# Patient Record
Sex: MALE | Race: WHITE | NOT HISPANIC OR LATINO | Employment: OTHER | URBAN - NONMETROPOLITAN AREA
[De-identification: names, ages, dates, MRNs, and addresses within clinical notes are randomized per-mention and may not be internally consistent; named-entity substitution may affect disease eponyms.]

---

## 2017-05-24 ENCOUNTER — HOSPITAL ENCOUNTER (EMERGENCY)
Facility: HOSPITAL | Age: 42
Discharge: HOME | End: 2017-05-24
Attending: EMERGENCY MEDICINE | Admitting: EMERGENCY MEDICINE

## 2017-05-24 ENCOUNTER — APPOINTMENT (OUTPATIENT)
Dept: CT IMAGING | Facility: HOSPITAL | Age: 42
End: 2017-05-24

## 2017-05-24 VITALS
RESPIRATION RATE: 14 BRPM | DIASTOLIC BLOOD PRESSURE: 75 MMHG | OXYGEN SATURATION: 97 % | BODY MASS INDEX: 37.86 KG/M2 | HEART RATE: 90 BPM | WEIGHT: 295 LBS | TEMPERATURE: 98.1 F | SYSTOLIC BLOOD PRESSURE: 136 MMHG | HEIGHT: 74 IN

## 2017-05-24 DIAGNOSIS — R10.9 FLANK PAIN: Primary | ICD-10-CM

## 2017-05-24 LAB
ALBUMIN SERPL-MCNC: 4.1 G/DL (ref 3.5–5)
ALBUMIN/GLOB SERPL: 1.3 G/DL (ref 1.1–2.5)
ALP SERPL-CCNC: 78 U/L (ref 24–120)
ALT SERPL W P-5'-P-CCNC: 37 U/L (ref 0–54)
AMYLASE SERPL-CCNC: 47 U/L (ref 30–110)
ANION GAP SERPL CALCULATED.3IONS-SCNC: 14 MMOL/L (ref 4–13)
APTT PPP: 26.1 SECONDS (ref 24.1–34.8)
AST SERPL-CCNC: 28 U/L (ref 7–45)
BASOPHILS # BLD AUTO: 0.03 10*3/MM3 (ref 0–0.2)
BASOPHILS NFR BLD AUTO: 0.3 % (ref 0–2)
BILIRUB SERPL-MCNC: 1 MG/DL (ref 0.1–1)
BILIRUB UR QL STRIP: NEGATIVE
BUN BLD-MCNC: 13 MG/DL (ref 5–21)
BUN/CREAT SERPL: 12.5 (ref 7–25)
CALCIUM SPEC-SCNC: 9.7 MG/DL (ref 8.4–10.4)
CHLORIDE SERPL-SCNC: 105 MMOL/L (ref 98–110)
CLARITY UR: CLEAR
CO2 SERPL-SCNC: 23 MMOL/L (ref 24–31)
COLOR UR: YELLOW
CREAT BLD-MCNC: 1.04 MG/DL (ref 0.5–1.4)
DEPRECATED RDW RBC AUTO: 41.3 FL (ref 40–54)
EOSINOPHIL # BLD AUTO: 0.05 10*3/MM3 (ref 0–0.7)
EOSINOPHIL NFR BLD AUTO: 0.6 % (ref 0–4)
ERYTHROCYTE [DISTWIDTH] IN BLOOD BY AUTOMATED COUNT: 13 % (ref 12–15)
GFR SERPL CREATININE-BSD FRML MDRD: 78 ML/MIN/1.73
GLOBULIN UR ELPH-MCNC: 3.2 GM/DL
GLUCOSE BLD-MCNC: 105 MG/DL (ref 70–100)
GLUCOSE UR STRIP-MCNC: NEGATIVE MG/DL
HCT VFR BLD AUTO: 44.3 % (ref 40–52)
HGB BLD-MCNC: 14.9 G/DL (ref 14–18)
HGB UR QL STRIP.AUTO: NEGATIVE
IMM GRANULOCYTES # BLD: 0.02 10*3/MM3 (ref 0–0.03)
IMM GRANULOCYTES NFR BLD: 0.2 % (ref 0–5)
INR PPP: 0.93 (ref 0.91–1.09)
KETONES UR QL STRIP: NEGATIVE
LEUKOCYTE ESTERASE UR QL STRIP.AUTO: NEGATIVE
LIPASE SERPL-CCNC: 23 U/L (ref 23–203)
LYMPHOCYTES # BLD AUTO: 1.68 10*3/MM3 (ref 0.72–4.86)
LYMPHOCYTES NFR BLD AUTO: 19.5 % (ref 15–45)
MCH RBC QN AUTO: 29.2 PG (ref 28–32)
MCHC RBC AUTO-ENTMCNC: 33.6 G/DL (ref 33–36)
MCV RBC AUTO: 86.7 FL (ref 82–95)
MONOCYTES # BLD AUTO: 0.51 10*3/MM3 (ref 0.19–1.3)
MONOCYTES NFR BLD AUTO: 5.9 % (ref 4–12)
NEUTROPHILS # BLD AUTO: 6.32 10*3/MM3 (ref 1.87–8.4)
NEUTROPHILS NFR BLD AUTO: 73.5 % (ref 39–78)
NITRITE UR QL STRIP: NEGATIVE
NT-PROBNP SERPL-MCNC: 34.2 PG/ML (ref 0–450)
PH UR STRIP.AUTO: 8.5 [PH] (ref 5–8)
PLATELET # BLD AUTO: 230 10*3/MM3 (ref 130–400)
PMV BLD AUTO: 10 FL (ref 6–12)
POTASSIUM BLD-SCNC: 4 MMOL/L (ref 3.5–5.3)
PROT SERPL-MCNC: 7.3 G/DL (ref 6.3–8.7)
PROT UR QL STRIP: ABNORMAL
PROTHROMBIN TIME: 12.7 SECONDS (ref 11.9–14.6)
RBC # BLD AUTO: 5.11 10*6/MM3 (ref 4.8–5.9)
SODIUM BLD-SCNC: 142 MMOL/L (ref 135–145)
SP GR UR STRIP: 1.02 (ref 1–1.03)
UROBILINOGEN UR QL STRIP: ABNORMAL
WBC NRBC COR # BLD: 8.61 10*3/MM3 (ref 4.8–10.8)

## 2017-05-24 PROCEDURE — 25010000002 HYDROMORPHONE PER 4 MG: Performed by: EMERGENCY MEDICINE

## 2017-05-24 PROCEDURE — 96361 HYDRATE IV INFUSION ADD-ON: CPT

## 2017-05-24 PROCEDURE — 96374 THER/PROPH/DIAG INJ IV PUSH: CPT

## 2017-05-24 PROCEDURE — 81003 URINALYSIS AUTO W/O SCOPE: CPT | Performed by: EMERGENCY MEDICINE

## 2017-05-24 PROCEDURE — 82150 ASSAY OF AMYLASE: CPT | Performed by: EMERGENCY MEDICINE

## 2017-05-24 PROCEDURE — 25010000002 ONDANSETRON PER 1 MG: Performed by: EMERGENCY MEDICINE

## 2017-05-24 PROCEDURE — 96375 TX/PRO/DX INJ NEW DRUG ADDON: CPT

## 2017-05-24 PROCEDURE — 83880 ASSAY OF NATRIURETIC PEPTIDE: CPT | Performed by: EMERGENCY MEDICINE

## 2017-05-24 PROCEDURE — 74176 CT ABD & PELVIS W/O CONTRAST: CPT

## 2017-05-24 PROCEDURE — 85730 THROMBOPLASTIN TIME PARTIAL: CPT | Performed by: EMERGENCY MEDICINE

## 2017-05-24 PROCEDURE — 85025 COMPLETE CBC W/AUTO DIFF WBC: CPT | Performed by: EMERGENCY MEDICINE

## 2017-05-24 PROCEDURE — 80053 COMPREHEN METABOLIC PANEL: CPT | Performed by: EMERGENCY MEDICINE

## 2017-05-24 PROCEDURE — 99284 EMERGENCY DEPT VISIT MOD MDM: CPT

## 2017-05-24 PROCEDURE — 85610 PROTHROMBIN TIME: CPT | Performed by: EMERGENCY MEDICINE

## 2017-05-24 PROCEDURE — 83690 ASSAY OF LIPASE: CPT | Performed by: EMERGENCY MEDICINE

## 2017-05-24 RX ORDER — HYDROCODONE BITARTRATE AND ACETAMINOPHEN 10; 325 MG/1; MG/1
1 TABLET ORAL EVERY 6 HOURS PRN
Qty: 15 TABLET | Refills: 0 | Status: SHIPPED | OUTPATIENT
Start: 2017-05-24 | End: 2017-12-01 | Stop reason: HOSPADM

## 2017-05-24 RX ORDER — DIAZEPAM 5 MG/1
5 TABLET ORAL EVERY 6 HOURS PRN
Qty: 15 TABLET | Refills: 0 | Status: SHIPPED | OUTPATIENT
Start: 2017-05-24 | End: 2017-10-26 | Stop reason: SDUPTHER

## 2017-05-24 RX ORDER — SODIUM CHLORIDE 9 MG/ML
125 INJECTION, SOLUTION INTRAVENOUS CONTINUOUS
Status: DISCONTINUED | OUTPATIENT
Start: 2017-05-24 | End: 2017-05-24 | Stop reason: HOSPADM

## 2017-05-24 RX ORDER — ONDANSETRON 2 MG/ML
4 INJECTION INTRAMUSCULAR; INTRAVENOUS ONCE
Status: COMPLETED | OUTPATIENT
Start: 2017-05-24 | End: 2017-05-24

## 2017-05-24 RX ADMIN — HYDROMORPHONE HYDROCHLORIDE 1 MG: 1 INJECTION, SOLUTION INTRAMUSCULAR; INTRAVENOUS; SUBCUTANEOUS at 11:00

## 2017-05-24 RX ADMIN — ONDANSETRON 4 MG: 2 INJECTION INTRAMUSCULAR; INTRAVENOUS at 11:01

## 2017-05-24 RX ADMIN — SODIUM CHLORIDE 125 ML/HR: 9 INJECTION, SOLUTION INTRAVENOUS at 11:10

## 2017-05-30 ENCOUNTER — TRANSCRIBE ORDERS (OUTPATIENT)
Dept: ADMINISTRATIVE | Facility: HOSPITAL | Age: 42
End: 2017-05-30

## 2017-05-30 DIAGNOSIS — K77 LIVER DISORDERS IN DISEASES CLASSIFIED ELSEWHERE: Primary | ICD-10-CM

## 2017-06-02 ENCOUNTER — HOSPITAL ENCOUNTER (OUTPATIENT)
Dept: ULTRASOUND IMAGING | Facility: HOSPITAL | Age: 42
Discharge: HOME | End: 2017-06-02
Admitting: NURSE PRACTITIONER

## 2017-06-02 DIAGNOSIS — K77 LIVER DISORDERS IN DISEASES CLASSIFIED ELSEWHERE: ICD-10-CM

## 2017-06-02 PROCEDURE — 76705 ECHO EXAM OF ABDOMEN: CPT

## 2017-07-18 ENCOUNTER — OFFICE VISIT (OUTPATIENT)
Dept: NEUROSURGERY | Facility: CLINIC | Age: 42
End: 2017-07-18

## 2017-07-18 VITALS
DIASTOLIC BLOOD PRESSURE: 90 MMHG | SYSTOLIC BLOOD PRESSURE: 120 MMHG | BODY MASS INDEX: 39.78 KG/M2 | HEIGHT: 74 IN | WEIGHT: 310 LBS

## 2017-07-18 DIAGNOSIS — Z78.9 NON-SMOKER: ICD-10-CM

## 2017-07-18 DIAGNOSIS — M51.26 DISPLACEMENT OF LUMBAR INTERVERTEBRAL DISC WITHOUT MYELOPATHY: Primary | ICD-10-CM

## 2017-07-18 PROCEDURE — 99204 OFFICE O/P NEW MOD 45 MIN: CPT | Performed by: NURSE PRACTITIONER

## 2017-07-18 RX ORDER — TIZANIDINE 4 MG/1
4 TABLET ORAL EVERY 8 HOURS PRN
Qty: 90 TABLET | Refills: 2 | Status: SHIPPED | OUTPATIENT
Start: 2017-07-18 | End: 2017-10-26 | Stop reason: SDUPTHER

## 2017-07-18 RX ORDER — DICLOFENAC SODIUM 75 MG/1
75 TABLET, DELAYED RELEASE ORAL 2 TIMES DAILY
Qty: 60 TABLET | Refills: 2 | Status: SHIPPED | OUTPATIENT
Start: 2017-07-18 | End: 2017-10-05 | Stop reason: HOSPADM

## 2017-07-18 RX ORDER — METHYLPREDNISOLONE 4 MG/1
TABLET ORAL
Qty: 21 EACH | Refills: 0 | Status: SHIPPED | OUTPATIENT
Start: 2017-07-18 | End: 2017-10-26

## 2017-07-18 NOTE — PROGRESS NOTES
Chief complaint:   Chief Complaint   Patient presents with   • Back Pain     Patient is referred here today for his back pain, also has pain that goes down into his left leg. He states he has not had any physical therapy.        Subjective     HPI: This is a 42-year-old male gentleman who is referred to us for back and left lower extremity pain.  He is here to be evaluated today.  He states that the pain began about 6 months ago but it really became severe about 2 months ago.  He says that the pain in his back is constant.  He says its better when is lying down.  Its worse when he is bending he says that he noticed the pain whenever he was wheeling his mother up a ramp.  Pain in his back is about 80% of his problem in his lower extremity about 20%.  He says that he is having pain going down his leg in a posterior radicular fashion all the way to his foot.  This pain is intermittent.  He says its better when is lying down.  Its worse when he sitting.  Is not complaining of any bowel or bladder issues.  He is not any recent physical therapy, chiropractic care, or pain management injections.  He rates the pain on a scale 0-10 at a 9.  He is right-hand dominant.  He works as a paramedic and .    Review of Systems   Constitutional: Positive for unexpected weight change.   HENT: Positive for hearing loss.    Eyes:        Blurred/double vision   Gastrointestinal:        Change in bowel movements   Endocrine: Positive for cold intolerance and heat intolerance.        Glandular / hormone problem   Musculoskeletal: Positive for back pain and joint swelling.   All other systems reviewed and are negative.       Past Medical History:   Diagnosis Date   • Arthritis    • Depression    • Disease of thyroid gland    • Hypertension    • Injury of back      Past Surgical History:   Procedure Laterality Date   • TONSILLECTOMY     • VASECTOMY       Family History   Problem Relation Age of Onset   • Arthritis Mother    •  "Hypertension Mother    • Aneurysm Father    • No Known Problems Sister    • No Known Problems Brother      Social History   Substance Use Topics   • Smoking status: Never Smoker   • Smokeless tobacco: Current User     Types: Snuff   • Alcohol use Yes      Comment: occassional       (Not in a hospital admission)  Allergies:  Eggs or egg-derived products    Objective      Vital Signs  /90 (BP Location: Right arm, Patient Position: Sitting)  Ht 74\" (188 cm)  Wt (!) 310 lb (141 kg)  BMI 39.8 kg/m2    Physical Exam   Constitutional: He is oriented to person, place, and time. He appears well-developed and well-nourished.   HENT:   Head: Normocephalic.   Eyes: Conjunctivae, EOM and lids are normal. Pupils are equal, round, and reactive to light.   Neck: Normal range of motion.   Cardiovascular: Normal rate, regular rhythm and normal heart sounds.    Pulmonary/Chest: Effort normal and breath sounds normal.   Abdominal: Normal appearance.   Musculoskeletal: Normal range of motion.        Lumbar back: He exhibits pain.   Neurological: He is alert and oriented to person, place, and time. He has normal strength and normal reflexes. He displays normal reflexes. No cranial nerve deficit or sensory deficit. GCS eye subscore is 4. GCS verbal subscore is 5. GCS motor subscore is 6.   Skin: Skin is warm.   Psychiatric: He has a normal mood and affect. His speech is normal and behavior is normal. Thought content normal. Cognition and memory are normal.       Results Review: The patient had a CT scan of his abdomen and pelvis which did reveal that he did have a disc herniation at L4-5.  It does appear to be off to the left.  No malalignment was visualized.  No fracture visualized.          Assessment/Plan: At this point we are in set the patient into a dedicated course of physical therapy consisting of 2-3 times a week for 4-6 weeks.  I will follow-up again with the patient in 6 weeks.  I am is start him on anti-inflammatory " medication, Medrol Dosepak, and Zanaflex.  Should the patient not have any improvement from the therapy we will order an MRI of his lumbar spine.  BMI shows that is very overweight.  BMI chart was given the patient.  He is a nonsmoker.      Alin was seen today for back pain.    Diagnoses and all orders for this visit:    Displacement of lumbar intervertebral disc without myelopathy    BMI 39.0-39.9,adult    Non-smoker        I discussed the patients findings and my recommendations with patient    Huber Hyde, WIL  07/18/17  3:28 PM

## 2017-07-19 ENCOUNTER — TRANSCRIBE ORDERS (OUTPATIENT)
Dept: PHYSICAL THERAPY | Facility: HOSPITAL | Age: 42
End: 2017-07-19

## 2017-07-19 DIAGNOSIS — M51.26 DISPLACEMENT OF LUMBAR INTERVERTEBRAL DISC WITHOUT MYELOPATHY: Primary | ICD-10-CM

## 2017-07-25 ENCOUNTER — HOSPITAL ENCOUNTER (OUTPATIENT)
Dept: PHYSICAL THERAPY | Facility: HOSPITAL | Age: 42
Setting detail: THERAPIES SERIES
Discharge: HOME | End: 2017-07-25

## 2017-07-25 DIAGNOSIS — M51.26 DISPLACEMENT OF LUMBAR INTERVERTEBRAL DISC WITHOUT MYELOPATHY: Primary | ICD-10-CM

## 2017-07-25 PROCEDURE — 97161 PT EVAL LOW COMPLEX 20 MIN: CPT | Performed by: PHYSICAL THERAPIST

## 2017-07-25 PROCEDURE — 97110 THERAPEUTIC EXERCISES: CPT | Performed by: PHYSICAL THERAPIST

## 2017-07-25 PROCEDURE — 97012 MECHANICAL TRACTION THERAPY: CPT | Performed by: PHYSICAL THERAPIST

## 2017-07-25 NOTE — PROGRESS NOTES
Outpatient Physical Therapy Ortho Initial Evaluation  [facility]     Patient Name: Alin Richard  : 1975  MRN: [id number]  Today's Date: 2017    Attendance  available. MD in 2 weeks.    Visit Date: 2017    Patient Active Problem List   Diagnosis   • Displacement of lumbar intervertebral disc without myelopathy   • BMI 39.0-39.9,adult   • Non-smoker        Past Medical History:   Diagnosis Date   • Arthritis    • Depression    • Disease of thyroid gland    • Hypertension    • Injury of back         Past Surgical History:   Procedure Laterality Date   • TONSILLECTOMY     • VASECTOMY       Outpatient Medications           diclofenac (VOLTAREN) 75 MG EC tablet      Escitalopram Oxalate (LEXAPRO PO)      Hydrocodone-Acetaminophen (NORCO PO)      HYDROcodone-acetaminophen (NORCO)  MG per tablet      Levothyroxine Sodium (SYNTHROID PO)      LISINOPRIL PO            TESTOSTERONE CYPIONATE IM      tiZANidine (ZANAFLEX) 4 MG tablet      Topiramate (TOPAMAX PO)      Allergies: eggs/ egg products    Visit Dx:     ICD-10-CM ICD-9-CM   1. Displacement of lumbar intervertebral disc without myelopathy M51.26 722.10             Patient History       17 1500          History    Brief Description of Current Complaint Norco long term, Pain comes and goes in intensity. [facility]. ED because of back spasms he thought was a kidney stone.  -DD      What clinical tests have you had for this problem? CT scan;X-ray  -DD      Results of Clinical Tests disc bulge  -DD      Pain     Pain Comments travels down left hip wraps around to the front at the knee to the top and lateral foot  -DD        User Key  (r) = Recorded By, (t) = Taken By, (c) = Cosigned By    Initials Name Provider Type    DD Thea Olivo, PT Physical Therapist                PT Ortho       17 1500    Subjective Comments    Subjective Comments Chronic issue at least a year.  -DD    Subjective Pain     "Pre-Treatment Pain Level 3  -DD    Post-Treatment Pain Level 3  -DD    Posture/Observations    Posture/Observations Comments slightly rounded shoulders, Obese, poor abdominal tone  -DD    Special Tests/Palpation    Special Tests/Palpation --   Neg SLR, Neg Fabers  -DD    ROM (Range of Motion)    General ROM Detail Flexion 2\" from floor, ext 20 deg, Lateral flexion WFL with pain, Hamstrings 55 deg.  -DD    MMT (Manual Muscle Testing)    General MMT Assessment Detail 5/5  -DD      User Key  (r) = Recorded By, (t) = Taken By, (c) = Cosigned By    Initials Name Provider Type    JOSE Olivo, PT Physical Therapist                            Therapy Education       07/25/17 1603          Therapy Education    Given HEP  -DD      Program New  -DD      How Provided Verbal;Demonstration;Written  -DD      Provided to Patient  -DD      Level of Understanding Teach back education performed;Verbalized;Demonstrated  -DD        User Key  (r) = Recorded By, (t) = Taken By, (c) = Cosigned By    Initials Name Provider Type    JOSE Olivo, ABIDA Physical Therapist                PT OP Goals       07/25/17 1500       PT Short Term Goals    STG Date to Achieve 08/15/17  -DD     STG 1 Patient will be independent in home exercise program  -DD     STG 2 Patient will have modified Oswestry score of 30% or less  -DD     STG 3 Lumbar flexion range of motion to the floor with no discomfort  -DD     STG 4 Lumbar extension range of motion 25 deg without pain  -DD     STG 5 Patient will report pain as 2/10  -DD     Long Term Goals    LTG Date to Achieve 08/29/17  -DD     LTG 1 Patient will have modified Oswestry score of 26% or better  -DD     LTG 2 Patient to report decreased radicular symptoms  -DD     LTG 3 Subjective improvement 50% or more.  -DD     LTG 4 Patient to nakul 45 min ther ex without increase symptoms  -DD     LTG 5 Hamstring flexibiity 70 at 90/90 position.  -DD     Time Calculation    PT Goal Re-Cert Due Date " 08/15/17  -DD       User Key  (r) = Recorded By, (t) = Taken By, (c) = Cosigned By    Initials Name Provider Type    JOSE Olivo, PT Physical Therapist                PT Assessment/Plan       07/25/17 1629       PT Assessment    Functional Limitations Impaired gait;Performance in leisure activities;Performance in self-care ADL;Performance in work activities;Limitation in home management;Limitations in community activities  -DD     Impairments Gait;Endurance;Pain;Poor body mechanics;Range of motion;Impaired flexibility  -DD     Assessment Comments Patient has lumbar mechanical and degenerative changes impairing flexibility and referred left leg pain. Would benefit from core stabiliztion and flexibiity exercises, Manual as needed. Traction, Ice. Electrical stimulation modalities as needed.  -DD     Please refer to paper survey for additional self-reported information Yes  -DD     Rehab Potential Good  -DD     Patient/caregiver participated in establishment of treatment plan and goals Yes  -DD     PT Plan    PT Frequency 2x/week  -DD     Predicted Duration of Therapy Intervention (days/wks) 4 weeks  -DD     Planned CPT's? PT EVAL LOW COMPLEXITY: 22761;PT RE-EVAL: 58793;PT GAIT TRAINING EA 15 MIN: 13624;PT MANUAL THERAPY EA 15 MIN: 34620;PT NEUROMUSC RE-EDUCATION EA 15 MIN: 25701;PT THER PROC EA 15 MIN: 34554;PT ELECTRICAL STIM UNATTEND: ;PT HOT/COLD PACK WC NONMCARE: 71040  -DD     Physical Therapy Interventions (Optional Details) dry needling;gait training;home exercise program;joint mobilization;postural re-education;stretching;strengthening;ROM (Range of Motion);stair training;manual therapy techniques;modalities  -DD     PT Plan Comments Lumbar traction, core stabilization and flexibility. Manual therapy as needed may include dry needling. Ice and e-stim PRN.  -DD       User Key  (r) = Recorded By, (t) = Taken By, (c) = Cosigned By    Initials Name Provider Type    JOSE Olivo, PT  Physical Therapist                Modalities       07/25/17 1500          Traction 08687    Traction Type Lumbar  -DD      Rx Minutes 15  -DD      Duration Intermittent  -DD      Position Hook-lying  -DD      Weight 90  -DD      Hold 60  -DD      Relax 10  -DD      Progression 1  -DD      Regression 1  -DD        User Key  (r) = Recorded By, (t) = Taken By, (c) = Cosigned By    Initials Name Provider Type    DD Thea Olivo, ABIDA Physical Therapist              Exercises       07/25/17 1500          Subjective Comments    Subjective Comments Chronic issue at least a year.  -DD      Subjective Pain    Pre-Treatment Pain Level 3  -DD      Post-Treatment Pain Level 3  -DD      Exercise 1    Exercise Name 1 WESTON  -DD      Reps 1 10  -DD      Time (Seconds) 1 10  -DD      Exercise 2    Exercise Name 2 Prone TKEs  -DD      Reps 2 10  -DD      Time (Seconds) 2 5  -DD      Exercise 3    Exercise Name 3 Hamstring stretch  -DD      Reps 3 3  -DD      Time (Seconds) 3 30  -DD      Exercise 4    Exercise Name 4 Pirfiromis stretch  -DD      Reps 4 3  -DD      Time (Seconds) 4 30  -DD      Exercise 5    Exercise Name 5 TA with add  -DD      Reps 5 10  -DD      Exercise 6    Exercise Name 6 Seated neural tensi glides.  -DD      Reps 6 2  -DD      Time (Seconds) 6 30  -DD        User Key  (r) = Recorded By, (t) = Taken By, (c) = Cosigned By    Initials Name Provider Type    JOSE Olivo, ABIDA Physical Therapist                              Outcome Measures       07/25/17 1600          Modified Oswestry    Modified Oswestry Score/Comments 36%  -DD      Functional Assessment    Outcome Measure Options Modifed Owestry  -DD        User Key  (r) = Recorded By, (t) = Taken By, (c) = Cosigned By    Initials Name Provider Type    JOSE Olivo PT Physical Therapist            Time Calculation:   Start Time: 1528  Stop Time: 1640  Time Calculation (min): 72 min  Total Timed Code Minutes- PT: 25 minute(s)      Therapy Charges for Today     Code Description Service Date Service Provider Modifiers Qty    86389848951 HC PT-TRACTION MECHANICAL 7/25/2017 Thea Olivo, PT  1    26677410166 HC PT EVAL LOW COMPLEXITY 2 7/25/2017 Thea Olivo, PT GP 1    35221759645 HC PT THER PROC EA 15 MIN 7/25/2017 Thea Olivo, PT GP 2          PT G-Codes  Outcome Measure Options: Modifed Owestry 36%        Thea Olivo, PT, ATC, DPT  7/25/2017

## 2017-08-03 ENCOUNTER — HOSPITAL ENCOUNTER (OUTPATIENT)
Dept: PHYSICAL THERAPY | Facility: HOSPITAL | Age: 42
Setting detail: THERAPIES SERIES
Discharge: HOME | End: 2017-08-03

## 2017-08-03 DIAGNOSIS — M51.26 DISPLACEMENT OF LUMBAR INTERVERTEBRAL DISC WITHOUT MYELOPATHY: Primary | ICD-10-CM

## 2017-08-03 PROCEDURE — 97110 THERAPEUTIC EXERCISES: CPT

## 2017-08-03 PROCEDURE — 97140 MANUAL THERAPY 1/> REGIONS: CPT

## 2017-08-03 NOTE — THERAPY TREATMENT NOTE
"    Outpatient Physical Therapy Ortho Treatment Note  [facility]  Kathy Tian PTA  17  4:54 PM       Patient Name: Alin Richard  : 1975  MRN: [id number]  Today's Date: 8/3/2017      Visit Date: 2017  Subjective Improvement: 0%      Attendance:    Approved:18 visits           MD follow up: 17            date: 8/15/17          Visit Dx:    ICD-10-CM ICD-9-CM   1. Displacement of lumbar intervertebral disc without myelopathy M51.26 722.10       Patient Active Problem List   Diagnosis   • Displacement of lumbar intervertebral disc without myelopathy   • BMI 39.0-39.9,adult   • Non-smoker        Past Medical History:   Diagnosis Date   • Arthritis    • Depression    • Disease of thyroid gland    • Hypertension    • Injury of back         Past Surgical History:   Procedure Laterality Date   • TONSILLECTOMY     • VASECTOMY               PT Ortho       17 1500    Subjective Pain    Post-Treatment Pain Level 2  -      User Key  (r) = Recorded By, (t) = Taken By, (c) = Cosigned By    Initials Name Provider Type     Kathy Tian PTA Physical Therapy Assistant                            PT Assessment/Plan       17 1500       PT Assessment    Functional Limitations Impaired gait;Performance in leisure activities;Performance in self-care ADL;Performance in work activities;Limitation in home management;Limitations in community activities  -     Impairments Gait;Endurance;Pain;Poor body mechanics;Range of motion;Impaired flexibility  -     Assessment Comments pt did very well with all therex given. pt did report \"tightness\" with WESTON. Decerease pain post Lumbar mechanical traction  -     Rehab Potential Good  -     Patient/caregiver participated in establishment of treatment plan and goals Yes  -     Patient would benefit from skilled therapy intervention Yes  -BRYON     PT Plan    PT Frequency 2x/week  -     Predicted Duration of Therapy " "Intervention (days/wks) 4 weeks  -BRYON     PT Plan Comments Bridges  -BRYON       User Key  (r) = Recorded By, (t) = Taken By, (c) = Cosigned By    Initials Name Provider Type    BRYON Tian PTA Physical Therapy Assistant                Modalities       08/03/17 1500          Traction 25228    Traction Type Lumbar  -BRYON      Rx Minutes 15  -BRYON      Duration Intermittent  -BRYON      Position Hook-lying  -BRYON      Weight 100  -BRYON      Hold 60  -BRYON      Relax 10  -BRYON        User Key  (r) = Recorded By, (t) = Taken By, (c) = Cosigned By    Initials Name Provider Type    BRYON Tian PTA Physical Therapy Assistant                Exercises       08/03/17 1500          Subjective Comments    Subjective Comments pt states that his pain in his back has lessened since last treatment but his HS feel extremely tight- \"tight to where it is affecting my gait\"  -BRYON      Subjective Pain    Able to rate subjective pain? yes  -BRYON      Pre-Treatment Pain Level 3  -BRYON      Post-Treatment Pain Level 2  -BRYON      Aquatics    Aquatics performed? No  -BRYON      Exercise 1    Exercise Name 1 PRO II for ROM   L3.0  -BRYON      Time (Minutes) 1 8 min  -BRYON      Exercise 2    Exercise Name 2 HS stretch  -BRYON      Reps 2 3  -BRYON      Time (Seconds) 2 30 sec hold  -BRYON      Exercise 3    Exercise Name 3 Supine piriformis stretch  -BRYON      Reps 3 3  -BRYON      Time (Seconds) 3 30 sec hold  -BRYON      Exercise 4    Exercise Name 4 ISO TA w/ add  -BRYON      Reps 4 10  -BRYON      Time (Seconds) 4 5 sec hold  -BRYON      Exercise 5    Exercise Name 5 Prone TKE  -BRYON      Sets 5 2  -BRYON      Reps 5 10  -BRYON      Time (Seconds) 5 5 sec hold  -BRYON      Exercise 6    Exercise Name 6 WESTON   -BRYON      Reps 6 10  -BRYON      Time (Seconds) 6 10 sec hold  -BRYON      Exercise 7    Exercise Name 7 Seated neural tension glides  -BRYON      Reps 7 10  -BRYON      Additional Comments 10 ankle pumps  -BRYON        User Key  (r) = Recorded By, (t) = Taken By, (c) = Cosigned By    Initials Name " Provider Type    BRYON Tian PTA Physical Therapy Assistant                        Manual Rx (last 36 hours)      Manual Treatments       08/03/17 1500          Manual Rx 1    Manual Rx 1 Location R and L leg  -      Manual Rx 1 Type Manual HS stretch  -      Manual Rx 1 Duration 3 min  -        User Key  (r) = Recorded By, (t) = Taken By, (c) = Cosigned By    Initials Name Provider Type    BRYON Tian PTA Physical Therapy Assistant                PT OP Goals       08/03/17 1500       PT Short Term Goals    STG Date to Achieve 08/15/17  -     STG 1 Patient will be independent in home exercise program  -     STG 1 Progress Ongoing  -     STG 2 Patient will have modified Oswestry score of 30% or less  -     STG 2 Progress Ongoing  -     STG 3 Lumbar flexion range of motion to the floor with no discomfort  -     STG 3 Progress Ongoing  -     STG 4 Lumbar extension range of motion 25 deg without pain  -     STG 4 Progress Ongoing  -BRYON     STG 5 Patient will report pain as 2/10  -     STG 5 Progress Ongoing  -     Long Term Goals    LTG Date to Achieve 08/29/17  -     LTG 1 Patient will have modified Oswestry score of 26% or better  -     LTG 1 Progress Ongoing  -     LTG 2 Patient to report decreased radicular symptoms  -     LTG 2 Progress Ongoing  -     LTG 3 Subjective improvement 50% or more.  -     LTG 3 Progress Ongoing  -     LTG 4 Patient to nakul 45 min ther ex without increase symptoms  -     LTG 4 Progress Ongoing  -     LTG 5 Hamstring flexibiity 70 at 90/90 position.  -     LTG 5 Progress Ongoing  -     Time Calculation    PT Goal Re-Cert Due Date 08/15/17   Visits: 2/2 MD: 9/12/17 Improvement: 0%  -       User Key  (r) = Recorded By, (t) = Taken By, (c) = Cosigned By    Initials Name Provider Type    BRYON Tian PTA Physical Therapy Assistant                Therapy Education       08/03/17 1500          Therapy Education     Given HEP;Symptoms/condition management;Pain management  -BRYON      Program New  -BRYON      How Provided Verbal;Demonstration;Written  -BRYON      Provided to Patient  -BRYON      Level of Understanding Teach back education performed;Verbalized;Demonstrated  -BRYON        User Key  (r) = Recorded By, (t) = Taken By, (c) = Cosigned By    Initials Name Provider Type    BRYON Tian PTA Physical Therapy Assistant                Time Calculation:   Start Time: 1515  Stop Time: 1620  Time Calculation (min): 65 min  Total Timed Code Minutes- PT: 50 minute(s)    Therapy Charges for Today     Code Description Service Date Service Provider Modifiers Qty    22066723151 HC PT THER PROC EA 15 MIN 8/3/2017 Kathy Tian PTA GP 3    57146862629 HC PT-TRACTION MANUAL-PER 15 MIN 8/3/2017 Kathy Tian PTA  1                    Kathy Tian PTA  8/3/2017

## 2017-08-08 ENCOUNTER — HOSPITAL ENCOUNTER (OUTPATIENT)
Dept: PHYSICAL THERAPY | Facility: HOSPITAL | Age: 42
Setting detail: THERAPIES SERIES
Discharge: HOME | End: 2017-08-08

## 2017-08-08 DIAGNOSIS — M51.26 DISPLACEMENT OF LUMBAR INTERVERTEBRAL DISC WITHOUT MYELOPATHY: Primary | ICD-10-CM

## 2017-08-08 PROCEDURE — 97012 MECHANICAL TRACTION THERAPY: CPT

## 2017-08-08 PROCEDURE — 97110 THERAPEUTIC EXERCISES: CPT

## 2017-08-08 NOTE — THERAPY TREATMENT NOTE
Outpatient Physical Therapy Ortho Treatment Note  [facility]  More Fink PTA  17  5:45 PM       Patient Name: Alin Richard  : 1975  MRN: [id number]  Today's Date: 2017      Visit Date: 2017  Subjective Improvement:       Attendance: 3/3  Approved:        18 visits   MD follow up:         17   date:     8/15/17    Visit Dx:    ICD-10-CM ICD-9-CM   1. Displacement of lumbar intervertebral disc without myelopathy M51.26 722.10       Patient Active Problem List   Diagnosis   • Displacement of lumbar intervertebral disc without myelopathy   • BMI 39.0-39.9,adult   • Non-smoker        Past Medical History:   Diagnosis Date   • Arthritis    • Depression    • Disease of thyroid gland    • Hypertension    • Injury of back         Past Surgical History:   Procedure Laterality Date   • TONSILLECTOMY     • VASECTOMY               PT Ortho       17 1500    Subjective Comments    Subjective Comments Pt states his back/leg better but not where he needs it to be; notes less symptoms in L LE but still left low back.  -PM    Subjective Pain    Able to rate subjective pain? yes  -PM    Pre-Treatment Pain Level 4  -PM    Post-Treatment Pain Level 3  -PM      User Key  (r) = Recorded By, (t) = Taken By, (c) = Cosigned By    Initials Name Provider Type    PM More Fink PTA Physical Therapy Assistant                            PT Assessment/Plan       17 1500       PT Assessment    Functional Limitations Impaired gait;Performance in leisure activities;Performance in self-care ADL;Performance in work activities;Limitation in home management;Limitations in community activities  -PM     Impairments Gait;Endurance;Pain;Poor body mechanics;Range of motion;Impaired flexibility  -PM     Assessment Comments Good effort and participation; some cues for iso TA, improved after review; does very well with lumbar traction, minimal pull/co's  -PM     Rehab Potential Good   -PM     Patient/caregiver participated in establishment of treatment plan and goals Yes  -PM     Patient would benefit from skilled therapy intervention Yes  -PM     PT Plan    PT Frequency 2x/week  -PM     Predicted Duration of Therapy Intervention (days/wks) 4  -PM     PT Plan Comments prone hip ext; seated PN TA with LAQ; reeval next wk  -PM       User Key  (r) = Recorded By, (t) = Taken By, (c) = Cosigned By    Initials Name Provider Type    PM More Fink PTA Physical Therapy Assistant                Modalities       08/08/17 1500          Ice    Patient denies application of Ice Yes  -PM      Traction 35709    Traction Type Lumbar  -PM      Rx Minutes 15  -PM      Duration Intermittent  -PM      Position Hook-lying  -PM      Weight 115  -PM      Hold 60  -PM      Relax 10  -PM        User Key  (r) = Recorded By, (t) = Taken By, (c) = Cosigned By    Initials Name Provider Type    PM More Fink PTA Physical Therapy Assistant                Exercises       08/08/17 1500          Subjective Comments    Subjective Comments Pt states his back/leg better but not where he needs it to be; notes less symptoms in L LE but still left low back.  -PM      Subjective Pain    Able to rate subjective pain? yes  -PM      Pre-Treatment Pain Level 4  -PM      Post-Treatment Pain Level 3  -PM      Aquatics    Aquatics performed? No  -PM      Exercise 1    Exercise Name 1 PRO II for ROM   L3.0  -PM      Time (Minutes) 1 8 min  -PM      Exercise 2    Exercise Name 2 HS stretch  -PM      Reps 2 3  -PM      Time (Seconds) 2 30 sec hold  -PM      Exercise 3    Exercise Name 3 Supine piriformis stretch  -PM      Reps 3 3  -PM      Time (Seconds) 3 30 sec hold  -PM      Exercise 4    Exercise Name 4 ISO TA w/ add/bridge  -PM      Sets 4 2  -PM      Reps 4 10  -PM      Time (Seconds) 4 5 sec hold  -PM      Exercise 5    Exercise Name 5 Prone TKE  -PM      Sets 5 2  -PM      Reps 5 10  -PM      Time (Seconds) 5 5 sec hold   -PM      Exercise 6    Exercise Name 6 WESTON   -PM      Reps 6 10  -PM      Time (Seconds) 6 10 sec hold  -PM      Exercise 7    Exercise Name 7 Seated neural tension glides  -PM      Reps 7 10  -PM      Exercise 8    Exercise Name 8 seated PN w/TA march  -PM      Cueing 8 Verbal  -PM      Sets 8 2  -PM      Reps 8 10  -PM        User Key  (r) = Recorded By, (t) = Taken By, (c) = Cosigned By    Initials Name Provider Type    PM More Fink PTA Physical Therapy Assistant                               PT OP Goals       08/08/17 1500       PT Short Term Goals    STG Date to Achieve 08/15/17  -PM     STG 1 Patient will be independent in home exercise program  -PM     STG 1 Progress Progressing  -PM     STG 2 Patient will have modified Oswestry score of 30% or less  -PM     STG 2 Progress Ongoing  -PM     STG 3 Lumbar flexion range of motion to the floor with no discomfort  -PM     STG 3 Progress Ongoing  -PM     STG 4 Lumbar extension range of motion 25 deg without pain  -PM     STG 4 Progress Ongoing  -PM     STG 5 Patient will report pain as 2/10  -PM     STG 5 Progress Progressing  -PM     Long Term Goals    LTG Date to Achieve 08/29/17  -PM     LTG 1 Patient will have modified Oswestry score of 26% or better  -PM     LTG 1 Progress Ongoing  -PM     LTG 2 Patient to report decreased radicular symptoms  -PM     LTG 2 Progress Progressing  -PM     LTG 3 Subjective improvement 50% or more.  -PM     LTG 3 Progress Progressing  -PM     LTG 4 Patient to nakul 45 min ther ex without increase symptoms  -PM     LTG 4 Progress Ongoing  -PM     LTG 5 Hamstring flexibiity 70 at 90/90 position.  -PM     LTG 5 Progress Ongoing  -PM     Time Calculation    PT Goal Re-Cert Due Date 08/15/17   3/3; MD 9/12; appr 40% impr  -PM       User Key  (r) = Recorded By, (t) = Taken By, (c) = Cosigned By    Initials Name Provider Type    PM More Fink PTA Physical Therapy Assistant                Therapy Education       08/08/17  1500          Therapy Education    Given HEP;Symptoms/condition management;Pain management  -PM      Program Progressed   bridge with iso TA  -PM      How Provided Verbal;Demonstration;Written  -PM      Provided to Patient  -PM      Level of Understanding Teach back education performed;Verbalized;Demonstrated  -PM        User Key  (r) = Recorded By, (t) = Taken By, (c) = Cosigned By    Initials Name Provider Type    PM More Fink PTA Physical Therapy Assistant                Time Calculation:   Start Time: 1520  Stop Time: 1620  Time Calculation (min): 60 min  Total Timed Code Minutes- PT: 45 minute(s)    Therapy Charges for Today     Code Description Service Date Service Provider Modifiers Qty    07835870937 HC PT THER PROC EA 15 MIN 8/8/2017 More Fink PTA GP 3    53984351721 HC PT TRACTION LUMBAR 8/8/2017 More Fink PTA GP 1                    More Fink PTA  8/8/2017

## 2017-08-10 ENCOUNTER — HOSPITAL ENCOUNTER (OUTPATIENT)
Dept: PHYSICAL THERAPY | Facility: HOSPITAL | Age: 42
Setting detail: THERAPIES SERIES
Discharge: HOME | End: 2017-08-10

## 2017-08-10 DIAGNOSIS — M51.26 DISPLACEMENT OF LUMBAR INTERVERTEBRAL DISC WITHOUT MYELOPATHY: Primary | ICD-10-CM

## 2017-08-10 PROCEDURE — 97012 MECHANICAL TRACTION THERAPY: CPT

## 2017-08-10 PROCEDURE — 97110 THERAPEUTIC EXERCISES: CPT

## 2017-08-10 NOTE — THERAPY TREATMENT NOTE
Outpatient Physical Therapy Ortho Treatment Note  [facility]  More Fink PTA  08/10/17  6:17 PM       Patient Name: Alin Richard  : 1975  MRN: [id number]  Today's Date: 8/10/2017      Visit Date: 08/10/2017  Subjective Improvement:  40%     Attendance:   Approved:        18 visits   MD follow up:         17   date:     8/15/17    Visit Dx:    ICD-10-CM ICD-9-CM   1. Displacement of lumbar intervertebral disc without myelopathy M51.26 722.10       Patient Active Problem List   Diagnosis   • Displacement of lumbar intervertebral disc without myelopathy   • BMI 39.0-39.9,adult   • Non-smoker        Past Medical History:   Diagnosis Date   • Arthritis    • Depression    • Disease of thyroid gland    • Hypertension    • Injury of back         Past Surgical History:   Procedure Laterality Date   • TONSILLECTOMY     • VASECTOMY               PT Ortho       08/10/17 1500    Subjective Pain    Post-Treatment Pain Level 4   only in back not in leg  -PM      17 1500    Subjective Comments    Subjective Comments Pt states his back/leg better but not where he needs it to be; notes less symptoms in L LE but still left low back.  -PM    Subjective Pain    Able to rate subjective pain? yes  -PM    Pre-Treatment Pain Level 4  -PM    Post-Treatment Pain Level 3  -PM      User Key  (r) = Recorded By, (t) = Taken By, (c) = Cosigned By    Initials Name Provider Type    PM More Fink PTA Physical Therapy Assistant                            PT Assessment/Plan       08/10/17 1500       PT Assessment    Functional Limitations Impaired gait;Performance in leisure activities;Performance in self-care ADL;Performance in work activities;Limitation in home management;Limitations in community activities  -PM     Impairments Gait;Endurance;Pain;Poor body mechanics;Range of motion;Impaired flexibility  -PM     Assessment Comments Asymmetry today; tension at L piriformis; good response  to MET. Took traction back down d/t incr pain/radicular?  -PM     Rehab Potential Good  -PM     Patient/caregiver participated in establishment of treatment plan and goals Yes  -PM     Patient would benefit from skilled therapy intervention Yes  -PM     PT Plan    PT Frequency 2x/week  -PM     Predicted Duration of Therapy Intervention (days/wks) 4  -PM     PT Plan Comments reeval; monitor post Rx c/o's; standing hip ext with TA/posture  -PM       User Key  (r) = Recorded By, (t) = Taken By, (c) = Cosigned By    Initials Name Provider Type    PM More Fink PTA Physical Therapy Assistant                Modalities       08/10/17 1700 08/10/17 1500       Ice    Patient denies application of Ice Yes  -PM Yes  -PM     Traction 18819    Traction Type  Lumbar  -PM     Rx Minutes  15  -PM     Position  Hook-lying  -PM     Weight  100  -PM     Hold  60  -PM     Relax  10  -PM       User Key  (r) = Recorded By, (t) = Taken By, (c) = Cosigned By    Initials Name Provider Type    PM More Fink PTA Physical Therapy Assistant                Exercises       08/10/17 1500          Subjective Comments    Subjective Comments Pt states his back was hurting some more Tues and then yesterday started going into leg some more, not as bad as it had been initially but more.  -PM      Subjective Pain    Able to rate subjective pain? yes  -PM      Pre-Treatment Pain Level 7  -PM      Post-Treatment Pain Level 4   only in back not in leg  -PM      Aquatics    Aquatics performed? No  -PM      Exercise 1    Exercise Name 1 WESTON  -PM      Cueing 1 Verbal  -PM      Reps 1 10  -PM      Time (Seconds) 1 10  -PM      Exercise 2    Exercise Name 2 Prone GS/TKE  -PM      Cueing 2 Verbal  -PM      Reps 2 20  -PM      Time (Seconds) 2 5  -PM      Exercise 3    Exercise Name 3 supine piriformis S  -PM      Cueing 3 Verbal  -PM      Reps 3 3  -PM      Time (Seconds) 3 30  -PM      Exercise 4    Exercise Name 4 Bridge w/TA-ADD  -PM       Sets 4 2  -PM      Reps 4 10  -PM      Time (Seconds) 4 3  -PM      Exercise 5    Exercise Name 5 LTR  -PM      Reps 5 10  -PM      Time (Seconds) 5 10  -PM      Exercise 6    Exercise Name 6 seated PN w/TA LAQ  -PM      Cueing 6 Verbal  -PM      Sets 6 2  -PM      Reps 6 10  -PM      Exercise 7    Exercise Name 7 seated PN w/TA shoulder hor abd  -PM      Cueing 7 Verbal  -PM      Sets 7 2  -PM      Reps 7 10  -PM      Additional Comments green  -PM      Exercise 8    Exercise Name 8 seated N glides  -PM      Cueing 8 Verbal  -PM      Reps 8 10  -PM      Time (Seconds) 8 10  -PM      Exercise 9    Exercise Name 9 standing hip abd SLR B with TA  -PM      Cueing 9 Verbal  -PM      Sets 9 2  -PM      Reps 9 10  -PM      Exercise 10    Exercise Name 10 see manual  -PM      Exercise 11    Exercise Name 11 prone IR  -PM      Cueing 11 Verbal  -PM      Sets 11 2  -PM      Reps 11 10  -PM      Additional Comments red  -PM        User Key  (r) = Recorded By, (t) = Taken By, (c) = Cosigned By    Initials Name Provider Type    PM More Fink PTA Physical Therapy Assistant                        Manual Rx (last 36 hours)      Manual Treatments       08/10/17 1500          Manual Rx 1    Manual Rx 1 Location IS/SI/piriformis  -PM      Manual Rx 1 Type MET; sacral mobility; MFR L piriformis   L ASIS up and LL short, = after MET; L sacral base more prom  -PM      Manual Rx 1 Duration 5  -PM        User Key  (r) = Recorded By, (t) = Taken By, (c) = Cosigned By    Initials Name Provider Type    PM More Fink PTA Physical Therapy Assistant                PT OP Goals       08/10/17 1500       Time Calculation    PT Goal Re-Cert Due Date 08/15/17   4/4; MD 9/12; 40% impr  -PM       User Key  (r) = Recorded By, (t) = Taken By, (c) = Cosigned By    Initials Name Provider Type    DANIELLE Fink PTA Physical Therapy Assistant                Therapy Education       08/10/17 1500          Therapy Education    Given  HEP;Symptoms/condition management;Pain management  -PM      Program Reinforced   bridge with iso TA  -PM      How Provided Verbal;Demonstration;Written  -PM      Provided to Patient  -PM      Level of Understanding Teach back education performed;Verbalized;Demonstrated  -PM        User Key  (r) = Recorded By, (t) = Taken By, (c) = Cosigned By    Initials Name Provider Type    PM More Fink PTA Physical Therapy Assistant                Time Calculation:   Start Time: 1515  Stop Time: 1625  Time Calculation (min): 70 min  Total Timed Code Minutes- PT: 55 minute(s)    Therapy Charges for Today     Code Description Service Date Service Provider Modifiers Qty    98754907029 HC PT THER PROC EA 15 MIN 8/10/2017 More Fink PTA GP 3    19894563825 HC PT TRACTION LUMBAR 8/10/2017 More Fink PTA GP 1                    More Fink PTA  8/10/2017

## 2017-08-15 ENCOUNTER — HOSPITAL ENCOUNTER (OUTPATIENT)
Dept: PHYSICAL THERAPY | Facility: HOSPITAL | Age: 42
Setting detail: THERAPIES SERIES
Discharge: HOME | End: 2017-08-15

## 2017-08-15 DIAGNOSIS — M51.26 DISPLACEMENT OF LUMBAR INTERVERTEBRAL DISC WITHOUT MYELOPATHY: Primary | ICD-10-CM

## 2017-08-15 PROCEDURE — 97110 THERAPEUTIC EXERCISES: CPT

## 2017-08-15 PROCEDURE — G0283 ELEC STIM OTHER THAN WOUND: HCPCS

## 2017-08-15 PROCEDURE — 97012 MECHANICAL TRACTION THERAPY: CPT

## 2017-08-15 NOTE — THERAPY TREATMENT NOTE
"    Outpatient Physical Therapy Ortho Treatment Note  [facility]  More Fink PTA  08/15/17  6:15 PM       Patient Name: Alin Richard  : 1975  MRN: [id number]  Today's Date: 8/15/2017      Visit Date: 08/15/2017  Subjective Improvement:  \"not now\"     Attendance:   Approved:        18 visits   MD follow up:         2017  RC date:     5/15/2017    Visit Dx:    ICD-10-CM ICD-9-CM   1. Displacement of lumbar intervertebral disc without myelopathy M51.26 722.10       Patient Active Problem List   Diagnosis   • Displacement of lumbar intervertebral disc without myelopathy   • BMI 39.0-39.9,adult   • Non-smoker        Past Medical History:   Diagnosis Date   • Arthritis    • Depression    • Disease of thyroid gland    • Hypertension    • Injury of back         Past Surgical History:   Procedure Laterality Date   • TONSILLECTOMY     • VASECTOMY               PT Ortho       08/15/17 1500    Subjective Comments    Subjective Comments \"Did ok after last session; had worse radicular pain this weekend; only thing I did was mow the lawn, riding mower. Not as good as I was for awhile. A lot of pain last night and getting some to other side as well.\"  -PM    Subjective Pain    Able to rate subjective pain? yes  -PM    Pre-Treatment Pain Level 6   rept AFTER Tx incr radicular this time during traction   -PM    Post-Treatment Pain Level 4  -PM    Posture/Observations    Posture/Observations Comments L ASIS up and leg short supine; improves with MET  -PM      User Key  (r) = Recorded By, (t) = Taken By, (c) = Cosigned By    Initials Name Provider Type    PM More Fink PTA Physical Therapy Assistant                            PT Assessment/Plan       08/15/17 1500       PT Assessment    Functional Limitations Impaired gait;Performance in leisure activities;Performance in self-care ADL;Performance in work activities;Limitation in home management;Limitations in community activities  -PM " "    Impairments Gait;Endurance;Pain;Poor body mechanics;Range of motion;Impaired flexibility  -PM     Assessment Comments Cont to have asymmetry IS/SI with LLD, improves with MET. ? all of HEP completion. Unsure etiology of worsening c/o's although he had mowed his yard which could have contributed. Pt didn't report incr c/o's during traction until after. Needs his re-eval and made need to notify MD if he cont to worsen.  -PM     Rehab Potential Good  -PM     Patient/caregiver participated in establishment of treatment plan and goals Yes  -PM     Patient would benefit from skilled therapy intervention Yes  -PM     PT Plan    PT Frequency 2x/week  -PM     Predicted Duration of Therapy Intervention (days/wks) 4 weeks  -PM     PT Plan Comments ReEval/as advised  -PM       User Key  (r) = Recorded By, (t) = Taken By, (c) = Cosigned By    Initials Name Provider Type    PM More Fink PTA Physical Therapy Assistant                Modalities       08/15/17 1500          Ice    Ice Applied Yes  -PM      Location LS  -PM      Rx Minutes 15 mins  -PM      Ice S/P Rx Yes  -PM      Patient denies application of Ice --  -PM      ELECTRICAL STIMULATION    Attended/Unattended Unattended  -PM      Stimulation Type IFC  -PM      Location/Electrode Placement/Other LS  -PM      Rx Minutes 15 mins  -PM      Traction 82908    Traction Type Lumbar  -PM      Rx Minutes 15  -PM      Position Supine  -PM      Weight 100  -PM      Hold 60  -PM      Relax 10  -PM        User Key  (r) = Recorded By, (t) = Taken By, (c) = Cosigned By    Initials Name Provider Type    PM More Fink PTA Physical Therapy Assistant                Exercises       08/15/17 1500          Subjective Comments    Subjective Comments \"Did ok after last session; had worse radicular pain this weekend; only thing I did was mow the lawn, riding mower. Not as good as I was for awhile. A lot of pain last night and getting some to other side as well.\"  -PM      " Subjective Pain    Able to rate subjective pain? yes  -PM      Pre-Treatment Pain Level 6   rept AFTER Tx incr radicular this time during traction   -PM      Post-Treatment Pain Level 4  -PM      Aquatics    Aquatics performed? No  -PM      Exercise 1    Exercise Name 1 WESTON  -PM      Cueing 1 Verbal  -PM      Reps 1 3  -PM      Time (Seconds) 1 20  -PM      Exercise 2    Exercise Name 2 std ham stretch  -PM      Cueing 2 Verbal  -PM      Reps 2 20  -PM      Time (Seconds) 2 2  -PM      Exercise 3    Exercise Name 3 supine piriformis S  -PM      Cueing 3 Verbal  -PM      Reps 3 2  -PM      Time (Seconds) 3 30  -PM      Exercise 4    Exercise Name 4 Bridge w/TA-ADD  -PM      Sets 4 2  -PM      Reps 4 10  -PM      Time (Seconds) 4 3  -PM      Exercise 5    Exercise Name 5 LTR  -PM      Reps 5 --   verbal review  -PM      Time (Seconds) 5 10  -PM      Exercise 6    Exercise Name 6 seated PN w/TA LAQ  -PM      Cueing 6 Verbal  -PM      Sets 6 2  -PM      Reps 6 10  -PM      Exercise 7    Exercise Name 7 std B hip ext w/TA  -PM      Cueing 7 Verbal  -PM      Sets 7 2  -PM      Reps 7 10  -PM      Exercise 8    Exercise Name 8 seated N glides  -PM      Cueing 8 Verbal  -PM      Reps 8 --   verbal  -PM      Time (Seconds) 8 10  -PM      Exercise 9    Exercise Name 9 standing hip abd SLR B with TA  -PM      Cueing 9 Verbal  -PM      Sets 9 2  -PM      Reps 9 10  -PM      Exercise 10    Exercise Name 10 --  -PM      Exercise 11    Exercise Name 11 prone IR  -PM      Cueing 11 Verbal  -PM      Sets 11 2  -PM      Reps 11 10  -PM      Additional Comments green  -PM        User Key  (r) = Recorded By, (t) = Taken By, (c) = Cosigned By    Initials Name Provider Type    PM More Fink PTA Physical Therapy Assistant                        Manual Rx (last 36 hours)      Manual Treatments       08/15/17 1500          Manual Rx 1    Manual Rx 1 Location IS/SI/Piriformis/Sacrum  -PM      Manual Rx 1 Type MET; sacral mobs; MFR  piriformis  -PM      Manual Rx 1 Duration 5  -PM        User Key  (r) = Recorded By, (t) = Taken By, (c) = Cosigned By    Initials Name Provider Type    PM More Fink PTA Physical Therapy Assistant                PT OP Goals       08/15/17 1500       PT Short Term Goals    STG Date to Achieve 08/15/17  -PM     STG 1 Patient will be independent in home exercise program  -PM     STG 1 Progress Progressing  -PM     STG 2 Patient will have modified Oswestry score of 30% or less  -PM     STG 2 Progress Ongoing  -PM     STG 3 Lumbar flexion range of motion to the floor with no discomfort  -PM     STG 3 Progress Ongoing  -PM     STG 4 Lumbar extension range of motion 25 deg without pain  -PM     STG 4 Progress Ongoing  -PM     STG 5 Patient will report pain as 2/10  -PM     STG 5 Progress Progressing  -PM     Long Term Goals    LTG Date to Achieve 08/29/17  -PM     LTG 1 Patient will have modified Oswestry score of 26% or better  -PM     LTG 1 Progress Ongoing  -PM     LTG 2 Patient to report decreased radicular symptoms  -PM     LTG 2 Progress Progressing  -PM     LTG 3 Subjective improvement 50% or more.  -PM     LTG 3 Progress Progressing  -PM     LTG 4 Patient to nakul 45 min ther ex without increase symptoms  -PM     LTG 4 Progress Ongoing  -PM     LTG 5 Hamstring flexibiity 70 at 90/90 position.  -PM     LTG 5 Progress Ongoing  -PM     Time Calculation    PT Goal Re-Cert Due Date 08/15/17   5/5; MD 9/12  -PM       User Key  (r) = Recorded By, (t) = Taken By, (c) = Cosigned By    Initials Name Provider Type    PM More Fink PTA Physical Therapy Assistant                Therapy Education       08/15/17 1500          Therapy Education    Given HEP;Symptoms/condition management;Pain management  -PM      Program Progressed   prone IR with TB to assist with IS/SI  -PM      How Provided Verbal;Demonstration;Written  -PM      Provided to Patient  -PM      Level of Understanding Teach back education  performed;Verbalized;Demonstrated  -PM        User Key  (r) = Recorded By, (t) = Taken By, (c) = Cosigned By    Initials Name Provider Type    PM More Fink PTA Physical Therapy Assistant                Time Calculation:   Start Time: 1515  Stop Time: 1630  Time Calculation (min): 75 min  Total Timed Code Minutes- PT: 45 minute(s)    Therapy Charges for Today     Code Description Service Date Service Provider Modifiers Qty    20273855578 HC PT THER PROC EA 15 MIN 8/15/2017 More Fink PTA GP 3    15436852553 HC PT TRACTION LUMBAR 8/15/2017 More Fink PTA GP 1    59048661886 HC PT ELECTRICAL STIM UNATTENDED 8/15/2017 More Fink PTA  1    34494003449 HC PT THER SUPP EA 15 MIN 8/15/2017 More Fink PTA GP 1                    More Fink PTA  8/15/2017

## 2017-08-17 ENCOUNTER — HOSPITAL ENCOUNTER (OUTPATIENT)
Dept: PHYSICAL THERAPY | Facility: HOSPITAL | Age: 42
Setting detail: THERAPIES SERIES
Discharge: HOME | End: 2017-08-17

## 2017-08-17 DIAGNOSIS — M51.26 DISPLACEMENT OF LUMBAR INTERVERTEBRAL DISC WITHOUT MYELOPATHY: Primary | ICD-10-CM

## 2017-08-17 PROCEDURE — 97110 THERAPEUTIC EXERCISES: CPT

## 2017-08-17 PROCEDURE — G0283 ELEC STIM OTHER THAN WOUND: HCPCS

## 2017-08-17 PROCEDURE — 97140 MANUAL THERAPY 1/> REGIONS: CPT

## 2017-08-17 NOTE — THERAPY RE-EVALUATION
Outpatient Physical Therapy Ortho Progress Note  [facility]     Patient Name: Alin Richard  : 1975  MRN: [id number]  Today's Date: 2017      Subjective Improvement:  0%    Attendance:   Approved:        18 visits   MD follow up:         2017   date:     2017    Visit Date: 2017    Patient Active Problem List   Diagnosis   • Displacement of lumbar intervertebral disc without myelopathy   • BMI 39.0-39.9,adult   • Non-smoker        Past Medical History:   Diagnosis Date   • Arthritis    • Depression    • Disease of thyroid gland    • Hypertension    • Injury of back         Past Surgical History:   Procedure Laterality Date   • TONSILLECTOMY     • VASECTOMY         Visit Dx:     ICD-10-CM ICD-9-CM   1. Displacement of lumbar intervertebral disc without myelopathy M51.26 722.10                 PT Ortho       17 1500    Subjective Comments    Subjective Comments Patient states the pain is constant and getting worse on the L side. It used to be down the back of the buttocks but not wraps around hip and into the front of the L thigh  -NH    Subjective Pain    Able to rate subjective pain? yes  -NH    Post-Treatment Pain Level 3  -NH    Posture/Observations    Posture/Observations Comments L leg short. L ASIS superior  -NH    Special Tests/Palpation    Special Tests/Palpation --   Positive SLR on L  -NH    ROM (Range of Motion)    General ROM Detail Pain at end range flexion. SB and Ext WFL.  -NH    MMT (Manual Muscle Testing)    General MMT Assessment lower extremity strength deficits identified  -NH    Left Hip    Hip Flexion Gross Movement (3+/5) fair plus   Pain active SLR  -NH    Hip ABduction Gross Movement (4/5) good  -NH    Hip ADduction Gross Movement (4/5) good  -NH    Right Hip    Hip Flexion Gross Movement (4/5) good  -NH    Hip ABduction Gross Movement (4/5) good  -NH    Hip ADduction Gross Movement (4/5) good  -NH    Lower Extremity    Lower Ext  "Manual Muscle Testing left hip strength deficit;right hip strength deficit;left knee strength deficit;right knee strength deficit  -NH    Left Knee    Knee Extension Gross Movement (4/5) good  -NH    Knee Flexion Gross Movement (5/5) normal  -NH    Right Knee    Knee Extension Gross Movement (5/5) normal  -NH    Knee Flexion Gross Movement (5/5) normal  -NH      08/15/17 1500    Subjective Comments    Subjective Comments \"Did ok after last session; had worse radicular pain this weekend; only thing I did was mow the lawn, riding mower. Not as good as I was for awhile. A lot of pain last night and getting some to other side as well.\"  -PM    Subjective Pain    Able to rate subjective pain? yes  -PM    Pre-Treatment Pain Level 6   rept AFTER Tx incr radicular this time during traction   -PM    Post-Treatment Pain Level 4  -PM    Posture/Observations    Posture/Observations Comments L ASIS up and leg short supine; improves with MET  -PM      User Key  (r) = Recorded By, (t) = Taken By, (c) = Cosigned By    Initials Name Provider Type    PM More Fink, PTA Physical Therapy Assistant    NH Dion Tucker, PT Physical Therapist                            Therapy Education       08/17/17 1800          Therapy Education    Education Details Body mechanics, anatomy related to condition and treatment  -NH      Given HEP;Symptoms/condition management;Pain management  -NH      Program Progressed  -NH      How Provided Verbal;Demonstration;Written  -NH      Provided to Patient  -NH      Level of Understanding Teach back education performed;Verbalized;Demonstrated  -NH        User Key  (r) = Recorded By, (t) = Taken By, (c) = Cosigned By    Initials Name Provider Type    NH Dion Tucker, PT Physical Therapist                PT OP Goals       08/17/17 1500       PT Short Term Goals    STG Date to Achieve 08/15/17  -NH     STG 1 Patient will be independent in home exercise program  -NH     STG 1 Progress Progressing  -NH     " STG 2 Patient will have modified Oswestry score of 30% or less  -NH     STG 2 Progress Ongoing  -NH     STG 3 Lumbar flexion range of motion to the floor with no discomfort  -NH     STG 3 Progress Ongoing  -NH     STG 4 Lumbar extension range of motion 25 deg without pain  -NH     STG 4 Progress Ongoing  -NH     STG 5 Patient will report pain as 2/10  -NH     STG 5 Progress Progressing  -NH     Long Term Goals    LTG Date to Achieve 08/29/17  -NH     LTG 1 Patient will have modified Oswestry score of 26% or better  -NH     LTG 1 Progress Ongoing  -NH     LTG 2 Patient to report decreased radicular symptoms  -NH     LTG 2 Progress Progressing  -NH     LTG 3 Subjective improvement 50% or more.  -NH     LTG 3 Progress Progressing  -NH     LTG 4 Patient to nakul 45 min ther ex without increase symptoms  -NH     LTG 4 Progress Ongoing  -NH     LTG 5 Hamstring flexibiity 70 at 90/90 position.  -NH     LTG 5 Progress Ongoing  -NH     Time Calculation    PT Goal Re-Cert Due Date 09/07/17   MD 8/24/17 visit 6/6  -NH       User Key  (r) = Recorded By, (t) = Taken By, (c) = Cosigned By    Initials Name Provider Type    NH Dion Tucker, PT Physical Therapist                PT Assessment/Plan       08/17/17 1800       PT Assessment    Functional Limitations Impaired gait;Performance in leisure activities;Performance in self-care ADL;Performance in work activities;Limitation in home management;Limitations in community activities  -NH     Impairments Gait;Endurance;Pain;Poor body mechanics;Range of motion;Impaired flexibility  -NH     Assessment Comments Patient had been progressing steadily in PT; however, has had a set back within the last week with noted increase in radicular pain. Patient is able to centralize pain with extension exercises and would benefit from continued skilled therapy services to advance postural stabilization and education. Patient requires consistent education to ensure compliance to activity modifications  such as not sitting on wallet, propper lifting techniques and limiting bend/twisting.  -NH     Rehab Potential Good  -NH     Patient/caregiver participated in establishment of treatment plan and goals Yes  -NH     Patient would benefit from skilled therapy intervention Yes  -NH     PT Plan    PT Frequency 2x/week  -NH     Predicted Duration of Therapy Intervention (days/wks) 3-4 weeks  -NH     PT Plan Comments Ary extension exercises. Hold traction until symptoms lessen. Focus on centralization of pain and reducing inflammation. Modify to seated HS stretch only. Neutral spine and body mechanics education.  -NH       User Key  (r) = Recorded By, (t) = Taken By, (c) = Cosigned By    Initials Name Provider Type    NH Dion Tucker, PT Physical Therapist                Modalities       08/17/17 1500          Ice    Location LS  -NH      Rx Minutes 15 mins  -NH      Ice S/P Rx Yes  -NH      ELECTRICAL STIMULATION    Attended/Unattended Unattended  -NH      Stimulation Type IFC  -NH      Location/Electrode Placement/Other LS  -NH      Rx Minutes 15 mins  -NH        User Key  (r) = Recorded By, (t) = Taken By, (c) = Cosigned By    Initials Name Provider Type    NH Dino Tucker, PT Physical Therapist              Exercises       08/17/17 1500          Subjective Comments    Subjective Comments Patient states the pain is constant and getting worse on the L side. It used to be down the back of the buttocks but not wraps around hip and into the front of the L thigh  -NH      Subjective Pain    Able to rate subjective pain? yes  -NH      Pre-Treatment Pain Level 6  -NH      Post-Treatment Pain Level 3  -NH      Aquatics    Aquatics performed? No  -NH      Exercise 1    Exercise Name 1 WESTON  -NH      Time (Minutes) 1 1 minute  -NH      Exercise 2    Exercise Name 2 Prone push ups  -NH      Reps 2 10  -NH      Exercise 3    Exercise Name 3 bridges with TA and hip add  -NH      Cueing 3 Verbal  -NH      Reps 3 10  -NH       Exercise 4    Exercise Name 4 seated PN w/ TA and LAQ  -NH      Reps 4 10  -NH      Exercise 5    Exercise Name 5 seated PN w/ TA and marches  -NH      Reps 5 10  -NH      Exercise 6    Exercise Name 6 seated HS stretch  -NH      Additional Comments DEMO  -NH        User Key  (r) = Recorded By, (t) = Taken By, (c) = Cosigned By    Initials Name Provider Type    NH Dion Tucker, PT Physical Therapist           Manual Rx (last 36 hours)      Manual Treatments       08/17/17 1700          Manual Rx 1    Manual Rx 1 Location Lumbar  -NH      Manual Rx 1 Type Paraspinals STM/Lumbar mobs  -NH      Manual Rx 1 Duration 15  -NH        User Key  (r) = Recorded By, (t) = Taken By, (c) = Cosigned By    Initials Name Provider Type    NH Dion Tucker, PT Physical Therapist                            Outcome Measures       08/17/17 1800          Modified Oswestry    Modified Oswestry Score/Comments 64%  -NH      Functional Assessment    Outcome Measure Options Modifed Owestry  -NH        User Key  (r) = Recorded By, (t) = Taken By, (c) = Cosigned By    Initials Name Provider Type    NH Dion Tucker, PT Physical Therapist            Time Calculation:   Start Time: 1505  Stop Time: 1610  Time Calculation (min): 65 min     Therapy Charges for Today     Code Description Service Date Service Provider Modifiers Qty    78680739191 HC PT THER SUPP EA 15 MIN 8/17/2017 Dion Tucker, PT GP 1    72345501362 HC PT THER PROC EA 15 MIN 8/17/2017 Dion Tucker, PT GP 2    57479301701 HC PT MANUAL THERAPY EA 15 MIN 8/17/2017 Dion Tucker, PT GP 1    74299958253 HC PT ELECTRICAL STIM UNATTENDED 8/17/2017 Dion Tucker, PT  1          PT G-Codes  Outcome Measure Options: Modifed Owestry         Yaz Tucker, PT  8/17/2017

## 2017-08-24 ENCOUNTER — APPOINTMENT (OUTPATIENT)
Dept: PHYSICAL THERAPY | Facility: HOSPITAL | Age: 42
End: 2017-08-24

## 2017-08-24 ENCOUNTER — OFFICE VISIT (OUTPATIENT)
Dept: NEUROSURGERY | Facility: CLINIC | Age: 42
End: 2017-08-24

## 2017-08-24 VITALS
DIASTOLIC BLOOD PRESSURE: 88 MMHG | WEIGHT: 299 LBS | HEIGHT: 74 IN | SYSTOLIC BLOOD PRESSURE: 130 MMHG | BODY MASS INDEX: 38.37 KG/M2

## 2017-08-24 DIAGNOSIS — Z78.9 NON-SMOKER: ICD-10-CM

## 2017-08-24 DIAGNOSIS — M51.26 DISPLACEMENT OF LUMBAR INTERVERTEBRAL DISC WITHOUT MYELOPATHY: Primary | ICD-10-CM

## 2017-08-24 PROCEDURE — 99213 OFFICE O/P EST LOW 20 MIN: CPT | Performed by: NURSE PRACTITIONER

## 2017-08-24 NOTE — PROGRESS NOTES
"    Chief complaint:   Chief Complaint   Patient presents with   • Back Pain     Alin returns for follow up for his back pain, he tried the physical therapy and thought it was helping, but then it just got worse , feels like it now radiates to both the right and left side.         Subjective     HPI: This is a 42-year-old male gentleman who been following up for back and leg pain.  He is here in follow-up today from physical therapy.  The patient states that he is gone through dedicated course of therapy he says the initial week of therapy did notice an improvement in his pain however over the last few weeks she says that he is noticed that his pain in his back has come back and is having pain not only in his left leg but does not to be having pain in his right leg as well scarred into his buttocks.  He says the pain in his legs does feel like a stabbing pain.  The pain is back is a dull aching pain.  He has tried steroids along with anti-inflammatories and muscle relaxers without significant relief.  Rates the pain a scale 0-10 at an 8.    Review of Systems   Musculoskeletal: Positive for back pain.   Neurological: Positive for numbness.         Objective      Vital Signs  /88 (BP Location: Right arm, Patient Position: Sitting)  Ht 74\" (188 cm)  Wt 299 lb (136 kg)  BMI 38.39 kg/m2    Physical Exam   Constitutional: He is oriented to person, place, and time. He appears well-developed and well-nourished.   HENT:   Head: Normocephalic.   Eyes: EOM are normal. Pupils are equal, round, and reactive to light.   Neck: Normal range of motion.   Pulmonary/Chest: Effort normal.   Musculoskeletal: Normal range of motion.        Lumbar back: He exhibits pain.   Neurological: He is alert and oriented to person, place, and time. He has normal strength and normal reflexes. No cranial nerve deficit or sensory deficit. Gait normal. GCS eye subscore is 4. GCS verbal subscore is 5. GCS motor subscore is 6.   Skin: Skin is " warm.   Psychiatric: He has a normal mood and affect. His speech is normal and behavior is normal. Thought content normal.       Results Review: No new imaging          Assessment/Plan: At this point I am going to get an MRI of the patient's lumbar spine without contrast.  Once we have this we can better evaluate if there is a surgical intervention that would be necessary for the patient.  BMI shows that he is very overweight.  BMI chart was given the patient.  He is a nonsmoker.         Alin was seen today for back pain.    Diagnoses and all orders for this visit:    Displacement of lumbar intervertebral disc without myelopathy  -     MRI Lumbar Spine Without Contrast; Future    BMI 38.0-38.9,adult    Non-smoker      I discussed the patients findings and my recommendations with patient  Huber Hyde, WIL  08/24/17  2:25 PM

## 2017-10-03 ENCOUNTER — HOSPITAL ENCOUNTER (INPATIENT)
Facility: HOSPITAL | Age: 42
LOS: 2 days | Discharge: HOME | End: 2017-10-05
Attending: FAMILY MEDICINE | Admitting: FAMILY MEDICINE

## 2017-10-03 ENCOUNTER — APPOINTMENT (OUTPATIENT)
Dept: CT IMAGING | Facility: HOSPITAL | Age: 42
End: 2017-10-03

## 2017-10-03 DIAGNOSIS — N28.0 RENAL INFARCTION (HCC): Primary | ICD-10-CM

## 2017-10-03 PROBLEM — M51.26 HERNIATED INTERVERTEBRAL DISC OF LUMBAR SPINE: Status: ACTIVE | Noted: 2017-10-03

## 2017-10-03 PROBLEM — E07.9 DISEASE OF THYROID GLAND: Status: ACTIVE | Noted: 2017-10-03

## 2017-10-03 PROBLEM — I10 HYPERTENSION: Status: ACTIVE | Noted: 2017-10-03

## 2017-10-03 LAB
ALBUMIN SERPL-MCNC: 4.5 G/DL (ref 3.5–5)
ALBUMIN/GLOB SERPL: 1.5 G/DL (ref 1.1–2.5)
ALP SERPL-CCNC: 70 U/L (ref 24–120)
ALT SERPL W P-5'-P-CCNC: 51 U/L (ref 0–54)
AMYLASE SERPL-CCNC: 35 U/L (ref 30–110)
ANION GAP SERPL CALCULATED.3IONS-SCNC: 14 MMOL/L (ref 4–13)
ANION GAP SERPL CALCULATED.3IONS-SCNC: 14 MMOL/L (ref 4–13)
APTT PPP: 24.1 SECONDS (ref 24.1–34.8)
APTT PPP: 24.9 SECONDS (ref 24.1–34.8)
AST SERPL-CCNC: 31 U/L (ref 7–45)
AT III PPP CHRO-ACNC: 107 % (ref 84–123)
BASOPHILS # BLD AUTO: 0.03 10*3/MM3 (ref 0–0.2)
BASOPHILS # BLD AUTO: 0.04 10*3/MM3 (ref 0–0.2)
BASOPHILS NFR BLD AUTO: 0.3 % (ref 0–2)
BASOPHILS NFR BLD AUTO: 0.4 % (ref 0–2)
BILIRUB SERPL-MCNC: 0.7 MG/DL (ref 0.1–1)
BILIRUB UR QL STRIP: NEGATIVE
BUN BLD-MCNC: 13 MG/DL (ref 5–21)
BUN BLD-MCNC: 16 MG/DL (ref 5–21)
BUN/CREAT SERPL: 14.8 (ref 7–25)
BUN/CREAT SERPL: 17.8 (ref 7–25)
CALCIUM SPEC-SCNC: 9.3 MG/DL (ref 8.4–10.4)
CALCIUM SPEC-SCNC: 9.3 MG/DL (ref 8.4–10.4)
CHLORIDE SERPL-SCNC: 105 MMOL/L (ref 98–110)
CHLORIDE SERPL-SCNC: 107 MMOL/L (ref 98–110)
CLARITY UR: CLEAR
CO2 SERPL-SCNC: 21 MMOL/L (ref 24–31)
CO2 SERPL-SCNC: 24 MMOL/L (ref 24–31)
COLOR UR: YELLOW
CREAT BLD-MCNC: 0.88 MG/DL (ref 0.5–1.4)
CREAT BLD-MCNC: 0.9 MG/DL (ref 0.5–1.4)
CRP SERPL-MCNC: 0.74 MG/DL (ref 0–0.99)
D-LACTATE SERPL-SCNC: 1.1 MMOL/L (ref 0.5–2)
DEPRECATED RDW RBC AUTO: 45.1 FL (ref 40–54)
DEPRECATED RDW RBC AUTO: 45.4 FL (ref 40–54)
EOSINOPHIL # BLD AUTO: 0.05 10*3/MM3 (ref 0–0.7)
EOSINOPHIL # BLD AUTO: 0.07 10*3/MM3 (ref 0–0.7)
EOSINOPHIL NFR BLD AUTO: 0.5 % (ref 0–4)
EOSINOPHIL NFR BLD AUTO: 0.7 % (ref 0–4)
ERYTHROCYTE [DISTWIDTH] IN BLOOD BY AUTOMATED COUNT: 14.1 % (ref 12–15)
ERYTHROCYTE [DISTWIDTH] IN BLOOD BY AUTOMATED COUNT: 14.1 % (ref 12–15)
GFR SERPL CREATININE-BSD FRML MDRD: 92 ML/MIN/1.73
GFR SERPL CREATININE-BSD FRML MDRD: 94 ML/MIN/1.73
GLOBULIN UR ELPH-MCNC: 3.1 GM/DL
GLUCOSE BLD-MCNC: 89 MG/DL (ref 70–100)
GLUCOSE BLD-MCNC: 99 MG/DL (ref 70–100)
GLUCOSE UR STRIP-MCNC: NEGATIVE MG/DL
HCT VFR BLD AUTO: 41.6 % (ref 40–52)
HCT VFR BLD AUTO: 43.4 % (ref 40–52)
HGB BLD-MCNC: 13.9 G/DL (ref 14–18)
HGB BLD-MCNC: 14.4 G/DL (ref 14–18)
HGB UR QL STRIP.AUTO: NEGATIVE
INR PPP: 0.94 (ref 0.91–1.09)
INR PPP: 1.03 (ref 0.91–1.09)
KETONES UR QL STRIP: ABNORMAL
LEUKOCYTE ESTERASE UR QL STRIP.AUTO: NEGATIVE
LIPASE SERPL-CCNC: 18 U/L (ref 23–203)
LYMPHOCYTES # BLD AUTO: 1.69 10*3/MM3 (ref 0.72–4.86)
LYMPHOCYTES # BLD AUTO: 2.33 10*3/MM3 (ref 0.72–4.86)
LYMPHOCYTES NFR BLD AUTO: 17.4 % (ref 15–45)
LYMPHOCYTES NFR BLD AUTO: 21.8 % (ref 15–45)
MCH RBC QN AUTO: 29.5 PG (ref 28–32)
MCH RBC QN AUTO: 29.6 PG (ref 28–32)
MCHC RBC AUTO-ENTMCNC: 33.2 G/DL (ref 33–36)
MCHC RBC AUTO-ENTMCNC: 33.4 G/DL (ref 33–36)
MCV RBC AUTO: 88.3 FL (ref 82–95)
MCV RBC AUTO: 89.1 FL (ref 82–95)
MONOCYTES # BLD AUTO: 0.66 10*3/MM3 (ref 0.19–1.3)
MONOCYTES # BLD AUTO: 0.68 10*3/MM3 (ref 0.19–1.3)
MONOCYTES NFR BLD AUTO: 6.4 % (ref 4–12)
MONOCYTES NFR BLD AUTO: 6.8 % (ref 4–12)
NEUTROPHILS # BLD AUTO: 7.29 10*3/MM3 (ref 1.87–8.4)
NEUTROPHILS # BLD AUTO: 7.58 10*3/MM3 (ref 1.87–8.4)
NEUTROPHILS NFR BLD AUTO: 70.7 % (ref 39–78)
NEUTROPHILS NFR BLD AUTO: 75 % (ref 39–78)
NITRITE UR QL STRIP: NEGATIVE
PH UR STRIP.AUTO: <=5 [PH] (ref 5–8)
PLATELET # BLD AUTO: 253 10*3/MM3 (ref 130–400)
PLATELET # BLD AUTO: 275 10*3/MM3 (ref 130–400)
PMV BLD AUTO: 10.1 FL (ref 6–12)
PMV BLD AUTO: 9.8 FL (ref 6–12)
POTASSIUM BLD-SCNC: 3.8 MMOL/L (ref 3.5–5.3)
POTASSIUM BLD-SCNC: 3.9 MMOL/L (ref 3.5–5.3)
PROT SERPL-MCNC: 7.6 G/DL (ref 6.3–8.7)
PROT UR QL STRIP: NEGATIVE
PROTHROMBIN TIME: 12.9 SECONDS (ref 11.9–14.6)
PROTHROMBIN TIME: 13.8 SECONDS (ref 11.9–14.6)
RBC # BLD AUTO: 4.71 10*6/MM3 (ref 4.8–5.9)
RBC # BLD AUTO: 4.87 10*6/MM3 (ref 4.8–5.9)
SODIUM BLD-SCNC: 142 MMOL/L (ref 135–145)
SODIUM BLD-SCNC: 143 MMOL/L (ref 135–145)
SP GR UR STRIP: 1.01 (ref 1–1.03)
UROBILINOGEN UR QL STRIP: ABNORMAL
WBC NRBC COR # BLD: 10.7 10*3/MM3 (ref 4.8–10.8)
WBC NRBC COR # BLD: 9.72 10*3/MM3 (ref 4.8–10.8)

## 2017-10-03 PROCEDURE — 80053 COMPREHEN METABOLIC PANEL: CPT | Performed by: NURSE PRACTITIONER

## 2017-10-03 PROCEDURE — 93010 ELECTROCARDIOGRAM REPORT: CPT | Performed by: INTERNAL MEDICINE

## 2017-10-03 PROCEDURE — 85220 BLOOC CLOT FACTOR V TEST: CPT | Performed by: FAMILY MEDICINE

## 2017-10-03 PROCEDURE — 25010000002 MORPHINE SULFATE (PF) 2 MG/ML SOLUTION: Performed by: FAMILY MEDICINE

## 2017-10-03 PROCEDURE — 74177 CT ABD & PELVIS W/CONTRAST: CPT

## 2017-10-03 PROCEDURE — 85610 PROTHROMBIN TIME: CPT | Performed by: NURSE PRACTITIONER

## 2017-10-03 PROCEDURE — 85303 CLOT INHIBIT PROT C ACTIVITY: CPT | Performed by: FAMILY MEDICINE

## 2017-10-03 PROCEDURE — 85730 THROMBOPLASTIN TIME PARTIAL: CPT | Performed by: FAMILY MEDICINE

## 2017-10-03 PROCEDURE — 25010000002 MORPHINE PER 10 MG: Performed by: FAMILY MEDICINE

## 2017-10-03 PROCEDURE — 80048 BASIC METABOLIC PNL TOTAL CA: CPT | Performed by: FAMILY MEDICINE

## 2017-10-03 PROCEDURE — 85306 CLOT INHIBIT PROT S FREE: CPT | Performed by: FAMILY MEDICINE

## 2017-10-03 PROCEDURE — 81003 URINALYSIS AUTO W/O SCOPE: CPT | Performed by: NURSE PRACTITIONER

## 2017-10-03 PROCEDURE — 0 IOPAMIDOL 61 % SOLUTION: Performed by: NURSE PRACTITIONER

## 2017-10-03 PROCEDURE — 81241 F5 GENE: CPT | Performed by: FAMILY MEDICINE

## 2017-10-03 PROCEDURE — 85302 CLOT INHIBIT PROT C ANTIGEN: CPT | Performed by: FAMILY MEDICINE

## 2017-10-03 PROCEDURE — 85305 CLOT INHIBIT PROT S TOTAL: CPT | Performed by: FAMILY MEDICINE

## 2017-10-03 PROCEDURE — 99284 EMERGENCY DEPT VISIT MOD MDM: CPT

## 2017-10-03 PROCEDURE — 85610 PROTHROMBIN TIME: CPT | Performed by: FAMILY MEDICINE

## 2017-10-03 PROCEDURE — 93005 ELECTROCARDIOGRAM TRACING: CPT | Performed by: NURSE PRACTITIONER

## 2017-10-03 PROCEDURE — 86140 C-REACTIVE PROTEIN: CPT | Performed by: NURSE PRACTITIONER

## 2017-10-03 PROCEDURE — 85732 THROMBOPLASTIN TIME PARTIAL: CPT | Performed by: FAMILY MEDICINE

## 2017-10-03 PROCEDURE — 25010000002 ONDANSETRON PER 1 MG: Performed by: NURSE PRACTITIONER

## 2017-10-03 PROCEDURE — 82150 ASSAY OF AMYLASE: CPT | Performed by: NURSE PRACTITIONER

## 2017-10-03 PROCEDURE — 85300 ANTITHROMBIN III ACTIVITY: CPT | Performed by: FAMILY MEDICINE

## 2017-10-03 PROCEDURE — 85705 THROMBOPLASTIN INHIBITION: CPT | Performed by: FAMILY MEDICINE

## 2017-10-03 PROCEDURE — 81240 F2 GENE: CPT | Performed by: FAMILY MEDICINE

## 2017-10-03 PROCEDURE — 85613 RUSSELL VIPER VENOM DILUTED: CPT | Performed by: FAMILY MEDICINE

## 2017-10-03 PROCEDURE — 83690 ASSAY OF LIPASE: CPT | Performed by: NURSE PRACTITIONER

## 2017-10-03 PROCEDURE — 83090 ASSAY OF HOMOCYSTEINE: CPT | Performed by: FAMILY MEDICINE

## 2017-10-03 PROCEDURE — 25010000002 HYDROMORPHONE PER 4 MG: Performed by: NURSE PRACTITIONER

## 2017-10-03 PROCEDURE — 85025 COMPLETE CBC W/AUTO DIFF WBC: CPT | Performed by: FAMILY MEDICINE

## 2017-10-03 PROCEDURE — 85670 THROMBIN TIME PLASMA: CPT | Performed by: FAMILY MEDICINE

## 2017-10-03 PROCEDURE — 25010000002 MORPHINE PER 10 MG: Performed by: INTERNAL MEDICINE

## 2017-10-03 PROCEDURE — 25010000002 ENOXAPARIN PER 10 MG: Performed by: FAMILY MEDICINE

## 2017-10-03 PROCEDURE — 85730 THROMBOPLASTIN TIME PARTIAL: CPT | Performed by: NURSE PRACTITIONER

## 2017-10-03 PROCEDURE — 85025 COMPLETE CBC W/AUTO DIFF WBC: CPT | Performed by: NURSE PRACTITIONER

## 2017-10-03 PROCEDURE — 83605 ASSAY OF LACTIC ACID: CPT | Performed by: NURSE PRACTITIONER

## 2017-10-03 RX ORDER — SODIUM CHLORIDE 0.9 % (FLUSH) 0.9 %
10 SYRINGE (ML) INJECTION AS NEEDED
Status: DISCONTINUED | OUTPATIENT
Start: 2017-10-03 | End: 2017-10-05 | Stop reason: HOSPADM

## 2017-10-03 RX ORDER — MORPHINE SULFATE IN 0.9 % NACL 50 MG/50ML
PATIENT CONTROLLED ANALGESIA SYRINGE INTRAVENOUS CONTINUOUS
Status: DISCONTINUED | OUTPATIENT
Start: 2017-10-03 | End: 2017-10-03

## 2017-10-03 RX ORDER — DIAZEPAM 5 MG/1
5 TABLET ORAL EVERY 6 HOURS PRN
Status: DISCONTINUED | OUTPATIENT
Start: 2017-10-03 | End: 2017-10-05 | Stop reason: HOSPADM

## 2017-10-03 RX ORDER — MORPHINE SULFATE IN 0.9 % NACL 50 MG/50ML
PATIENT CONTROLLED ANALGESIA SYRINGE INTRAVENOUS CONTINUOUS
Status: DISCONTINUED | OUTPATIENT
Start: 2017-10-03 | End: 2017-10-05 | Stop reason: HOSPADM

## 2017-10-03 RX ORDER — NALOXONE HCL 0.4 MG/ML
0.1 VIAL (ML) INJECTION
Status: DISCONTINUED | OUTPATIENT
Start: 2017-10-03 | End: 2017-10-05 | Stop reason: HOSPADM

## 2017-10-03 RX ORDER — LISINOPRIL 10 MG/1
10 TABLET ORAL DAILY
Status: DISCONTINUED | OUTPATIENT
Start: 2017-10-03 | End: 2017-10-05 | Stop reason: HOSPADM

## 2017-10-03 RX ORDER — ESCITALOPRAM OXALATE 10 MG/1
20 TABLET ORAL DAILY
Status: DISCONTINUED | OUTPATIENT
Start: 2017-10-03 | End: 2017-10-05 | Stop reason: HOSPADM

## 2017-10-03 RX ORDER — ALUMINA, MAGNESIA, AND SIMETHICONE 2400; 2400; 240 MG/30ML; MG/30ML; MG/30ML
15 SUSPENSION ORAL EVERY 6 HOURS PRN
Status: DISCONTINUED | OUTPATIENT
Start: 2017-10-03 | End: 2017-10-05 | Stop reason: HOSPADM

## 2017-10-03 RX ORDER — MORPHINE SULFATE 2 MG/ML
2 INJECTION, SOLUTION INTRAMUSCULAR; INTRAVENOUS
Status: DISCONTINUED | OUTPATIENT
Start: 2017-10-03 | End: 2017-10-03

## 2017-10-03 RX ORDER — TOPIRAMATE 25 MG/1
50 TABLET ORAL DAILY
Status: DISCONTINUED | OUTPATIENT
Start: 2017-10-03 | End: 2017-10-05 | Stop reason: HOSPADM

## 2017-10-03 RX ORDER — TIZANIDINE 4 MG/1
4 TABLET ORAL EVERY 8 HOURS PRN
Status: DISCONTINUED | OUTPATIENT
Start: 2017-10-03 | End: 2017-10-05 | Stop reason: HOSPADM

## 2017-10-03 RX ORDER — MORPHINE SULFATE 4 MG/ML
4 INJECTION, SOLUTION INTRAMUSCULAR; INTRAVENOUS
Status: DISCONTINUED | OUTPATIENT
Start: 2017-10-03 | End: 2017-10-03

## 2017-10-03 RX ORDER — BISACODYL 5 MG/1
5 TABLET, DELAYED RELEASE ORAL DAILY PRN
Status: DISCONTINUED | OUTPATIENT
Start: 2017-10-03 | End: 2017-10-05 | Stop reason: HOSPADM

## 2017-10-03 RX ORDER — LEVOTHYROXINE SODIUM 0.05 MG/1
50 TABLET ORAL DAILY
Status: DISCONTINUED | OUTPATIENT
Start: 2017-10-03 | End: 2017-10-05 | Stop reason: HOSPADM

## 2017-10-03 RX ORDER — ONDANSETRON 2 MG/ML
4 INJECTION INTRAMUSCULAR; INTRAVENOUS ONCE
Status: COMPLETED | OUTPATIENT
Start: 2017-10-03 | End: 2017-10-03

## 2017-10-03 RX ORDER — LORAZEPAM 1 MG/1
1 TABLET ORAL EVERY 6 HOURS PRN
Status: DISCONTINUED | OUTPATIENT
Start: 2017-10-03 | End: 2017-10-05 | Stop reason: HOSPADM

## 2017-10-03 RX ORDER — SODIUM CHLORIDE 0.9 % (FLUSH) 0.9 %
1-10 SYRINGE (ML) INJECTION AS NEEDED
Status: DISCONTINUED | OUTPATIENT
Start: 2017-10-03 | End: 2017-10-05 | Stop reason: HOSPADM

## 2017-10-03 RX ORDER — HYDROCODONE BITARTRATE AND ACETAMINOPHEN 7.5; 325 MG/1; MG/1
1 TABLET ORAL EVERY 4 HOURS PRN
Status: DISCONTINUED | OUTPATIENT
Start: 2017-10-03 | End: 2017-10-05 | Stop reason: HOSPADM

## 2017-10-03 RX ORDER — OXYCODONE AND ACETAMINOPHEN 7.5; 325 MG/1; MG/1
2 TABLET ORAL EVERY 4 HOURS PRN
Status: DISCONTINUED | OUTPATIENT
Start: 2017-10-03 | End: 2017-10-05 | Stop reason: HOSPADM

## 2017-10-03 RX ORDER — ONDANSETRON 2 MG/ML
4 INJECTION INTRAMUSCULAR; INTRAVENOUS EVERY 6 HOURS PRN
Status: DISCONTINUED | OUTPATIENT
Start: 2017-10-03 | End: 2017-10-05 | Stop reason: HOSPADM

## 2017-10-03 RX ADMIN — MORPHINE SULFATE 2 MG: 2 INJECTION, SOLUTION INTRAMUSCULAR; INTRAVENOUS at 17:30

## 2017-10-03 RX ADMIN — OXYCODONE HYDROCHLORIDE AND ACETAMINOPHEN 2 TABLET: 7.5; 325 TABLET ORAL at 20:52

## 2017-10-03 RX ADMIN — SODIUM CHLORIDE 1000 ML: 9 INJECTION, SOLUTION INTRAVENOUS at 14:25

## 2017-10-03 RX ADMIN — ENOXAPARIN SODIUM 130 MG: 150 INJECTION SUBCUTANEOUS at 17:30

## 2017-10-03 RX ADMIN — IOPAMIDOL 150 ML: 612 INJECTION, SOLUTION INTRAVENOUS at 15:32

## 2017-10-03 RX ADMIN — Medication: at 19:16

## 2017-10-03 RX ADMIN — Medication: at 22:44

## 2017-10-03 RX ADMIN — TIZANIDINE HYDROCHLORIDE 4 MG: 4 TABLET ORAL at 21:43

## 2017-10-03 RX ADMIN — HYDROMORPHONE HYDROCHLORIDE 1 MG: 1 INJECTION, SOLUTION INTRAMUSCULAR; INTRAVENOUS; SUBCUTANEOUS at 14:18

## 2017-10-03 RX ADMIN — HYDROMORPHONE HYDROCHLORIDE 0.5 MG: 1 INJECTION, SOLUTION INTRAMUSCULAR; INTRAVENOUS; SUBCUTANEOUS at 16:17

## 2017-10-03 RX ADMIN — ONDANSETRON 4 MG: 2 INJECTION INTRAMUSCULAR; INTRAVENOUS at 14:18

## 2017-10-03 RX ADMIN — TOPIRAMATE 50 MG: 25 TABLET, FILM COATED ORAL at 20:54

## 2017-10-03 NOTE — H&P
[facility] Medicine Services  HISTORY AND PHYSICAL    Date of Admission: 10/3/2017  Primary Care Physician: Lev Stallworth MD    Subjective     Chief Complaint:   Left flank discomfort    History of Present Illness  This 42-year-old white male has a 20 hour history of escalating right flank pain.  Patient states the pain began last night at roughly 10 PM and has gradually worsened since that time.  The patient is accompanied by occasional nausea at its peak.  the patient felt that he had an exacerbation of a herniated disc at L4-L5 so he took muscle relaxants and a Norco without relief.  The pain gradually worsened and he presented to the emergency department for further evaluation.    Review of Systems   Constitutional: Negative.  Negative for activity change and chills.   HENT: Negative.    Eyes: Negative.    Respiratory: Negative.    Cardiovascular: Negative.  Negative for chest pain.   Gastrointestinal: Positive for abdominal pain. Negative for abdominal distention and blood in stool.   Endocrine: Negative.    Genitourinary: Positive for flank pain. Negative for difficulty urinating and dysuria.   Musculoskeletal: Positive for back pain. Negative for gait problem.   Skin: Negative.    Allergic/Immunologic: Negative.    Neurological: Negative.  Negative for dizziness and headaches.   Hematological: Negative.    Psychiatric/Behavioral: Negative.      Otherwise complete ROS reviewed and negative except as mentioned in the HPI.    Past Medical History:     Past Medical History:   Diagnosis Date   • Arthritis    • Depression    • Disease of thyroid gland    • Hypertension    • Injury of back        Past Surgical History:  Past Surgical History:   Procedure Laterality Date   • TONSILLECTOMY     • VASECTOMY         Family History:   family history includes Aneurysm in his father; Arthritis in his mother; Clotting disorder in his mother; Hypertension in his mother; No Known  "Problems in his brother and sister.    Social History:    reports that he has never smoked. His smokeless tobacco use includes Snuff. He reports that he drinks alcohol. He reports that he does not use illicit drugs.    Medications:  Prior to Admission medications    Medication Sig Start Date End Date Taking? Authorizing Provider   diclofenac (VOLTAREN) 75 MG EC tablet Take 1 tablet by mouth 2 (Two) Times a Day. 7/18/17  Yes WIL Ross   Escitalopram Oxalate (LEXAPRO PO) Take  by mouth.   Yes Historical Provider, MD   HYDROcodone-acetaminophen (NORCO)  MG per tablet Take 1 tablet by mouth Every 6 (Six) Hours As Needed for Moderate Pain (4-6). 5/24/17  Yes Shira Crawford MD   Levothyroxine Sodium (SYNTHROID PO) Take  by mouth.   Yes Historical Provider, MD   LISINOPRIL PO Take  by mouth.   Yes Historical Provider, MD   TESTOSTERONE CYPIONATE IM Inject  into the shoulder, thigh, or buttocks.   Yes Historical Provider, MD   tiZANidine (ZANAFLEX) 4 MG tablet Take 1 tablet by mouth Every 8 (Eight) Hours As Needed for Muscle Spasms. 7/18/17  Yes WIL Ross   Topiramate (TOPAMAX PO) Take  by mouth.   Yes Historical Provider, MD   diazePAM (VALIUM) 5 MG tablet Take 1 tablet by mouth Every 6 (Six) Hours As Needed for Anxiety. 5/24/17   Shira Crawford MD   Hydrocodone-Acetaminophen (NORCO PO) Take  by mouth.    Historical Provider, MD   MethylPREDNISolone (MEDROL, ERASTO,) 4 MG tablet Take as directed on package instructions. 7/18/17   WIL Ross       Allergies:  Allergies   Allergen Reactions   • Eggs Or Egg-Derived Products        Objective     Vital Signs:   /76  Pulse 75  Temp 97.8 °F (36.6 °C)  Resp 18  Ht 74\" (188 cm)  Wt 292 lb (132 kg)  SpO2 98%  BMI 37.49 kg/m2    Physical Exam   Constitutional: He is oriented to person, place, and time. He appears well-developed and well-nourished. He is cooperative.   HENT:   Head: Normocephalic and atraumatic. "   Right Ear: External ear normal.   Left Ear: External ear normal.   Nose: Nose normal.   Mouth/Throat: Oropharynx is clear and moist.   Eyes: Conjunctivae and EOM are normal. Pupils are equal, round, and reactive to light. No scleral icterus.   Neck: No JVD present. No tracheal deviation present. No thyromegaly present.   Cardiovascular: Normal rate, regular rhythm, normal heart sounds and intact distal pulses.    No murmur heard.  Pulmonary/Chest: Effort normal and breath sounds normal. No respiratory distress. He has no wheezes. He has no rales.   Abdominal: Bowel sounds are normal. He exhibits no distension and no mass. There is no tenderness.   Musculoskeletal: Normal range of motion. He exhibits no edema or tenderness.   Neurological: He is alert and oriented to person, place, and time. He has normal reflexes. No cranial nerve deficit. Coordination normal.   Skin: Skin is warm and dry. No rash noted.   Psychiatric: He has a normal mood and affect. His behavior is normal. Judgment and thought content normal.     Results Reviewed:   Specimen:  Blood Updated:  10/03/17 1447     WBC 9.72 10*3/mm3      RBC 4.71 (L) 10*6/mm3      Hemoglobin 13.9 (L) g/dL      Hematocrit 41.6 %      MCV 88.3 fL      MCH 29.5 pg      MCHC 33.4 g/dL      RDW 14.1 %      RDW-SD 45.1 fl      MPV 9.8 fL      Platelets 253 10*3/mm3      Neutrophil % 75.0 %      Lymphocyte % 17.4 %      Monocyte % 6.8 %      Eosinophil % 0.5 %      Basophil % 0.3 %      Neutrophils, Absolute 7.29 10*3/mm3      Lymphocytes, Absolute 1.69 10*3/mm3      Monocytes, Absolute 0.66 10*3/mm3      Eosinophils, Absolute 0.05 10*3/mm3      Basophils, Absolute 0.03 10*3/mm3     Lactic Acid, Plasma [[id number]]  (Normal) Collected:  10/03/17 1418    Specimen:  Blood Updated:  10/03/17 1451     Lactate 1.1 mmol/L     C-reactive Protein [[id number]]  (Normal) Collected:  10/03/17 1418    Specimen:  Blood Updated:  10/03/17 1454     C-Reactive Protein 0.74 mg/dL      Comprehensive Metabolic Panel [[id number]]  (Abnormal) Collected:  10/03/17 1418    Specimen:  Blood Updated:  10/03/17 1454     Glucose 99 mg/dL      BUN 16 mg/dL      Creatinine 0.90 mg/dL      Sodium 142 mmol/L      Potassium 3.9 mmol/L      Chloride 107 mmol/L      CO2 21.0 (L) mmol/L      Calcium 9.3 mg/dL      Total Protein 7.6 g/dL      Albumin 4.50 g/dL      ALT (SGPT) 51 U/L      AST (SGOT) 31 U/L      Alkaline Phosphatase 70 U/L      Total Bilirubin 0.7 mg/dL      eGFR Non African Amer 92 mL/min/1.73      Globulin 3.1 gm/dL      A/G Ratio 1.5 g/dL      BUN/Creatinine Ratio 17.8     Anion Gap 14.0 (H) mmol/L     Lipase [[id number]]  (Abnormal) Collected:  10/03/17 1418    Specimen:  Blood Updated:  10/03/17 1454     Lipase 18 (L) U/L     Amylase [[id number]]  (Normal) Collected:  10/03/17 1418    Specimen:  Blood Updated:  10/03/17 1454     Amylase 35 U/L     Urinalysis With / Culture If Indicated - Urine, Clean Catch [[id number]]  (Abnormal) Collected:  10/03/17 1516    Specimen:  Urine from Urine, Clean Catch Updated:  10/03/17 1527     Color, UA Yellow     Appearance, UA Clear     pH, UA <=5.0     Specific Gravity, UA 1.009     Glucose, UA Negative     Ketones, UA Trace (A)     Bilirubin, UA Negative     Blood, UA Negative     Protein, UA Negative     Leuk Esterase, UA Negative     Nitrite, UA Negative     Urobilinogen, UA 0.2 E.U./dL    Narrative:       Urine microscopic not indicated.    Protime-INR [[id number]]  (Normal) Collected:  10/03/17 1617    Specimen:  Blood Updated:  10/03/17 1641     Protime 13.8 Seconds      INR 1.03    aPTT [[id number]]  (Normal) Collected:  10/03/17 1617    Specimen:  Blood Updated:  10/03/17 1641     PTT 24.1 seconds         Ct Abdomen Pelvis With Contrast    Result Date: 10/3/2017  Narrative: EXAMINATION: CT ABDOMEN PELVIS W CONTRAST-   10/3/2017 1:59 PM CST  HISTORY: Left lower quadrant abdominal pain and left flank pain. Pain radiates from back to front.  In order to have a  CT radiation dose as low as reasonably achievable Automated Exposure Control was utilized for adjustment of the mA and/or KV according to patient size.  DLP in mGycm= 1150.  Discrete 4 x 2 cm nonenhancing parenchymal focus at the upper pole of the left kidney compatible with focal renal infarction. There is no atherosclerotic disease seen involving the upper aorta or renal arteries and evaluation for embolic disease is recommended.  The only comparison study is a noncontrast CT from 05/24/17. An infarct likely would not be seen on a noncontrast study. No renal abnormality could be seen at that time.  Given the patient's left-sided symptoms this finding must be considered to be acute.  Normal heart size. Clear lung bases. A few small hepatic cysts are again seen. Otherwise normal liver. Normal gallbladder, pancreas, spleen, adrenal glands, and right.  No bowel dilation. No free fluid. No appendicitis or diverticulitis.  No pelvic mass or fluid.  Summary: 1. Focal infarction of the upper pole of the left kidney. There is no evidence of atherosclerotic vascular disease and evaluation for embolic disease is recommended.            This report was finalized on 10/03/2017 15:51 by Dr. Edgar Gómez MD.     I have personally reviewed and interpreted the radiology studies and ECG obtained at time of admission.     Assessment / Plan      Assessment & Plan  Hospital Problem List     * (Principal)Renal infarction    Hypertension    Herniated intervertebral disc of lumbar spine    Disease of thyroid gland        PLAN:   Admit to the medical surgical floor with continuous cardiac monitoring  Echocardiogram with bubble study  Laboratory analysis for genetic coagulopathies  Consult vascular surgery  Therapeutic Lovenox    Code Status:   Full code  Surrogate decision-maker is the patient's wife    I discussed the patients findings and my recommendations with: The patient and his wife    Estimated length of stay: 2-3 days    Kunal  ALONSO Cross DO   10/03/17   5:27 PM

## 2017-10-03 NOTE — ED NOTES
Patient has pain in left flank area that radiates to front of abdomen; Doctor Anna has the patient an MRI scheduled for 10/28/17 to further evaluation of lower back issues.     Lesly Gonzalez RN  10/03/17 2886

## 2017-10-03 NOTE — ED PROVIDER NOTES
Subjective   HPI Comments: Patient is a 42-year-old  male presents ER today with complaint of flank pain and abdominal pain.  Patient reports slightly that he had some left-sided low back pain that radiated around to his left lower quadrant.  Patient states the pain is continued until today.  He reports it is felt nauseated denies any vomiting, constipation, diarrhea.  Denies fever or hematuria.  Patient states that he had a similar episode of this in July and they determined this that he had some bulging disks.  Following up with neurosurgery for this and is scheduled for an MRI later this month.  Patient states this pain is different however.  He states that he did take his Norco and a muscle earlier today however did not help. The pt denies any saddle anesthesias; he denies any loss of bowel or bladder control. He reports pain down posterior legs but states that this has been occurring for some time. Patient presents ER today for further evaluation.    Patient is a 42 y.o. male presenting with abdominal pain.   History provided by:  Patient   used: No    Abdominal Pain   Pain location:  L flank and LLQ  Pain quality: sharp and stabbing    Pain radiates to:  LLQ  Pain severity:  Mild  Onset quality:  Sudden  Duration:  1 day  Timing:  Constant  Progression:  Unchanged  Chronicity:  New  Context: not alcohol use, not awakening from sleep, not diet changes, not eating, not laxative use, not medication withdrawal, not previous surgeries, not recent illness, not recent sexual activity, not recent travel, not retching, not sick contacts, not suspicious food intake and not trauma    Relieved by:  Nothing  Worsened by:  Nothing  Ineffective treatments:  None tried  Associated symptoms: nausea    Associated symptoms: no anorexia, no belching, no chest pain, no chills, no constipation, no cough, no diarrhea, no dysuria, no fatigue, no fever, no flatus, no hematemesis, no hematochezia, no  hematuria, no melena, no shortness of breath, no sore throat, no vaginal bleeding, no vaginal discharge and no vomiting    Risk factors: no alcohol abuse, no aspirin use, has not had multiple surgeries, no NSAID use, not obese, not pregnant and no recent hospitalization        Review of Systems   Constitutional: Negative for chills, fatigue and fever.   HENT: Negative for sore throat.    Respiratory: Negative for cough and shortness of breath.    Cardiovascular: Negative for chest pain.   Gastrointestinal: Positive for abdominal pain and nausea. Negative for anorexia, constipation, diarrhea, flatus, hematemesis, hematochezia, melena and vomiting.   Genitourinary: Negative for dysuria, hematuria, vaginal bleeding and vaginal discharge.   All other systems reviewed and are negative.      Past Medical History:   Diagnosis Date   • Arthritis    • Depression    • Disease of thyroid gland    • Hypertension    • Injury of back        Allergies   Allergen Reactions   • Eggs Or Egg-Derived Products        Past Surgical History:   Procedure Laterality Date   • TONSILLECTOMY     • VASECTOMY         Family History   Problem Relation Age of Onset   • Arthritis Mother    • Hypertension Mother    • Aneurysm Father    • No Known Problems Sister    • No Known Problems Brother        Social History     Social History   • Marital status:      Spouse name: N/A   • Number of children: N/A   • Years of education: N/A     Social History Main Topics   • Smoking status: Never Smoker   • Smokeless tobacco: Current User     Types: Snuff   • Alcohol use Yes      Comment: occassional   • Drug use: No   • Sexual activity: Defer     Other Topics Concern   • None     Social History Narrative           Objective   Physical Exam   Constitutional: He is oriented to person, place, and time. He appears well-developed and well-nourished.   HENT:   Head: Normocephalic and atraumatic.   Eyes: Conjunctivae are normal. Pupils are equal, round, and  reactive to light.   Neck: Normal range of motion.   Cardiovascular: Normal rate, regular rhythm and normal heart sounds.    Pulmonary/Chest: Effort normal and breath sounds normal.   Abdominal: Soft. Bowel sounds are normal. There is tenderness in the left lower quadrant. There is CVA tenderness.   Musculoskeletal: Normal range of motion.   Neurological: He is alert and oriented to person, place, and time.   Skin: Skin is warm and dry.   Psychiatric: He has a normal mood and affect.   Nursing note and vitals reviewed.      Procedures         ED Course  ED Course   Comment By Time   Pt CT scan reviewed with Dr. Zhong at this time. Call placed to urology to discuss findings. Rosanne Gasca, APRN 10/03 1605   Spoke with Dr. Rinaldi; advised of the renal infarction; he has no further recommendations for urology standpoint and has advised that we call vascular to discuss this. Rosanne Gasca, APRN 10/03 1631   Call and spoke to ELAINA Pearson with Dr. Ames; Dr. Ames reviewed the pt CT scan; recommends intial coagulation with Lovenox 1mg/kg SQ and admission to hospitalist to determine why the pt has the renal infarction. Rosanne Gasca, APRN 10/03 1631   I discussed the findings with the patient and his wife.Patient denies any black tarry stools.  Denies any bloody stools or vomitting of blood. He denies any recent head injuries. He denies any previous history of Coagulation disorders.  I will go ahead and give the Lovenox.  I have placed a call to the hospitalist to discuss admission. Rosanne Gasca, APRN 10/03 1640   Spoke with Dr. Cross; the pt will be admitted in stable cond. Rosanne Gasca, APRN 10/03 1643   The pt denies any LE pain, chest pain or shortness of breath. Rosanne Gasca, APRN 10/03 1647      CT Abdomen Pelvis With Contrast   Final Result          Lab Results (last 24 hours)     Procedure Component Value Units Date/Time    CBC & Differential [[id number]] Collected:  10/03/17 3706     Specimen:  Blood Updated:  10/03/17 1447    Narrative:       The following orders were created for panel order CBC & Differential.  Procedure                               Abnormality         Status                     ---------                               -----------         ------                     CBC Auto Differential[[id number]]        Abnormal            Final result                 Please view results for these tests on the individual orders.    Comprehensive Metabolic Panel [[id number]]  (Abnormal) Collected:  10/03/17 1418    Specimen:  Blood Updated:  10/03/17 1454     Glucose 99 mg/dL      BUN 16 mg/dL      Creatinine 0.90 mg/dL      Sodium 142 mmol/L      Potassium 3.9 mmol/L      Chloride 107 mmol/L      CO2 21.0 (L) mmol/L      Calcium 9.3 mg/dL      Total Protein 7.6 g/dL      Albumin 4.50 g/dL      ALT (SGPT) 51 U/L      AST (SGOT) 31 U/L      Alkaline Phosphatase 70 U/L      Total Bilirubin 0.7 mg/dL      eGFR Non African Amer 92 mL/min/1.73      Globulin 3.1 gm/dL      A/G Ratio 1.5 g/dL      BUN/Creatinine Ratio 17.8     Anion Gap 14.0 (H) mmol/L     Lipase [[id number]]  (Abnormal) Collected:  10/03/17 1418    Specimen:  Blood Updated:  10/03/17 1454     Lipase 18 (L) U/L     Amylase [[id number]]  (Normal) Collected:  10/03/17 1418    Specimen:  Blood Updated:  10/03/17 1454     Amylase 35 U/L     Lactic Acid, Plasma [[id number]]  (Normal) Collected:  10/03/17 1418    Specimen:  Blood Updated:  10/03/17 1451     Lactate 1.1 mmol/L     C-reactive Protein [[id number]]  (Normal) Collected:  10/03/17 1418    Specimen:  Blood Updated:  10/03/17 1454     C-Reactive Protein 0.74 mg/dL     CBC Auto Differential [[id number]]  (Abnormal) Collected:  10/03/17 1418    Specimen:  Blood Updated:  10/03/17 1447     WBC 9.72 10*3/mm3      RBC 4.71 (L) 10*6/mm3      Hemoglobin 13.9 (L) g/dL      Hematocrit 41.6 %      MCV 88.3 fL      MCH 29.5 pg      MCHC 33.4 g/dL      RDW 14.1 %      RDW-SD 45.1 fl      MPV 9.8  fL      Platelets 253 10*3/mm3      Neutrophil % 75.0 %      Lymphocyte % 17.4 %      Monocyte % 6.8 %      Eosinophil % 0.5 %      Basophil % 0.3 %      Neutrophils, Absolute 7.29 10*3/mm3      Lymphocytes, Absolute 1.69 10*3/mm3      Monocytes, Absolute 0.66 10*3/mm3      Eosinophils, Absolute 0.05 10*3/mm3      Basophils, Absolute 0.03 10*3/mm3     Urinalysis With / Culture If Indicated - Urine, Clean Catch [[id number]]  (Abnormal) Collected:  10/03/17 1516    Specimen:  Urine from Urine, Clean Catch Updated:  10/03/17 1527     Color, UA Yellow     Appearance, UA Clear     pH, UA <=5.0     Specific Gravity, UA 1.009     Glucose, UA Negative     Ketones, UA Trace (A)     Bilirubin, UA Negative     Blood, UA Negative     Protein, UA Negative     Leuk Esterase, UA Negative     Nitrite, UA Negative     Urobilinogen, UA 0.2 E.U./dL    Narrative:       Urine microscopic not indicated.    Protime-INR [[id number]]  (Normal) Collected:  10/03/17 1617    Specimen:  Blood Updated:  10/03/17 1641     Protime 13.8 Seconds      INR 1.03    aPTT [[id number]]  (Normal) Collected:  10/03/17 1617    Specimen:  Blood Updated:  10/03/17 1641     PTT 24.1 seconds                 MDM  Number of Diagnoses or Management Options  Renal infarction: new and requires workup     Amount and/or Complexity of Data Reviewed  Clinical lab tests: ordered and reviewed  Tests in the radiology section of CPT®: ordered and reviewed  Tests in the medicine section of CPT®: ordered and reviewed  Decide to obtain previous medical records or to obtain history from someone other than the patient: yes  Discuss the patient with other providers: yes    Patient Progress  Patient progress: stable      Final diagnoses:   Renal infarction            Rosanne Gasca, APRN  10/03/17 7979

## 2017-10-04 ENCOUNTER — APPOINTMENT (OUTPATIENT)
Dept: ULTRASOUND IMAGING | Facility: HOSPITAL | Age: 42
End: 2017-10-04

## 2017-10-04 ENCOUNTER — APPOINTMENT (OUTPATIENT)
Dept: CARDIOLOGY | Facility: HOSPITAL | Age: 42
End: 2017-10-04
Attending: FAMILY MEDICINE

## 2017-10-04 PROBLEM — Q21.12 PFO (PATENT FORAMEN OVALE): Status: ACTIVE | Noted: 2017-10-04

## 2017-10-04 LAB
ANION GAP SERPL CALCULATED.3IONS-SCNC: 12 MMOL/L (ref 4–13)
BASOPHILS # BLD AUTO: 0.03 10*3/MM3 (ref 0–0.2)
BASOPHILS NFR BLD AUTO: 0.3 % (ref 0–2)
BH CV ECHO MEAS - AO MAX PG (FULL): 2.1 MMHG
BH CV ECHO MEAS - AO MAX PG: 6.1 MMHG
BH CV ECHO MEAS - AO MEAN PG (FULL): 2 MMHG
BH CV ECHO MEAS - AO MEAN PG: 4 MMHG
BH CV ECHO MEAS - AO ROOT AREA (BSA CORRECTED): 1.1
BH CV ECHO MEAS - AO ROOT AREA: 6.6 CM^2
BH CV ECHO MEAS - AO ROOT DIAM: 2.9 CM
BH CV ECHO MEAS - AO V2 MAX: 123 CM/SEC
BH CV ECHO MEAS - AO V2 MEAN: 88.6 CM/SEC
BH CV ECHO MEAS - AO V2 VTI: 27.4 CM
BH CV ECHO MEAS - AVA(I,A): 2.5 CM^2
BH CV ECHO MEAS - AVA(I,D): 2.5 CM^2
BH CV ECHO MEAS - AVA(V,A): 2.5 CM^2
BH CV ECHO MEAS - AVA(V,D): 2.5 CM^2
BH CV ECHO MEAS - BSA(HAYCOCK): 2.7 M^2
BH CV ECHO MEAS - BSA: 2.6 M^2
BH CV ECHO MEAS - BZI_BMI: 38.1 KILOGRAMS/M^2
BH CV ECHO MEAS - BZI_METRIC_HEIGHT: 188 CM
BH CV ECHO MEAS - BZI_METRIC_WEIGHT: 134.7 KG
BH CV ECHO MEAS - EDV(CUBED): 132.7 ML
BH CV ECHO MEAS - EDV(TEICH): 123.8 ML
BH CV ECHO MEAS - EF(CUBED): 72.9 %
BH CV ECHO MEAS - EF(TEICH): 64.4 %
BH CV ECHO MEAS - ESV(CUBED): 35.9 ML
BH CV ECHO MEAS - ESV(TEICH): 44.1 ML
BH CV ECHO MEAS - FS: 35.3 %
BH CV ECHO MEAS - IVS/LVPW: 0.89
BH CV ECHO MEAS - IVSD: 0.8 CM
BH CV ECHO MEAS - LA DIMENSION: 3.5 CM
BH CV ECHO MEAS - LA/AO: 1.2
BH CV ECHO MEAS - LAT PEAK E' VEL: 12.6 CM/SEC
BH CV ECHO MEAS - LV MASS(C)D: 151.8 GRAMS
BH CV ECHO MEAS - LV MASS(C)DI: 59.1 GRAMS/M^2
BH CV ECHO MEAS - LV MAX PG: 3.9 MMHG
BH CV ECHO MEAS - LV MEAN PG: 2 MMHG
BH CV ECHO MEAS - LV V1 MAX: 99 CM/SEC
BH CV ECHO MEAS - LV V1 MEAN: 70.7 CM/SEC
BH CV ECHO MEAS - LV V1 VTI: 21.8 CM
BH CV ECHO MEAS - LVIDD: 5.1 CM
BH CV ECHO MEAS - LVIDS: 3.3 CM
BH CV ECHO MEAS - LVOT AREA (M): 3.1 CM^2
BH CV ECHO MEAS - LVOT AREA: 3.1 CM^2
BH CV ECHO MEAS - LVOT DIAM: 2 CM
BH CV ECHO MEAS - LVPWD: 0.9 CM
BH CV ECHO MEAS - MED PEAK E' VEL: 12.1 CM/SEC
BH CV ECHO MEAS - MV A MAX VEL: 50 CM/SEC
BH CV ECHO MEAS - MV DEC TIME: 0.19 SEC
BH CV ECHO MEAS - MV E MAX VEL: 70.8 CM/SEC
BH CV ECHO MEAS - MV E/A: 1.4
BH CV ECHO MEAS - SI(AO): 70.4 ML/M^2
BH CV ECHO MEAS - SI(CUBED): 37.6 ML/M^2
BH CV ECHO MEAS - SI(LVOT): 26.6 ML/M^2
BH CV ECHO MEAS - SI(TEICH): 31 ML/M^2
BH CV ECHO MEAS - SV(AO): 181 ML
BH CV ECHO MEAS - SV(CUBED): 96.7 ML
BH CV ECHO MEAS - SV(LVOT): 68.5 ML
BH CV ECHO MEAS - SV(TEICH): 79.7 ML
BUN BLD-MCNC: 11 MG/DL (ref 5–21)
BUN/CREAT SERPL: 11.2 (ref 7–25)
CALCIUM SPEC-SCNC: 8.9 MG/DL (ref 8.4–10.4)
CHLORIDE SERPL-SCNC: 107 MMOL/L (ref 98–110)
CO2 SERPL-SCNC: 24 MMOL/L (ref 24–31)
CREAT BLD-MCNC: 0.98 MG/DL (ref 0.5–1.4)
DEPRECATED RDW RBC AUTO: 47.4 FL (ref 40–54)
E/E' RATIO: 5.9
EOSINOPHIL # BLD AUTO: 0.12 10*3/MM3 (ref 0–0.7)
EOSINOPHIL NFR BLD AUTO: 1.3 % (ref 0–4)
ERYTHROCYTE [DISTWIDTH] IN BLOOD BY AUTOMATED COUNT: 14.1 % (ref 12–15)
GFR SERPL CREATININE-BSD FRML MDRD: 83 ML/MIN/1.73
GLUCOSE BLD-MCNC: 107 MG/DL (ref 70–100)
HCT VFR BLD AUTO: 39.9 % (ref 40–52)
HGB BLD-MCNC: 13 G/DL (ref 14–18)
IMM GRANULOCYTES # BLD: 0.02 10*3/MM3 (ref 0–0.03)
IMM GRANULOCYTES NFR BLD: 0.2 % (ref 0–5)
LEFT ATRIUM VOLUME INDEX: 28.3 ML/M2
LEFT ATRIUM VOLUME: 72.8 CM3
LV EF 2D ECHO EST: 60 %
LYMPHOCYTES # BLD AUTO: 2.79 10*3/MM3 (ref 0.72–4.86)
LYMPHOCYTES NFR BLD AUTO: 30.6 % (ref 15–45)
MCH RBC QN AUTO: 29.9 PG (ref 28–32)
MCHC RBC AUTO-ENTMCNC: 32.6 G/DL (ref 33–36)
MCV RBC AUTO: 91.7 FL (ref 82–95)
MONOCYTES # BLD AUTO: 0.82 10*3/MM3 (ref 0.19–1.3)
MONOCYTES NFR BLD AUTO: 9 % (ref 4–12)
NEUTROPHILS # BLD AUTO: 5.35 10*3/MM3 (ref 1.87–8.4)
NEUTROPHILS NFR BLD AUTO: 58.6 % (ref 39–78)
PLATELET # BLD AUTO: 230 10*3/MM3 (ref 130–400)
PMV BLD AUTO: 10 FL (ref 6–12)
POTASSIUM BLD-SCNC: 4.1 MMOL/L (ref 3.5–5.3)
RBC # BLD AUTO: 4.35 10*6/MM3 (ref 4.8–5.9)
SODIUM BLD-SCNC: 143 MMOL/L (ref 135–145)
WBC NRBC COR # BLD: 9.13 10*3/MM3 (ref 4.8–10.8)

## 2017-10-04 PROCEDURE — 80048 BASIC METABOLIC PNL TOTAL CA: CPT | Performed by: FAMILY MEDICINE

## 2017-10-04 PROCEDURE — 93306 TTE W/DOPPLER COMPLETE: CPT | Performed by: INTERNAL MEDICINE

## 2017-10-04 PROCEDURE — 99222 1ST HOSP IP/OBS MODERATE 55: CPT | Performed by: NURSE PRACTITIONER

## 2017-10-04 PROCEDURE — 93306 TTE W/DOPPLER COMPLETE: CPT

## 2017-10-04 PROCEDURE — 93970 EXTREMITY STUDY: CPT

## 2017-10-04 PROCEDURE — 99254 IP/OBS CNSLTJ NEW/EST MOD 60: CPT | Performed by: INTERNAL MEDICINE

## 2017-10-04 PROCEDURE — 85025 COMPLETE CBC W/AUTO DIFF WBC: CPT | Performed by: FAMILY MEDICINE

## 2017-10-04 PROCEDURE — 25010000002 ENOXAPARIN PER 10 MG: Performed by: FAMILY MEDICINE

## 2017-10-04 PROCEDURE — 25010000002 MORPHINE PER 10 MG: Performed by: INTERNAL MEDICINE

## 2017-10-04 PROCEDURE — 93970 EXTREMITY STUDY: CPT | Performed by: SURGERY

## 2017-10-04 RX ADMIN — ENOXAPARIN SODIUM 140 MG: 150 INJECTION SUBCUTANEOUS at 17:26

## 2017-10-04 RX ADMIN — LEVOTHYROXINE SODIUM 50 MCG: 50 TABLET ORAL at 08:26

## 2017-10-04 RX ADMIN — ENOXAPARIN SODIUM 140 MG: 150 INJECTION SUBCUTANEOUS at 05:33

## 2017-10-04 RX ADMIN — Medication: at 07:24

## 2017-10-04 RX ADMIN — TOPIRAMATE 50 MG: 25 TABLET, FILM COATED ORAL at 20:50

## 2017-10-04 RX ADMIN — TIZANIDINE HYDROCHLORIDE 4 MG: 4 TABLET ORAL at 20:50

## 2017-10-04 RX ADMIN — OXYCODONE HYDROCHLORIDE AND ACETAMINOPHEN 2 TABLET: 7.5; 325 TABLET ORAL at 12:49

## 2017-10-04 RX ADMIN — LISINOPRIL 10 MG: 10 TABLET ORAL at 08:26

## 2017-10-04 RX ADMIN — OXYCODONE HYDROCHLORIDE AND ACETAMINOPHEN 2 TABLET: 7.5; 325 TABLET ORAL at 08:34

## 2017-10-04 RX ADMIN — ESCITSLOPRAM 20 MG: 10 TABLET ORAL at 08:26

## 2017-10-04 RX ADMIN — OXYCODONE HYDROCHLORIDE AND ACETAMINOPHEN 2 TABLET: 7.5; 325 TABLET ORAL at 04:17

## 2017-10-04 RX ADMIN — OXYCODONE HYDROCHLORIDE AND ACETAMINOPHEN 2 TABLET: 7.5; 325 TABLET ORAL at 17:31

## 2017-10-04 NOTE — PROGRESS NOTES
"Pharmacy Dosing Service  Anticoagulant  Enoxaparin    Assessment/Action/Plan:  Start enoxaparin 1mg/kg 140mg subq q 12h for renal infarction.  Will adjust accordingly. Continue to follow               Subjective:  Alin Richard is a 42 y.o. male on Enoxaparin 140 mg SQ every 12 hours for indication of   .  Objective:renal infarction.  [Ht: 74\" (188 cm); Wt: 298 lb 12.8 oz (136 kg); BMI: Body mass index is 38.36 kg/(m^2).]  Estimated Creatinine Clearance: 157.6 mL/min (by C-G formula based on Cr of 0.88).   Lab Results   Component Value Date    INR 0.94 10/03/2017    INR 1.03 10/03/2017    INR 0.93 05/24/2017    PROTIME 12.9 10/03/2017    PROTIME 13.8 10/03/2017    PROTIME 12.7 05/24/2017      Lab Results   Component Value Date    HGB 14.4 10/03/2017    HGB 13.9 (L) 10/03/2017    HGB 14.9 05/24/2017      Lab Results   Component Value Date     10/03/2017     10/03/2017     05/24/2017       Wen Zaman RPH  10/03/17 7:37 PM     "

## 2017-10-04 NOTE — CONSULTS
Alin Richard  [id number]  [id number]  [birth year]  Kunal Cross DO  10/3/2017      HPI: Alin Richard is a 42 y.o. male who presented to ER with complaint of flank pain and abdominal pain.  He initially reported some left-sided low back pain radiating to his left lower quadrant and reports that he had similar symptoms in July which was determined to be due to bulging disks.  He did have some associated nausea but denied any vomiting constipation or diarrhea.  He is following with neurosurgery for this bulging discs later this month.  I did speak with Rosanne DE LA TORRE regarding CTA findings.  His CTA of abdomen and pelvis showed a focal left kidney infarction.  He has no history of atrial fibrillation or clotting disorder.  He does report that his mother has history of blood disorders and anticoagulated, but unsure of diagnosis.  His pain is better, but still present.    Past Medical History:   Diagnosis Date   • Arthritis    • Depression    • Disease of thyroid gland    • Hypertension    • Injury of back        Past Surgical History:   Procedure Laterality Date   • TONSILLECTOMY     • VASECTOMY         Family History   Problem Relation Age of Onset   • Arthritis Mother    • Hypertension Mother    • Clotting disorder Mother    • Aneurysm Father    • No Known Problems Sister    • No Known Problems Brother        Social History     Social History   • Marital status:      Spouse name: N/A   • Number of children: N/A   • Years of education: N/A     Occupational History   • Not on file.     Social History Main Topics   • Smoking status: Never Smoker   • Smokeless tobacco: Current User     Types: Snuff   • Alcohol use Yes      Comment: occassional   • Drug use: No   • Sexual activity: Yes     Partners: Female     Other Topics Concern   • Not on file     Social History Narrative   • No narrative on file       Allergies   Allergen Reactions   • Eggs Or Egg-Derived Products        Hospital Medications (active)        Dose Frequency Start End    aluminum-magnesium hydroxide-simethicone (MAALOX MAX) 400-400-40 MG/5ML suspension 15 mL 15 mL Every 6 Hours PRN 10/3/2017     Sig - Route: Take 15 mL by mouth Every 6 (Six) Hours As Needed for Heartburn. - Oral    bisacodyl (DULCOLAX) EC tablet 5 mg 5 mg Daily PRN 10/3/2017     Sig - Route: Take 1 tablet by mouth Daily As Needed for Constipation. - Oral    diazePAM (VALIUM) tablet 5 mg 5 mg Every 6 Hours PRN 10/3/2017     Sig - Route: Take 1 tablet by mouth Every 6 (Six) Hours As Needed for Anxiety. - Oral    enoxaparin (LOVENOX) injection 130 mg 1 mg/kg × 132 kg Once 10/3/2017 10/3/2017    Sig - Route: Inject 0.87 mL under the skin 1 (One) Time. - Subcutaneous    Cosign for Ordering: Accepted by Kunal Cross DO on 10/3/2017  4:59 PM    enoxaparin (LOVENOX) injection 140 mg 1 mg/kg × 136 kg Every 12 Hours 10/4/2017     Sig - Route: Inject 0.93 mL under the skin Every 12 (Twelve) Hours. - Subcutaneous    escitalopram (LEXAPRO) tablet 20 mg 20 mg Daily 10/3/2017     Sig - Route: Take 2 tablets by mouth Daily. - Oral    HYDROcodone-acetaminophen (NORCO) 7.5-325 MG per tablet 1 tablet 1 tablet Every 4 Hours PRN 10/3/2017 10/13/2017    Sig - Route: Take 1 tablet by mouth Every 4 (Four) Hours As Needed for Moderate Pain . - Oral    HYDROmorphone (DILAUDID) injection 0.5 mg 0.5 mg Once 10/3/2017 10/3/2017    Sig - Route: Infuse 0.5 mL into a venous catheter 1 (One) Time. - Intravenous    Cosign for Ordering: Required by José Zhong MD    HYDROmorphone (DILAUDID) injection 1 mg 1 mg Once 10/3/2017 10/3/2017    Sig - Route: Infuse 1 mL into a venous catheter 1 (One) Time. - Intravenous    Cosign for Ordering: Required by Kathy Whiteside DO    iopamidol (ISOVUE-300) 61 % injection 150 mL 150 mL Once in Imaging 10/3/2017 10/3/2017    Sig - Route: Infuse 150 mL into a venous catheter Once. - Intravenous    levothyroxine (SYNTHROID, LEVOTHROID) tablet 50 mcg 50 mcg Daily 10/3/2017      Sig - Route: Take 1 tablet by mouth Daily. - Oral    lisinopril (PRINIVIL,ZESTRIL) tablet 10 mg 10 mg Daily 10/3/2017     Sig - Route: Take 1 tablet by mouth Daily. - Oral    LORazepam (ATIVAN) tablet 1 mg 1 mg Every 6 Hours PRN 10/3/2017 10/13/2017    Sig - Route: Take 1 tablet by mouth Every 6 (Six) Hours As Needed for Anxiety. - Oral    morphine sulfate PCA 1 mg/mL 50 mL syringe  Continuous 10/3/2017 10/17/2017    Sig - Route: Infuse  into a venous catheter Continuous. - Intravenous    naloxone (NARCAN) injection 0.1 mg 0.1 mg Every 5 Minutes PRN 10/3/2017     Sig - Route: Infuse 0.25 mL into a venous catheter Every 5 (Five) Minutes As Needed for Opioid Reversal or Respiratory Depression (see administration instructions). - Intravenous    ondansetron (ZOFRAN) injection 4 mg 4 mg Once 10/3/2017 10/3/2017    Sig - Route: Infuse 2 mL into a venous catheter 1 (One) Time. - Intravenous    Cosign for Ordering: Required by Kathy Whiteside, DO    ondansetron (ZOFRAN) injection 4 mg 4 mg Every 6 Hours PRN 10/3/2017     Sig - Route: Infuse 2 mL into a venous catheter Every 6 (Six) Hours As Needed for Nausea or Vomiting. - Intravenous    oxyCODONE-acetaminophen (PERCOCET) 7.5-325 MG per tablet 2 tablet 2 tablet Every 4 Hours PRN 10/3/2017 10/13/2017    Sig - Route: Take 2 tablets by mouth Every 4 (Four) Hours As Needed for Severe Pain . - Oral    Pharmacy to Dose enoxaparin (LOVENOX)  Continuous PRN 10/3/2017     Sig - Route: Continuous As Needed for Consult. - Does not apply    sodium chloride 0.9 % bolus 1,000 mL 1,000 mL Once 10/3/2017 10/3/2017    Sig - Route: Infuse 1,000 mL into a venous catheter 1 (One) Time. - Intravenous    Cosign for Ordering: Required by Kathy Whiteside, DO    sodium chloride 0.9 % flush 1-10 mL 1-10 mL As Needed 10/3/2017     Sig - Route: Infuse 1-10 mL into a venous catheter As Needed for Line Care. - Intravenous    sodium chloride 0.9 % flush 10 mL 10 mL As Needed 10/3/2017     Sig -  "Route: Infuse 10 mL into a venous catheter As Needed for Line Care. - Intravenous    Cosign for Ordering: Required by Kathy Whiteside DO    Linked Group 1:  \"And\" Linked Group Details        tiZANidine (ZANAFLEX) tablet 4 mg 4 mg Every 8 Hours PRN 10/3/2017     Sig - Route: Take 1 tablet by mouth Every 8 (Eight) Hours As Needed for Muscle Spasms. - Oral    topiramate (TOPAMAX) tablet 50 mg 50 mg Daily 10/3/2017     Sig - Route: Take 2 tablets by mouth Daily. - Oral    Morphine sulfate (PF) injection 2 mg (Discontinued) 2 mg Every 3 Hours PRN 10/3/2017 10/3/2017    Sig - Route: Infuse 1 mL into a venous catheter Every 3 (Three) Hours As Needed for Moderate Pain  or Severe Pain . - Intravenous    Morphine sulfate (PF) injection 2 mg (Discontinued) 2 mg Every 1 Hour PRN 10/3/2017 10/3/2017    Sig - Route: Infuse 1 mL into a venous catheter Every 1 (One) Hour As Needed for Moderate Pain  or Severe Pain . - Intravenous    Morphine sulfate (PF) injection 4 mg (Discontinued) 4 mg Every 3 Hours PRN 10/3/2017 10/3/2017    Sig - Route: Infuse 1 mL into a venous catheter Every 3 (Three) Hours As Needed for Moderate Pain  or Severe Pain . - Intravenous    morphine sulfate PCA 1 mg/mL 50 mL syringe (Discontinued)  Continuous 10/3/2017 10/3/2017    Sig - Route: Infuse  into a venous catheter Continuous. - Intravenous    morphine sulfate PCA 1 mg/mL 50 mL syringe (Discontinued)  Continuous 10/3/2017 10/3/2017    Sig - Route: Infuse  into a venous catheter Continuous. - Intravenous          Review of Systems   Constitutional: Negative.    HENT: Negative.    Eyes: Negative.    Respiratory: Negative.    Cardiovascular: Negative.    Gastrointestinal: Positive for abdominal pain (left flank pain) and nausea.   Endocrine: Negative.    Genitourinary: Negative.    Musculoskeletal: Positive for arthralgias and back pain.   Skin: Negative.    Allergic/Immunologic: Negative.    Neurological: Negative.    Hematological: Negative.  "   Psychiatric/Behavioral: Negative.    All other systems reviewed and are negative.      Physical Exam   Constitutional: He is oriented to person, place, and time. He appears well-developed and well-nourished.   HENT:   Head: Normocephalic and atraumatic.   Eyes: Pupils are equal, round, and reactive to light. No scleral icterus.   Neck: Normal range of motion. Neck supple. No thyromegaly present.   Cardiovascular: Normal rate, regular rhythm, normal heart sounds and intact distal pulses.    Pulmonary/Chest: Effort normal and breath sounds normal.   Abdominal: Soft. Bowel sounds are normal.   Left flank pain   Musculoskeletal: Normal range of motion.   Neurological: He is alert and oriented to person, place, and time.   Skin: Skin is warm and dry.   Psychiatric: He has a normal mood and affect. His behavior is normal. Judgment and thought content normal.   Nursing note and vitals reviewed.      Laboratory Data:    Results from last 7 days  Lab Units 10/04/17  0530 10/03/17  1842 10/03/17  1418   WBC 10*3/mm3 9.13 10.70 9.72   HEMOGLOBIN g/dL 13.0* 14.4 13.9*   HEMATOCRIT % 39.9* 43.4 41.6   PLATELETS 10*3/mm3 230 275 253         Results from last 7 days  Lab Units 10/04/17  0530 10/03/17  1842 10/03/17  1418   SODIUM mmol/L 143 143 142   POTASSIUM mmol/L 4.1 3.8 3.9   CHLORIDE mmol/L 107 105 107   CO2 mmol/L 24.0 24.0 21.0*   BUN mg/dL 11 13 16   CREATININE mg/dL 0.98 0.88 0.90   CALCIUM mg/dL 8.9 9.3 9.3   BILIRUBIN mg/dL  --   --  0.7   ALK PHOS U/L  --   --  70   ALT (SGPT) U/L  --   --  51   AST (SGOT) U/L  --   --  31   GLUCOSE mg/dL 107* 89 99       Results from last 7 days  Lab Units 10/03/17  1842 10/03/17  1617   INR  0.94 1.03   APTT seconds 24.9 24.1         Diagnostic Data:  Imaging Results (last 24 hours)     Procedure Component Value Units Date/Time    CT Abdomen Pelvis With Contrast [[id number]] Collected:  10/03/17 1541     Updated:  10/03/17 1554    Narrative:       EXAMINATION: CT ABDOMEN PELVIS W  CONTRAST-      10/3/2017 1:59 PM CST     HISTORY: Left lower quadrant abdominal pain and left flank pain. Pain  radiates from back to front.     In order to have a CT radiation dose as low as reasonably achievable  Automated Exposure Control was utilized for adjustment of the mA and/or  KV according to patient size.     DLP in mGycm= 1150.     Discrete 4 x 2 cm nonenhancing parenchymal focus at the upper pole of  the left kidney compatible with focal renal infarction. There is no  atherosclerotic disease seen involving the upper aorta or renal arteries  and evaluation for embolic disease is recommended.     The only comparison study is a noncontrast CT from 05/24/17. An infarct  likely would not be seen on a noncontrast study. No renal abnormality  could be seen at that time.     Given the patient's left-sided symptoms this finding must be considered  to be acute.     Normal heart size.  Clear lung bases.  A few small hepatic cysts are again seen.  Otherwise normal liver.  Normal gallbladder, pancreas, spleen, adrenal glands, and right.     No bowel dilation.  No free fluid.  No appendicitis or diverticulitis.     No pelvic mass or fluid.     Summary:  1. Focal infarction of the upper pole of the left kidney. There is no  evidence of atherosclerotic vascular disease and evaluation for embolic  disease is recommended.                                   This report was finalized on 10/03/2017 15:51 by Dr. Edgar Gómez MD.          Impression:  Principal Problem:    Renal infarction  Active Problems:    Hypertension    Herniated intervertebral disc of lumbar spine    Disease of thyroid gland      Plan: After thoroughly evaluating Alin Richard, I believe the best course of action is to remain conservative from a vascular standpoint.  Dr. Ames did review his CTA of the abdomen and pelvis closely, which shows his renal arteries to be widely patent.  There is no atherosclerosis noted in the upper aorta or renal  arteries.  I did discuss with Rosanne DE LA TORRE in ER.  He will need workup, which appears to have been started, to find where he is embolizing from.  He does need to be anticoagulated with Lovenox initially as discussed with ER.  There is no intervention required from vascular surgery.  Thank you for allowing me to participate in the care of your patient.  Please do not hesitate to call with any questions or concerns.  This was all discussed in full with complete understanding.     WIL Grover       Attestation: The patient was seen/examined at the bedside.  Agree with above evaluation.  Patient does not require any surgical intervention from my standpoint.  His renal arteries are widely patent and likely embolized from a more proximal source.  He needs to be anticoagulated going forward and then a full workup is to be performed trying to locate the source of the clot.  This was all discussed in full with complete understanding.

## 2017-10-04 NOTE — PAYOR COMM NOTE
"[facility]    [place]   [phone]  OR  FAX  [phone]    REF:  [id number]    Alin Guzman (42 y.o. Male)     Date of Birth Social Security Number Address Home Phone MRN    [id number]  [address]  [facility] [id number] [phone] [id number]    Hindu Marital Status          Voodoo        Admission Date Admission Type Admitting Provider Attending Provider Department, Room/Bed    10/3/17 Emergency Kunal Cross DO Robinson, Maurice S, DO [facility] 4C, 487/1    Discharge Date Discharge Disposition Discharge Destination                      Attending Provider: Kunal Cross DO     Allergies:  Eggs Or Egg-derived Products    Isolation:  None   Infection:  None   Code Status:  FULL    Ht:  74\" (188 cm)   Wt:  297 lb (135 kg)    Admission Cmt:  None   Principal Problem:  Renal infarction [N28.0]                 Active Insurance as of 10/3/2017     Primary Coverage     Payor Plan Insurance Group Employer/Plan Group    [employer] [id number]     Payor Plan Address Payor Plan Phone Number Effective From Effective To    [address] [phone] 7/1/2016     [place] [address]       Subscriber Name Subscriber Birth Date Member ID       ALIN GUZMAN 1975 [id number]                 Emergency Contacts      (Rel.) Home Phone Work Phone Mobile Phone    Keke Guzman (Spouse) [phone] -- --               History & Physical      Kunal Cross DO at 10/3/2017  5:26 PM              [facility] Medicine Services  HISTORY AND PHYSICAL    Date of Admission: 10/3/2017  Primary Care Physician: Lev Stallworth MD    Subjective     Chief Complaint:   Left flank discomfort    History of Present Illness  This 42-year-old white male has a 20 hour history of escalating right flank pain.  Patient states the pain began last night at roughly 10 PM and has gradually worsened " since that time.  The patient is accompanied by occasional nausea at its peak.  the patient felt that he had an exacerbation of a herniated disc at L4-L5 so he took muscle relaxants and a Norco without relief.  The pain gradually worsened and he presented to the emergency department for further evaluation.    Review of Systems   Constitutional: Negative.  Negative for activity change and chills.   HENT: Negative.    Eyes: Negative.    Respiratory: Negative.    Cardiovascular: Negative.  Negative for chest pain.   Gastrointestinal: Positive for abdominal pain. Negative for abdominal distention and blood in stool.   Endocrine: Negative.    Genitourinary: Positive for flank pain. Negative for difficulty urinating and dysuria.   Musculoskeletal: Positive for back pain. Negative for gait problem.   Skin: Negative.    Allergic/Immunologic: Negative.    Neurological: Negative.  Negative for dizziness and headaches.   Hematological: Negative.    Psychiatric/Behavioral: Negative.      Otherwise complete ROS reviewed and negative except as mentioned in the HPI.    Past Medical History:     Past Medical History:   Diagnosis Date   • Arthritis    • Depression    • Disease of thyroid gland    • Hypertension    • Injury of back        Past Surgical History:  Past Surgical History:   Procedure Laterality Date   • TONSILLECTOMY     • VASECTOMY         Family History:   family history includes Aneurysm in his father; Arthritis in his mother; Clotting disorder in his mother; Hypertension in his mother; No Known Problems in his brother and sister.    Social History:    reports that he has never smoked. His smokeless tobacco use includes Snuff. He reports that he drinks alcohol. He reports that he does not use illicit drugs.    Medications:  Prior to Admission medications    Medication Sig Start Date End Date Taking? Authorizing Provider   diclofenac (VOLTAREN) 75 MG EC tablet Take 1 tablet by mouth 2 (Two) Times a Day. 7/18/17  Yes  "WIL Ross   Escitalopram Oxalate (LEXAPRO PO) Take  by mouth.   Yes Historical Provider, MD   HYDROcodone-acetaminophen (NORCO)  MG per tablet Take 1 tablet by mouth Every 6 (Six) Hours As Needed for Moderate Pain (4-6). 5/24/17  Yes Shira Crawford MD   Levothyroxine Sodium (SYNTHROID PO) Take  by mouth.   Yes Historical Provider, MD   LISINOPRIL PO Take  by mouth.   Yes Historical Provider, MD   TESTOSTERONE CYPIONATE IM Inject  into the shoulder, thigh, or buttocks.   Yes Historical Provider, MD   tiZANidine (ZANAFLEX) 4 MG tablet Take 1 tablet by mouth Every 8 (Eight) Hours As Needed for Muscle Spasms. 7/18/17  Yes WIL Ross   Topiramate (TOPAMAX PO) Take  by mouth.   Yes Historical Provider, MD   diazePAM (VALIUM) 5 MG tablet Take 1 tablet by mouth Every 6 (Six) Hours As Needed for Anxiety. 5/24/17   Shira Crawford MD   Hydrocodone-Acetaminophen (NORCO PO) Take  by mouth.    Historical Provider, MD   MethylPREDNISolone (MEDROL, ERASTO,) 4 MG tablet Take as directed on package instructions. 7/18/17   WIL Ross       Allergies:  Allergies   Allergen Reactions   • Eggs Or Egg-Derived Products        Objective     Vital Signs:   /76  Pulse 75  Temp 97.8 °F (36.6 °C)  Resp 18  Ht 74\" (188 cm)  Wt 292 lb (132 kg)  SpO2 98%  BMI 37.49 kg/m2    Physical Exam   Constitutional: He is oriented to person, place, and time. He appears well-developed and well-nourished. He is cooperative.   HENT:   Head: Normocephalic and atraumatic.   Right Ear: External ear normal.   Left Ear: External ear normal.   Nose: Nose normal.   Mouth/Throat: Oropharynx is clear and moist.   Eyes: Conjunctivae and EOM are normal. Pupils are equal, round, and reactive to light. No scleral icterus.   Neck: No JVD present. No tracheal deviation present. No thyromegaly present.   Cardiovascular: Normal rate, regular rhythm, normal heart sounds and intact distal pulses.    No murmur " heard.  Pulmonary/Chest: Effort normal and breath sounds normal. No respiratory distress. He has no wheezes. He has no rales.   Abdominal: Bowel sounds are normal. He exhibits no distension and no mass. There is no tenderness.   Musculoskeletal: Normal range of motion. He exhibits no edema or tenderness.   Neurological: He is alert and oriented to person, place, and time. He has normal reflexes. No cranial nerve deficit. Coordination normal.   Skin: Skin is warm and dry. No rash noted.   Psychiatric: He has a normal mood and affect. His behavior is normal. Judgment and thought content normal.     Results Reviewed:   Specimen:  Blood Updated:  10/03/17 1447     WBC 9.72 10*3/mm3      RBC 4.71 (L) 10*6/mm3      Hemoglobin 13.9 (L) g/dL      Hematocrit 41.6 %      MCV 88.3 fL      MCH 29.5 pg      MCHC 33.4 g/dL      RDW 14.1 %      RDW-SD 45.1 fl      MPV 9.8 fL      Platelets 253 10*3/mm3      Neutrophil % 75.0 %      Lymphocyte % 17.4 %      Monocyte % 6.8 %      Eosinophil % 0.5 %      Basophil % 0.3 %      Neutrophils, Absolute 7.29 10*3/mm3      Lymphocytes, Absolute 1.69 10*3/mm3      Monocytes, Absolute 0.66 10*3/mm3      Eosinophils, Absolute 0.05 10*3/mm3      Basophils, Absolute 0.03 10*3/mm3     Lactic Acid, Plasma [[id number]]  (Normal) Collected:  10/03/17 1418    Specimen:  Blood Updated:  10/03/17 1451     Lactate 1.1 mmol/L     C-reactive Protein [[id number]]  (Normal) Collected:  10/03/17 1418    Specimen:  Blood Updated:  10/03/17 1454     C-Reactive Protein 0.74 mg/dL     Comprehensive Metabolic Panel [[id number]]  (Abnormal) Collected:  10/03/17 1418    Specimen:  Blood Updated:  10/03/17 1454     Glucose 99 mg/dL      BUN 16 mg/dL      Creatinine 0.90 mg/dL      Sodium 142 mmol/L      Potassium 3.9 mmol/L      Chloride 107 mmol/L      CO2 21.0 (L) mmol/L      Calcium 9.3 mg/dL      Total Protein 7.6 g/dL      Albumin 4.50 g/dL      ALT (SGPT) 51 U/L      AST (SGOT) 31 U/L      Alkaline Phosphatase 70  U/L      Total Bilirubin 0.7 mg/dL      eGFR Non African Amer 92 mL/min/1.73      Globulin 3.1 gm/dL      A/G Ratio 1.5 g/dL      BUN/Creatinine Ratio 17.8     Anion Gap 14.0 (H) mmol/L     Lipase [[id number]]  (Abnormal) Collected:  10/03/17 1418    Specimen:  Blood Updated:  10/03/17 1454     Lipase 18 (L) U/L     Amylase [[id number]]  (Normal) Collected:  10/03/17 1418    Specimen:  Blood Updated:  10/03/17 1454     Amylase 35 U/L     Urinalysis With / Culture If Indicated - Urine, Clean Catch [[id number]]  (Abnormal) Collected:  10/03/17 1516    Specimen:  Urine from Urine, Clean Catch Updated:  10/03/17 1527     Color, UA Yellow     Appearance, UA Clear     pH, UA <=5.0     Specific Gravity, UA 1.009     Glucose, UA Negative     Ketones, UA Trace (A)     Bilirubin, UA Negative     Blood, UA Negative     Protein, UA Negative     Leuk Esterase, UA Negative     Nitrite, UA Negative     Urobilinogen, UA 0.2 E.U./dL    Narrative:       Urine microscopic not indicated.    Protime-INR [[id number]]  (Normal) Collected:  10/03/17 1617    Specimen:  Blood Updated:  10/03/17 1641     Protime 13.8 Seconds      INR 1.03    aPTT [[id number]]  (Normal) Collected:  10/03/17 1617    Specimen:  Blood Updated:  10/03/17 1641     PTT 24.1 seconds         Ct Abdomen Pelvis With Contrast    Result Date: 10/3/2017  Narrative: EXAMINATION: CT ABDOMEN PELVIS W CONTRAST-   10/3/2017 1:59 PM CST  HISTORY: Left lower quadrant abdominal pain and left flank pain. Pain radiates from back to front.  In order to have a CT radiation dose as low as reasonably achievable Automated Exposure Control was utilized for adjustment of the mA and/or KV according to patient size.  DLP in mGycm= 1150.  Discrete 4 x 2 cm nonenhancing parenchymal focus at the upper pole of the left kidney compatible with focal renal infarction. There is no atherosclerotic disease seen involving the upper aorta or renal arteries and evaluation for embolic disease is recommended.   The only comparison study is a noncontrast CT from 05/24/17. An infarct likely would not be seen on a noncontrast study. No renal abnormality could be seen at that time.  Given the patient's left-sided symptoms this finding must be considered to be acute.  Normal heart size. Clear lung bases. A few small hepatic cysts are again seen. Otherwise normal liver. Normal gallbladder, pancreas, spleen, adrenal glands, and right.  No bowel dilation. No free fluid. No appendicitis or diverticulitis.  No pelvic mass or fluid.  Summary: 1. Focal infarction of the upper pole of the left kidney. There is no evidence of atherosclerotic vascular disease and evaluation for embolic disease is recommended.            This report was finalized on 10/03/2017 15:51 by Dr. Edgar Gómez MD.     I have personally reviewed and interpreted the radiology studies and ECG obtained at time of admission.     Assessment / Plan      Assessment & Plan  Hospital Problem List     * (Principal)Renal infarction    Hypertension    Herniated intervertebral disc of lumbar spine    Disease of thyroid gland        PLAN:   Admit to the medical surgical floor with continuous cardiac monitoring  Echocardiogram with bubble study  Laboratory analysis for genetic coagulopathies  Consult vascular surgery  Therapeutic Lovenox    Code Status:   Full code  Surrogate decision-maker is the patient's wife    I discussed the patients findings and my recommendations with: The patient and his wife    Estimated length of stay: 2-3 days    Kunal Cross DO   10/03/17   5:27 PM                 Electronically signed by Kunal Cross DO at 10/3/2017  5:38 PM           Emergency Department Notes      Lesly Gonzalze RN at 10/3/2017  1:40 PM          Patient has pain in left flank area that radiates to front of abdomen; Doctor Anna has the patient an MRI scheduled for 10/28/17 to further evaluation of lower back issues.     Lesly Gonzalez RN  10/03/17 4990        Electronically signed by Lesly Gonzalez RN at 10/3/2017  1:41 PM      WIL Whiting at 10/3/2017  2:08 PM          Subjective   HPI Comments: Patient is a 42-year-old  male presents ER today with complaint of flank pain and abdominal pain.  Patient reports slightly that he had some left-sided low back pain that radiated around to his left lower quadrant.  Patient states the pain is continued until today.  He reports it is felt nauseated denies any vomiting, constipation, diarrhea.  Denies fever or hematuria.  Patient states that he had a similar episode of this in July and they determined this that he had some bulging disks.  Following up with neurosurgery for this and is scheduled for an MRI later this month.  Patient states this pain is different however.  He states that he did take his Norco and a muscle earlier today however did not help. The pt denies any saddle anesthesias; he denies any loss of bowel or bladder control. He reports pain down posterior legs but states that this has been occurring for some time. Patient presents ER today for further evaluation.    Patient is a 42 y.o. male presenting with abdominal pain.   History provided by:  Patient   used: No    Abdominal Pain   Pain location:  L flank and LLQ  Pain quality: sharp and stabbing    Pain radiates to:  LLQ  Pain severity:  Mild  Onset quality:  Sudden  Duration:  1 day  Timing:  Constant  Progression:  Unchanged  Chronicity:  New  Context: not alcohol use, not awakening from sleep, not diet changes, not eating, not laxative use, not medication withdrawal, not previous surgeries, not recent illness, not recent sexual activity, not recent travel, not retching, not sick contacts, not suspicious food intake and not trauma    Relieved by:  Nothing  Worsened by:  Nothing  Ineffective treatments:  None tried  Associated symptoms: nausea    Associated symptoms: no anorexia, no belching, no chest pain, no chills,  no constipation, no cough, no diarrhea, no dysuria, no fatigue, no fever, no flatus, no hematemesis, no hematochezia, no hematuria, no melena, no shortness of breath, no sore throat, no vaginal bleeding, no vaginal discharge and no vomiting    Risk factors: no alcohol abuse, no aspirin use, has not had multiple surgeries, no NSAID use, not obese, not pregnant and no recent hospitalization        Review of Systems   Constitutional: Negative for chills, fatigue and fever.   HENT: Negative for sore throat.    Respiratory: Negative for cough and shortness of breath.    Cardiovascular: Negative for chest pain.   Gastrointestinal: Positive for abdominal pain and nausea. Negative for anorexia, constipation, diarrhea, flatus, hematemesis, hematochezia, melena and vomiting.   Genitourinary: Negative for dysuria, hematuria, vaginal bleeding and vaginal discharge.   All other systems reviewed and are negative.      Past Medical History:   Diagnosis Date   • Arthritis    • Depression    • Disease of thyroid gland    • Hypertension    • Injury of back        Allergies   Allergen Reactions   • Eggs Or Egg-Derived Products        Past Surgical History:   Procedure Laterality Date   • TONSILLECTOMY     • VASECTOMY         Family History   Problem Relation Age of Onset   • Arthritis Mother    • Hypertension Mother    • Aneurysm Father    • No Known Problems Sister    • No Known Problems Brother        Social History     Social History   • Marital status:      Spouse name: N/A   • Number of children: N/A   • Years of education: N/A     Social History Main Topics   • Smoking status: Never Smoker   • Smokeless tobacco: Current User     Types: Snuff   • Alcohol use Yes      Comment: occassional   • Drug use: No   • Sexual activity: Defer     Other Topics Concern   • None     Social History Narrative           Objective   Physical Exam   Constitutional: He is oriented to person, place, and time. He appears well-developed and  well-nourished.   HENT:   Head: Normocephalic and atraumatic.   Eyes: Conjunctivae are normal. Pupils are equal, round, and reactive to light.   Neck: Normal range of motion.   Cardiovascular: Normal rate, regular rhythm and normal heart sounds.    Pulmonary/Chest: Effort normal and breath sounds normal.   Abdominal: Soft. Bowel sounds are normal. There is tenderness in the left lower quadrant. There is CVA tenderness.   Musculoskeletal: Normal range of motion.   Neurological: He is alert and oriented to person, place, and time.   Skin: Skin is warm and dry.   Psychiatric: He has a normal mood and affect.   Nursing note and vitals reviewed.      Procedures        ED Course  ED Course   Comment By Time   Pt CT scan reviewed with Dr. Zhong at this time. Call placed to urology to discuss findings. Rosanne Gasca, APRN 10/03 1605   Spoke with Dr. Rinaldi; advised of the renal infarction; he has no further recommendations for urology standpoint and has advised that we call vascular to discuss this. Rosanne Gasca, APRN 10/03 1631   Call and spoke to ELAINA Pearson with Dr. Ames; Dr. Ames reviewed the pt CT scan; recommends intial coagulation with Lovenox 1mg/kg SQ and admission to hospitalist to determine why the pt has the renal infarction. Rosanne Gasca, APRN 10/03 1631   I discussed the findings with the patient and his wife.Patient denies any black tarry stools.  Denies any bloody stools or vomitting of blood. He denies any recent head injuries. He denies any previous history of Coagulation disorders.  I will go ahead and give the Lovenox.  I have placed a call to the hospitalist to discuss admission. Rosanne Gasca, APRN 10/03 1640   Spoke with Dr. Cross; the pt will be admitted in stable cond. Rosanne Gasca, APRN 10/03 1643   The pt denies any LE pain, chest pain or shortness of breath. Rosanne Gasca, APRN 10/03 1647      CT Abdomen Pelvis With Contrast   Final Result          Lab Results  (last 24 hours)     Procedure Component Value Units Date/Time    CBC & Differential [[id number]] Collected:  10/03/17 1418    Specimen:  Blood Updated:  10/03/17 1447    Narrative:       The following orders were created for panel order CBC & Differential.  Procedure                               Abnormality         Status                     ---------                               -----------         ------                     CBC Auto Differential[[id number]]        Abnormal            Final result                 Please view results for these tests on the individual orders.    Comprehensive Metabolic Panel [[id number]]  (Abnormal) Collected:  10/03/17 1418    Specimen:  Blood Updated:  10/03/17 1454     Glucose 99 mg/dL      BUN 16 mg/dL      Creatinine 0.90 mg/dL      Sodium 142 mmol/L      Potassium 3.9 mmol/L      Chloride 107 mmol/L      CO2 21.0 (L) mmol/L      Calcium 9.3 mg/dL      Total Protein 7.6 g/dL      Albumin 4.50 g/dL      ALT (SGPT) 51 U/L      AST (SGOT) 31 U/L      Alkaline Phosphatase 70 U/L      Total Bilirubin 0.7 mg/dL      eGFR Non African Amer 92 mL/min/1.73      Globulin 3.1 gm/dL      A/G Ratio 1.5 g/dL      BUN/Creatinine Ratio 17.8     Anion Gap 14.0 (H) mmol/L     Lipase [[id number]]  (Abnormal) Collected:  10/03/17 1418    Specimen:  Blood Updated:  10/03/17 1454     Lipase 18 (L) U/L     Amylase [[id number]]  (Normal) Collected:  10/03/17 1418    Specimen:  Blood Updated:  10/03/17 1454     Amylase 35 U/L     Lactic Acid, Plasma [[id number]]  (Normal) Collected:  10/03/17 1418    Specimen:  Blood Updated:  10/03/17 1451     Lactate 1.1 mmol/L     C-reactive Protein [[id number]]  (Normal) Collected:  10/03/17 1418    Specimen:  Blood Updated:  10/03/17 1454     C-Reactive Protein 0.74 mg/dL     CBC Auto Differential [[id number]]  (Abnormal) Collected:  10/03/17 1418    Specimen:  Blood Updated:  10/03/17 1447     WBC 9.72 10*3/mm3      RBC 4.71 (L) 10*6/mm3      Hemoglobin 13.9 (L) g/dL       Hematocrit 41.6 %      MCV 88.3 fL      MCH 29.5 pg      MCHC 33.4 g/dL      RDW 14.1 %      RDW-SD 45.1 fl      MPV 9.8 fL      Platelets 253 10*3/mm3      Neutrophil % 75.0 %      Lymphocyte % 17.4 %      Monocyte % 6.8 %      Eosinophil % 0.5 %      Basophil % 0.3 %      Neutrophils, Absolute 7.29 10*3/mm3      Lymphocytes, Absolute 1.69 10*3/mm3      Monocytes, Absolute 0.66 10*3/mm3      Eosinophils, Absolute 0.05 10*3/mm3      Basophils, Absolute 0.03 10*3/mm3     Urinalysis With / Culture If Indicated - Urine, Clean Catch [[id number]]  (Abnormal) Collected:  10/03/17 1516    Specimen:  Urine from Urine, Clean Catch Updated:  10/03/17 1527     Color, UA Yellow     Appearance, UA Clear     pH, UA <=5.0     Specific Gravity, UA 1.009     Glucose, UA Negative     Ketones, UA Trace (A)     Bilirubin, UA Negative     Blood, UA Negative     Protein, UA Negative     Leuk Esterase, UA Negative     Nitrite, UA Negative     Urobilinogen, UA 0.2 E.U./dL    Narrative:       Urine microscopic not indicated.    Protime-INR [[id number]]  (Normal) Collected:  10/03/17 1617    Specimen:  Blood Updated:  10/03/17 1641     Protime 13.8 Seconds      INR 1.03    aPTT [[id number]]  (Normal) Collected:  10/03/17 1617    Specimen:  Blood Updated:  10/03/17 1641     PTT 24.1 seconds                 MDM  Number of Diagnoses or Management Options  Renal infarction: new and requires workup     Amount and/or Complexity of Data Reviewed  Clinical lab tests: ordered and reviewed  Tests in the radiology section of CPT®:  ordered and reviewed  Tests in the medicine section of CPT®:  ordered and reviewed  Decide to obtain previous medical records or to obtain history from someone other than the patient: yes  Discuss the patient with other providers: yes    Patient Progress  Patient progress: stable      Final diagnoses:   Renal infarction            WIL Whiting  10/03/17 1648       Electronically signed by WIL Whiting at  10/3/2017  4:48 PM        Hospital Medications (all)       Dose Frequency Start End    aluminum-magnesium hydroxide-simethicone (MAALOX MAX) 400-400-40 MG/5ML suspension 15 mL 15 mL Every 6 Hours PRN 10/3/2017     Sig - Route: Take 15 mL by mouth Every 6 (Six) Hours As Needed for Heartburn. - Oral    bisacodyl (DULCOLAX) EC tablet 5 mg 5 mg Daily PRN 10/3/2017     Sig - Route: Take 1 tablet by mouth Daily As Needed for Constipation. - Oral    diazePAM (VALIUM) tablet 5 mg 5 mg Every 6 Hours PRN 10/3/2017     Sig - Route: Take 1 tablet by mouth Every 6 (Six) Hours As Needed for Anxiety. - Oral    enoxaparin (LOVENOX) injection 130 mg 1 mg/kg × 132 kg Once 10/3/2017 10/3/2017    Sig - Route: Inject 0.87 mL under the skin 1 (One) Time. - Subcutaneous    Cosign for Ordering: Accepted by Kunal Cross DO on 10/3/2017  4:59 PM    enoxaparin (LOVENOX) injection 140 mg 1 mg/kg × 136 kg Every 12 Hours 10/4/2017     Sig - Route: Inject 0.93 mL under the skin Every 12 (Twelve) Hours. - Subcutaneous    escitalopram (LEXAPRO) tablet 20 mg 20 mg Daily 10/3/2017     Sig - Route: Take 2 tablets by mouth Daily. - Oral    HYDROcodone-acetaminophen (NORCO) 7.5-325 MG per tablet 1 tablet 1 tablet Every 4 Hours PRN 10/3/2017 10/13/2017    Sig - Route: Take 1 tablet by mouth Every 4 (Four) Hours As Needed for Moderate Pain . - Oral    HYDROmorphone (DILAUDID) injection 0.5 mg 0.5 mg Once 10/3/2017 10/3/2017    Sig - Route: Infuse 0.5 mL into a venous catheter 1 (One) Time. - Intravenous    Cosign for Ordering: Required by José Zhong MD    HYDROmorphone (DILAUDID) injection 1 mg 1 mg Once 10/3/2017 10/3/2017    Sig - Route: Infuse 1 mL into a venous catheter 1 (One) Time. - Intravenous    Cosign for Ordering: Accepted by Kathy Whiteside DO on 10/4/2017 11:40 AM    iopamidol (ISOVUE-300) 61 % injection 150 mL 150 mL Once in Imaging 10/3/2017 10/3/2017    Sig - Route: Infuse 150 mL into a venous catheter Once. - Intravenous     levothyroxine (SYNTHROID, LEVOTHROID) tablet 50 mcg 50 mcg Daily 10/3/2017     Sig - Route: Take 1 tablet by mouth Daily. - Oral    lisinopril (PRINIVIL,ZESTRIL) tablet 10 mg 10 mg Daily 10/3/2017     Sig - Route: Take 1 tablet by mouth Daily. - Oral    LORazepam (ATIVAN) tablet 1 mg 1 mg Every 6 Hours PRN 10/3/2017 10/13/2017    Sig - Route: Take 1 tablet by mouth Every 6 (Six) Hours As Needed for Anxiety. - Oral    morphine sulfate PCA 1 mg/mL 50 mL syringe  Continuous 10/3/2017 10/17/2017    Sig - Route: Infuse  into a venous catheter Continuous. - Intravenous    naloxone (NARCAN) injection 0.1 mg 0.1 mg Every 5 Minutes PRN 10/3/2017     Sig - Route: Infuse 0.25 mL into a venous catheter Every 5 (Five) Minutes As Needed for Opioid Reversal or Respiratory Depression (see administration instructions). - Intravenous    ondansetron (ZOFRAN) injection 4 mg 4 mg Once 10/3/2017 10/3/2017    Sig - Route: Infuse 2 mL into a venous catheter 1 (One) Time. - Intravenous    Cosign for Ordering: Accepted by Kathy Whiteside DO on 10/4/2017 11:40 AM    ondansetron (ZOFRAN) injection 4 mg 4 mg Every 6 Hours PRN 10/3/2017     Sig - Route: Infuse 2 mL into a venous catheter Every 6 (Six) Hours As Needed for Nausea or Vomiting. - Intravenous    oxyCODONE-acetaminophen (PERCOCET) 7.5-325 MG per tablet 2 tablet 2 tablet Every 4 Hours PRN 10/3/2017 10/13/2017    Sig - Route: Take 2 tablets by mouth Every 4 (Four) Hours As Needed for Severe Pain . - Oral    Pharmacy to Dose enoxaparin (LOVENOX)  Continuous PRN 10/3/2017     Sig - Route: Continuous As Needed for Consult. - Does not apply    sodium chloride 0.9 % bolus 1,000 mL 1,000 mL Once 10/3/2017 10/3/2017    Sig - Route: Infuse 1,000 mL into a venous catheter 1 (One) Time. - Intravenous    Cosign for Ordering: Accepted by Kathy Whiteside DO on 10/4/2017 11:40 AM    sodium chloride 0.9 % flush 1-10 mL 1-10 mL As Needed 10/3/2017     Sig - Route: Infuse 1-10 mL into a venous  "catheter As Needed for Line Care. - Intravenous    sodium chloride 0.9 % flush 10 mL 10 mL As Needed 10/3/2017     Sig - Route: Infuse 10 mL into a venous catheter As Needed for Line Care. - Intravenous    Cosign for Ordering: Accepted by Kathy Whiteside DO on 10/4/2017 11:40 AM    Linked Group 1:  \"And\" Linked Group Details        tiZANidine (ZANAFLEX) tablet 4 mg 4 mg Every 8 Hours PRN 10/3/2017     Sig - Route: Take 1 tablet by mouth Every 8 (Eight) Hours As Needed for Muscle Spasms. - Oral    topiramate (TOPAMAX) tablet 50 mg 50 mg Daily 10/3/2017     Sig - Route: Take 2 tablets by mouth Daily. - Oral    Morphine sulfate (PF) injection 2 mg (Discontinued) 2 mg Every 3 Hours PRN 10/3/2017 10/3/2017    Sig - Route: Infuse 1 mL into a venous catheter Every 3 (Three) Hours As Needed for Moderate Pain  or Severe Pain . - Intravenous    Morphine sulfate (PF) injection 2 mg (Discontinued) 2 mg Every 1 Hour PRN 10/3/2017 10/3/2017    Sig - Route: Infuse 1 mL into a venous catheter Every 1 (One) Hour As Needed for Moderate Pain  or Severe Pain . - Intravenous    Morphine sulfate (PF) injection 4 mg (Discontinued) 4 mg Every 3 Hours PRN 10/3/2017 10/3/2017    Sig - Route: Infuse 1 mL into a venous catheter Every 3 (Three) Hours As Needed for Moderate Pain  or Severe Pain . - Intravenous    morphine sulfate PCA 1 mg/mL 50 mL syringe (Discontinued)  Continuous 10/3/2017 10/3/2017    Sig - Route: Infuse  into a venous catheter Continuous. - Intravenous    morphine sulfate PCA 1 mg/mL 50 mL syringe (Discontinued)  Continuous 10/3/2017 10/3/2017    Sig - Route: Infuse  into a venous catheter Continuous. - Intravenous          Orders (last 72 hrs)     Start     Ordered    10/04/17 0600  enoxaparin (LOVENOX) injection 140 mg  Every 12 Hours      10/03/17 1936    10/04/17 0600  CBC Auto Differential  PROCEDURE ONCE      10/04/17 0001    10/03/17 2315  morphine sulfate PCA 1 mg/mL 50 mL syringe  Continuous,   Status:  " Discontinued      10/03/17 2230    10/03/17 2315  morphine sulfate PCA 1 mg/mL 50 mL syringe  Continuous      10/03/17 2243    10/03/17 2000  Vital Signs  Every 4 Hours      10/03/17 1819    10/03/17 2000  Clear & Record PCA Pump Settings  Every 4 Hours      10/03/17 1830    10/03/17 1915  morphine sulfate PCA 1 mg/mL 50 mL syringe  Continuous,   Status:  Discontinued      10/03/17 1830    10/03/17 1900  Strict Intake and Output  Every Hour      10/03/17 1819    10/03/17 1900  escitalopram (LEXAPRO) tablet 20 mg  Daily      10/03/17 1827    10/03/17 1900  levothyroxine (SYNTHROID, LEVOTHROID) tablet 50 mcg  Daily      10/03/17 1827    10/03/17 1900  lisinopril (PRINIVIL,ZESTRIL) tablet 10 mg  Daily      10/03/17 1827    10/03/17 1900  topiramate (TOPAMAX) tablet 50 mg  Daily      10/03/17 1827    10/03/17 1830  Morphine sulfate (PF) injection 2 mg  Every 1 Hour PRN,   Status:  Discontinued      10/03/17 1824    10/03/17 1829  Assess Respiratory Rate, Pain Score & Sedation Level Using Pasero Opioid-Sedation Scale (POSS)  Per Hospital Policy     Comments:  Assess every 2 hours x 24 hours, then every 4 hours and PRN while receiving PCA.    10/03/17 1830    10/03/17 1829  Notify Provider for Inadequate Pain Control, Excessive Sedation or Other Issues Regarding PCA Therapy  Until Discontinued      10/03/17 1830    10/03/17 1828  naloxone (NARCAN) injection 0.1 mg  Every 5 Minutes PRN      10/03/17 1830    10/03/17 1828  Diet Regular; Thin  Diet Effective Now      10/03/17 1827    10/03/17 1827  tiZANidine (ZANAFLEX) tablet 4 mg  Every 8 Hours PRN      10/03/17 1827    10/03/17 1827  diazePAM (VALIUM) tablet 5 mg  Every 6 Hours PRN      10/03/17 1827    10/03/17 1820  Weigh Patient  Once      10/03/17 1819    10/03/17 1820  Oxygen Therapy-  Continuous      10/03/17 1819    10/03/17 1820  Insert Peripheral IV  Once      10/03/17 1819    10/03/17 1820  Saline Lock & Maintain IV Access  Continuous      10/03/17 1819     10/03/17 1820  Full Code  Continuous      10/03/17 1819    10/03/17 1820  VTE Risk Assessment - Moderate Risk  Once      10/03/17 1819    10/03/17 1820  Pharmacologic VTE Prophylaxis Not Indicated: Already Ordered in This Admission  Once      10/03/17 1819    10/03/17 1820  Place Sequential Compression Device  Once      10/03/17 1819    10/03/17 1820  Maintain Sequential Compression Device  Continuous      10/03/17 1819    10/03/17 1820  Activity - Ad Mary  Until Discontinued      10/03/17 1819    10/03/17 1820  Basic Metabolic Panel  Daily      10/03/17 1819    10/03/17 1820  CBC & Differential  Daily      10/03/17 1819    10/03/17 1820  Protime-INR  Once      10/03/17 1819    10/03/17 1820  aPTT  Once      10/03/17 1819    10/03/17 1820  Inpatient Consult to Vascular Surgery  Once     Specialty:  Vascular Surgery  Provider:  Kristopher Ames DO    10/03/17 1819    10/03/17 1820  Antithrombin III  STAT      10/03/17 1819    10/03/17 1820  Factor 5 Leiden  STAT      10/03/17 1819    10/03/17 1820  Homocysteine  STAT      10/03/17 1819    10/03/17 1820  Lupus Anticoagulant  STAT      10/03/17 1819    10/03/17 1820  Protein C Activity  STAT      10/03/17 1819    10/03/17 1820  Protein S Functional  STAT      10/03/17 1819    10/03/17 1820  Antiphosphotidyl Antibodies Panel II  STAT      10/03/17 1819    10/03/17 1820  Anticardiolipin Antibody, IgG / M, Qn  STAT      10/03/17 1819    10/03/17 1820  Protein C Antigen, Total  STAT      10/03/17 1819    10/03/17 1820  Protein S Antigen, Total  STAT      10/03/17 1819    10/03/17 1820  Factor II, DNA Analysis  STAT      10/03/17 1819    10/03/17 1820  Factor 5 Activity  STAT      10/03/17 1819    10/03/17 1820  Adult Transthoracic Echo Complete W/ Cont if Necessary Per Protocol  Once     Comments:  With bubble study    10/03/17 1819    10/03/17 1820  CBC Auto Differential  PROCEDURE ONCE      10/03/17 1819    10/03/17 1819  sodium chloride 0.9 % flush 1-10 mL  As Needed       10/03/17 1819    10/03/17 1819  HYDROcodone-acetaminophen (NORCO) 7.5-325 MG per tablet 1 tablet  Every 4 Hours PRN      10/03/17 1819    10/03/17 1819  oxyCODONE-acetaminophen (PERCOCET) 7.5-325 MG per tablet 2 tablet  Every 4 Hours PRN      10/03/17 1819    10/03/17 1819  LORazepam (ATIVAN) tablet 1 mg  Every 6 Hours PRN      10/03/17 1819    10/03/17 1819  bisacodyl (DULCOLAX) EC tablet 5 mg  Daily PRN      10/03/17 1819    10/03/17 1819  ondansetron (ZOFRAN) injection 4 mg  Every 6 Hours PRN      10/03/17 1819    10/03/17 1819  aluminum-magnesium hydroxide-simethicone (MAALOX MAX) 400-400-40 MG/5ML suspension 15 mL  Every 6 Hours PRN      10/03/17 1819    10/03/17 1819  Pharmacy to Dose enoxaparin (LOVENOX)  Continuous PRN      10/03/17 1819    10/03/17 1722  Morphine sulfate (PF) injection 2 mg  Every 3 Hours PRN,   Status:  Discontinued      10/03/17 1722    10/03/17 1722  Morphine sulfate (PF) injection 4 mg  Every 3 Hours PRN,   Status:  Discontinued      10/03/17 1722    10/03/17 1647  enoxaparin (LOVENOX) injection 130 mg  Once      10/03/17 1645    10/03/17 1644  Inpatient Admission  Once      10/03/17 1645    10/03/17 1610  HYDROmorphone (DILAUDID) injection 0.5 mg  Once      10/03/17 1608    10/03/17 1604  Protime-INR  Once      10/03/17 1604    10/03/17 1604  aPTT  Once      10/03/17 1604    10/03/17 1532  iopamidol (ISOVUE-300) 61 % injection 150 mL  Once in Imaging      10/03/17 1530    10/03/17 1427  CT Abdomen Pelvis With Contrast  1 Time Imaging     Comments:  IV CONTRAST ONLY      10/03/17 1426    10/03/17 1410  sodium chloride 0.9 % bolus 1,000 mL  Once      10/03/17 1408    10/03/17 1410  HYDROmorphone (DILAUDID) injection 1 mg  Once      10/03/17 1408    10/03/17 1410  ondansetron (ZOFRAN) injection 4 mg  Once      10/03/17 1408    10/03/17 1408  Amylase  STAT      10/03/17 1408    10/03/17 1408  Urinalysis With / Culture If Indicated - Urine, Clean Catch  Once      10/03/17 1408     10/03/17 1408  Lactic Acid, Plasma  Once      10/03/17 1408    10/03/17 1408  C-reactive Protein  Once      10/03/17 1408    10/03/17 1408  ECG 12 Lead  Once      10/03/17 1408    10/03/17 1408  Cardiac Monitoring  Once      10/03/17 1408    10/03/17 1408  Pulse Oximetry, Continuous  Per Hospital Policy      10/03/17 1408    10/03/17 1408  NPO Diet  Diet Effective Now,   Status:  Canceled      10/03/17 1408    10/03/17 1408  Insert peripheral IV  Once      10/03/17 1408    10/03/17 1407  sodium chloride 0.9 % flush 10 mL  As Needed      10/03/17 1408    10/03/17 1407  CBC & Differential  Once      10/03/17 1408    10/03/17 1407  Comprehensive Metabolic Panel  Once      10/03/17 1408    10/03/17 1407  Lipase  Once      10/03/17 1408    10/03/17 1407  CBC Auto Differential  PROCEDURE ONCE      10/03/17 1408          Physician Progress Notes (last 72 hours) (Notes from 10/1/2017  1:45 PM through 10/4/2017  1:45 PM)     No notes of this type exist for this encounter.           Consult Notes (last 72 hours) (Notes from 10/1/2017  1:45 PM through 10/4/2017  1:45 PM)      WIL Grover at 10/4/2017 10:03 AM  Version 2 of 2         Alin Richard  [id number]  [id number]  487/1  Kunal Cross DO  10/3/2017      HPI: Alin Richard is a 42 y.o. male who presented to ER with complaint of flank pain and abdominal pain.  He initially reported some left-sided low back pain radiating to his left lower quadrant and reports that he had similar symptoms in July which was determined to be due to bulging disks.  He did have some associated nausea but denied any vomiting constipation or diarrhea.  He is following with neurosurgery for this bulging discs later this month.  I did speak with Rosanne DE LA TORRE regarding CTA findings.  His CTA of abdomen and pelvis showed a focal left kidney infarction.  He has no history of atrial fibrillation or clotting disorder.  He does report that his mother has history of blood  disorders and anticoagulated, but unsure of diagnosis.  His pain is better, but still present.    Past Medical History:   Diagnosis Date   • Arthritis    • Depression    • Disease of thyroid gland    • Hypertension    • Injury of back        Past Surgical History:   Procedure Laterality Date   • TONSILLECTOMY     • VASECTOMY         Family History   Problem Relation Age of Onset   • Arthritis Mother    • Hypertension Mother    • Clotting disorder Mother    • Aneurysm Father    • No Known Problems Sister    • No Known Problems Brother        Social History     Social History   • Marital status:      Spouse name: N/A   • Number of children: N/A   • Years of education: N/A     Occupational History   • Not on file.     Social History Main Topics   • Smoking status: Never Smoker   • Smokeless tobacco: Current User     Types: Snuff   • Alcohol use Yes      Comment: occassional   • Drug use: No   • Sexual activity: Yes     Partners: Female     Other Topics Concern   • Not on file     Social History Narrative   • No narrative on file       Allergies   Allergen Reactions   • Eggs Or Egg-Derived Products        Hospital Medications (active)       Dose Frequency Start End    aluminum-magnesium hydroxide-simethicone (MAALOX MAX) 400-400-40 MG/5ML suspension 15 mL 15 mL Every 6 Hours PRN 10/3/2017     Sig - Route: Take 15 mL by mouth Every 6 (Six) Hours As Needed for Heartburn. - Oral    bisacodyl (DULCOLAX) EC tablet 5 mg 5 mg Daily PRN 10/3/2017     Sig - Route: Take 1 tablet by mouth Daily As Needed for Constipation. - Oral    diazePAM (VALIUM) tablet 5 mg 5 mg Every 6 Hours PRN 10/3/2017     Sig - Route: Take 1 tablet by mouth Every 6 (Six) Hours As Needed for Anxiety. - Oral    enoxaparin (LOVENOX) injection 130 mg 1 mg/kg × 132 kg Once 10/3/2017 10/3/2017    Sig - Route: Inject 0.87 mL under the skin 1 (One) Time. - Subcutaneous    Cosign for Ordering: Accepted by Kunal Cross DO on 10/3/2017  4:59 PM     enoxaparin (LOVENOX) injection 140 mg 1 mg/kg × 136 kg Every 12 Hours 10/4/2017     Sig - Route: Inject 0.93 mL under the skin Every 12 (Twelve) Hours. - Subcutaneous    escitalopram (LEXAPRO) tablet 20 mg 20 mg Daily 10/3/2017     Sig - Route: Take 2 tablets by mouth Daily. - Oral    HYDROcodone-acetaminophen (NORCO) 7.5-325 MG per tablet 1 tablet 1 tablet Every 4 Hours PRN 10/3/2017 10/13/2017    Sig - Route: Take 1 tablet by mouth Every 4 (Four) Hours As Needed for Moderate Pain . - Oral    HYDROmorphone (DILAUDID) injection 0.5 mg 0.5 mg Once 10/3/2017 10/3/2017    Sig - Route: Infuse 0.5 mL into a venous catheter 1 (One) Time. - Intravenous    Cosign for Ordering: Required by José Zhong MD    HYDROmorphone (DILAUDID) injection 1 mg 1 mg Once 10/3/2017 10/3/2017    Sig - Route: Infuse 1 mL into a venous catheter 1 (One) Time. - Intravenous    Cosign for Ordering: Required by Kathy Whiteside DO    iopamidol (ISOVUE-300) 61 % injection 150 mL 150 mL Once in Imaging 10/3/2017 10/3/2017    Sig - Route: Infuse 150 mL into a venous catheter Once. - Intravenous    levothyroxine (SYNTHROID, LEVOTHROID) tablet 50 mcg 50 mcg Daily 10/3/2017     Sig - Route: Take 1 tablet by mouth Daily. - Oral    lisinopril (PRINIVIL,ZESTRIL) tablet 10 mg 10 mg Daily 10/3/2017     Sig - Route: Take 1 tablet by mouth Daily. - Oral    LORazepam (ATIVAN) tablet 1 mg 1 mg Every 6 Hours PRN 10/3/2017 10/13/2017    Sig - Route: Take 1 tablet by mouth Every 6 (Six) Hours As Needed for Anxiety. - Oral    morphine sulfate PCA 1 mg/mL 50 mL syringe  Continuous 10/3/2017 10/17/2017    Sig - Route: Infuse  into a venous catheter Continuous. - Intravenous    naloxone (NARCAN) injection 0.1 mg 0.1 mg Every 5 Minutes PRN 10/3/2017     Sig - Route: Infuse 0.25 mL into a venous catheter Every 5 (Five) Minutes As Needed for Opioid Reversal or Respiratory Depression (see administration instructions). - Intravenous    ondansetron (ZOFRAN) injection 4  "mg 4 mg Once 10/3/2017 10/3/2017    Sig - Route: Infuse 2 mL into a venous catheter 1 (One) Time. - Intravenous    Cosign for Ordering: Required by Kathy Whiteside DO    ondansetron (ZOFRAN) injection 4 mg 4 mg Every 6 Hours PRN 10/3/2017     Sig - Route: Infuse 2 mL into a venous catheter Every 6 (Six) Hours As Needed for Nausea or Vomiting. - Intravenous    oxyCODONE-acetaminophen (PERCOCET) 7.5-325 MG per tablet 2 tablet 2 tablet Every 4 Hours PRN 10/3/2017 10/13/2017    Sig - Route: Take 2 tablets by mouth Every 4 (Four) Hours As Needed for Severe Pain . - Oral    Pharmacy to Dose enoxaparin (LOVENOX)  Continuous PRN 10/3/2017     Sig - Route: Continuous As Needed for Consult. - Does not apply    sodium chloride 0.9 % bolus 1,000 mL 1,000 mL Once 10/3/2017 10/3/2017    Sig - Route: Infuse 1,000 mL into a venous catheter 1 (One) Time. - Intravenous    Cosign for Ordering: Required by Kathy Whiteside DO    sodium chloride 0.9 % flush 1-10 mL 1-10 mL As Needed 10/3/2017     Sig - Route: Infuse 1-10 mL into a venous catheter As Needed for Line Care. - Intravenous    sodium chloride 0.9 % flush 10 mL 10 mL As Needed 10/3/2017     Sig - Route: Infuse 10 mL into a venous catheter As Needed for Line Care. - Intravenous    Cosign for Ordering: Required by Kathy Whiteside DO    Linked Group 1:  \"And\" Linked Group Details        tiZANidine (ZANAFLEX) tablet 4 mg 4 mg Every 8 Hours PRN 10/3/2017     Sig - Route: Take 1 tablet by mouth Every 8 (Eight) Hours As Needed for Muscle Spasms. - Oral    topiramate (TOPAMAX) tablet 50 mg 50 mg Daily 10/3/2017     Sig - Route: Take 2 tablets by mouth Daily. - Oral    Morphine sulfate (PF) injection 2 mg (Discontinued) 2 mg Every 3 Hours PRN 10/3/2017 10/3/2017    Sig - Route: Infuse 1 mL into a venous catheter Every 3 (Three) Hours As Needed for Moderate Pain  or Severe Pain . - Intravenous    Morphine sulfate (PF) injection 2 mg (Discontinued) 2 mg Every 1 Hour PRN 10/3/2017 " 10/3/2017    Sig - Route: Infuse 1 mL into a venous catheter Every 1 (One) Hour As Needed for Moderate Pain  or Severe Pain . - Intravenous    Morphine sulfate (PF) injection 4 mg (Discontinued) 4 mg Every 3 Hours PRN 10/3/2017 10/3/2017    Sig - Route: Infuse 1 mL into a venous catheter Every 3 (Three) Hours As Needed for Moderate Pain  or Severe Pain . - Intravenous    morphine sulfate PCA 1 mg/mL 50 mL syringe (Discontinued)  Continuous 10/3/2017 10/3/2017    Sig - Route: Infuse  into a venous catheter Continuous. - Intravenous    morphine sulfate PCA 1 mg/mL 50 mL syringe (Discontinued)  Continuous 10/3/2017 10/3/2017    Sig - Route: Infuse  into a venous catheter Continuous. - Intravenous          Review of Systems   Constitutional: Negative.    HENT: Negative.    Eyes: Negative.    Respiratory: Negative.    Cardiovascular: Negative.    Gastrointestinal: Positive for abdominal pain (left flank pain) and nausea.   Endocrine: Negative.    Genitourinary: Negative.    Musculoskeletal: Positive for arthralgias and back pain.   Skin: Negative.    Allergic/Immunologic: Negative.    Neurological: Negative.    Hematological: Negative.    Psychiatric/Behavioral: Negative.    All other systems reviewed and are negative.      Physical Exam   Constitutional: He is oriented to person, place, and time. He appears well-developed and well-nourished.   HENT:   Head: Normocephalic and atraumatic.   Eyes: Pupils are equal, round, and reactive to light. No scleral icterus.   Neck: Normal range of motion. Neck supple. No thyromegaly present.   Cardiovascular: Normal rate, regular rhythm, normal heart sounds and intact distal pulses.    Pulmonary/Chest: Effort normal and breath sounds normal.   Abdominal: Soft. Bowel sounds are normal.   Left flank pain   Musculoskeletal: Normal range of motion.   Neurological: He is alert and oriented to person, place, and time.   Skin: Skin is warm and dry.   Psychiatric: He has a normal mood and  affect. His behavior is normal. Judgment and thought content normal.   Nursing note and vitals reviewed.      Laboratory Data:    Results from last 7 days  Lab Units 10/04/17  0530 10/03/17  1842 10/03/17  1418   WBC 10*3/mm3 9.13 10.70 9.72   HEMOGLOBIN g/dL 13.0* 14.4 13.9*   HEMATOCRIT % 39.9* 43.4 41.6   PLATELETS 10*3/mm3 230 275 253         Results from last 7 days  Lab Units 10/04/17  0530 10/03/17  1842 10/03/17  1418   SODIUM mmol/L 143 143 142   POTASSIUM mmol/L 4.1 3.8 3.9   CHLORIDE mmol/L 107 105 107   CO2 mmol/L 24.0 24.0 21.0*   BUN mg/dL 11 13 16   CREATININE mg/dL 0.98 0.88 0.90   CALCIUM mg/dL 8.9 9.3 9.3   BILIRUBIN mg/dL  --   --  0.7   ALK PHOS U/L  --   --  70   ALT (SGPT) U/L  --   --  51   AST (SGOT) U/L  --   --  31   GLUCOSE mg/dL 107* 89 99       Results from last 7 days  Lab Units 10/03/17  1842 10/03/17  1617   INR  0.94 1.03   APTT seconds 24.9 24.1         Diagnostic Data:  Imaging Results (last 24 hours)     Procedure Component Value Units Date/Time    CT Abdomen Pelvis With Contrast [[id number]] Collected:  10/03/17 1541     Updated:  10/03/17 1554    Narrative:       EXAMINATION: CT ABDOMEN PELVIS W CONTRAST-      10/3/2017 1:59 PM CST     HISTORY: Left lower quadrant abdominal pain and left flank pain. Pain  radiates from back to front.     In order to have a CT radiation dose as low as reasonably achievable  Automated Exposure Control was utilized for adjustment of the mA and/or  KV according to patient size.     DLP in mGycm= 1150.     Discrete 4 x 2 cm nonenhancing parenchymal focus at the upper pole of  the left kidney compatible with focal renal infarction. There is no  atherosclerotic disease seen involving the upper aorta or renal arteries  and evaluation for embolic disease is recommended.     The only comparison study is a noncontrast CT from 05/24/17. An infarct  likely would not be seen on a noncontrast study. No renal abnormality  could be seen at that time.     Given  the patient's left-sided symptoms this finding must be considered  to be acute.     Normal heart size.  Clear lung bases.  A few small hepatic cysts are again seen.  Otherwise normal liver.  Normal gallbladder, pancreas, spleen, adrenal glands, and right.     No bowel dilation.  No free fluid.  No appendicitis or diverticulitis.     No pelvic mass or fluid.     Summary:  1. Focal infarction of the upper pole of the left kidney. There is no  evidence of atherosclerotic vascular disease and evaluation for embolic  disease is recommended.                                   This report was finalized on 10/03/2017 15:51 by Dr. Edgar Gómez MD.          Impression:  Principal Problem:    Renal infarction  Active Problems:    Hypertension    Herniated intervertebral disc of lumbar spine    Disease of thyroid gland      Plan: After thoroughly evaluating Alin Richard, I believe the best course of action is to remain conservative from a vascular standpoint.  Dr. Ames did review his CTA of the abdomen and pelvis closely, which shows his renal arteries to be widely patent.  There is no atherosclerosis noted in the upper aorta or renal arteries.  I did discuss with Rosanne DE LA TORRE in ER.  He will need workup, which appears to have been started, to find where he is embolizing from.  He does need to be anticoagulated with Lovenox initially as discussed with ER.  There is no intervention required from vascular surgery.  Thank you for allowing me to participate in the care of your patient.  Please do not hesitate to call with any questions or concerns.  This was all discussed in full with complete understanding.     WIL Grover            Electronically signed by WIL Grover at 10/4/2017 10:44 AM      WIL Grover at 10/4/2017 10:03 AM  Version 1 of 2         Alin Richard  [id number]  [id number]  487/1  Kunal Cross DO  10/3/2017      HPI: Alin Richard is a 42 y.o. male who  presented to ER with complaint of flank pain and abdominal pain.  He initially reported some left-sided low back pain radiating to his left lower quadrant and reports that he had similar symptoms in July which was determined to be due to bulging disks.  He did have some associated nausea but denied any vomiting constipation or diarrhea.  He is following with neurosurgery for this bulging discs later this month.  I did speak with Rosanne DE LA TORRE regarding CTA findings.  His CTA of abdomen and pelvis showed a focal left kidney infarction.  He has no history of atrial fibrillation.    Past Medical History:   Diagnosis Date   • Arthritis    • Depression    • Disease of thyroid gland    • Hypertension    • Injury of back        Past Surgical History:   Procedure Laterality Date   • TONSILLECTOMY     • VASECTOMY         Family History   Problem Relation Age of Onset   • Arthritis Mother    • Hypertension Mother    • Clotting disorder Mother    • Aneurysm Father    • No Known Problems Sister    • No Known Problems Brother        Social History     Social History   • Marital status:      Spouse name: N/A   • Number of children: N/A   • Years of education: N/A     Occupational History   • Not on file.     Social History Main Topics   • Smoking status: Never Smoker   • Smokeless tobacco: Current User     Types: Snuff   • Alcohol use Yes      Comment: occassional   • Drug use: No   • Sexual activity: Yes     Partners: Female     Other Topics Concern   • Not on file     Social History Narrative   • No narrative on file       Allergies   Allergen Reactions   • Eggs Or Egg-Derived Products        [facility] Medications (active)       Dose Frequency Start End    aluminum-magnesium hydroxide-simethicone (MAALOX MAX) 400-400-40 MG/5ML suspension 15 mL 15 mL Every 6 Hours PRN 10/3/2017     Sig - Route: Take 15 mL by mouth Every 6 (Six) Hours As Needed for Heartburn. - Oral    bisacodyl (DULCOLAX) EC tablet 5 mg 5 mg Daily  PRN 10/3/2017     Sig - Route: Take 1 tablet by mouth Daily As Needed for Constipation. - Oral    diazePAM (VALIUM) tablet 5 mg 5 mg Every 6 Hours PRN 10/3/2017     Sig - Route: Take 1 tablet by mouth Every 6 (Six) Hours As Needed for Anxiety. - Oral    enoxaparin (LOVENOX) injection 130 mg 1 mg/kg × 132 kg Once 10/3/2017 10/3/2017    Sig - Route: Inject 0.87 mL under the skin 1 (One) Time. - Subcutaneous    Cosign for Ordering: Accepted by Kunal Cross DO on 10/3/2017  4:59 PM    enoxaparin (LOVENOX) injection 140 mg 1 mg/kg × 136 kg Every 12 Hours 10/4/2017     Sig - Route: Inject 0.93 mL under the skin Every 12 (Twelve) Hours. - Subcutaneous    escitalopram (LEXAPRO) tablet 20 mg 20 mg Daily 10/3/2017     Sig - Route: Take 2 tablets by mouth Daily. - Oral    HYDROcodone-acetaminophen (NORCO) 7.5-325 MG per tablet 1 tablet 1 tablet Every 4 Hours PRN 10/3/2017 10/13/2017    Sig - Route: Take 1 tablet by mouth Every 4 (Four) Hours As Needed for Moderate Pain . - Oral    HYDROmorphone (DILAUDID) injection 0.5 mg 0.5 mg Once 10/3/2017 10/3/2017    Sig - Route: Infuse 0.5 mL into a venous catheter 1 (One) Time. - Intravenous    Cosign for Ordering: Required by José Zhong MD    HYDROmorphone (DILAUDID) injection 1 mg 1 mg Once 10/3/2017 10/3/2017    Sig - Route: Infuse 1 mL into a venous catheter 1 (One) Time. - Intravenous    Cosign for Ordering: Required by Kathy Whiteside DO    iopamidol (ISOVUE-300) 61 % injection 150 mL 150 mL Once in Imaging 10/3/2017 10/3/2017    Sig - Route: Infuse 150 mL into a venous catheter Once. - Intravenous    levothyroxine (SYNTHROID, LEVOTHROID) tablet 50 mcg 50 mcg Daily 10/3/2017     Sig - Route: Take 1 tablet by mouth Daily. - Oral    lisinopril (PRINIVIL,ZESTRIL) tablet 10 mg 10 mg Daily 10/3/2017     Sig - Route: Take 1 tablet by mouth Daily. - Oral    LORazepam (ATIVAN) tablet 1 mg 1 mg Every 6 Hours PRN 10/3/2017 10/13/2017    Sig - Route: Take 1 tablet by mouth  "Every 6 (Six) Hours As Needed for Anxiety. - Oral    morphine sulfate PCA 1 mg/mL 50 mL syringe  Continuous 10/3/2017 10/17/2017    Sig - Route: Infuse  into a venous catheter Continuous. - Intravenous    naloxone (NARCAN) injection 0.1 mg 0.1 mg Every 5 Minutes PRN 10/3/2017     Sig - Route: Infuse 0.25 mL into a venous catheter Every 5 (Five) Minutes As Needed for Opioid Reversal or Respiratory Depression (see administration instructions). - Intravenous    ondansetron (ZOFRAN) injection 4 mg 4 mg Once 10/3/2017 10/3/2017    Sig - Route: Infuse 2 mL into a venous catheter 1 (One) Time. - Intravenous    Cosign for Ordering: Required by Kathy Whiteside DO    ondansetron (ZOFRAN) injection 4 mg 4 mg Every 6 Hours PRN 10/3/2017     Sig - Route: Infuse 2 mL into a venous catheter Every 6 (Six) Hours As Needed for Nausea or Vomiting. - Intravenous    oxyCODONE-acetaminophen (PERCOCET) 7.5-325 MG per tablet 2 tablet 2 tablet Every 4 Hours PRN 10/3/2017 10/13/2017    Sig - Route: Take 2 tablets by mouth Every 4 (Four) Hours As Needed for Severe Pain . - Oral    Pharmacy to Dose enoxaparin (LOVENOX)  Continuous PRN 10/3/2017     Sig - Route: Continuous As Needed for Consult. - Does not apply    sodium chloride 0.9 % bolus 1,000 mL 1,000 mL Once 10/3/2017 10/3/2017    Sig - Route: Infuse 1,000 mL into a venous catheter 1 (One) Time. - Intravenous    Cosign for Ordering: Required by Kathy Whiteside DO    sodium chloride 0.9 % flush 1-10 mL 1-10 mL As Needed 10/3/2017     Sig - Route: Infuse 1-10 mL into a venous catheter As Needed for Line Care. - Intravenous    sodium chloride 0.9 % flush 10 mL 10 mL As Needed 10/3/2017     Sig - Route: Infuse 10 mL into a venous catheter As Needed for Line Care. - Intravenous    Cosign for Ordering: Required by Kathy Whiteside DO    Linked Group 1:  \"And\" Linked Group Details        tiZANidine (ZANAFLEX) tablet 4 mg 4 mg Every 8 Hours PRN 10/3/2017     Sig - Route: Take 1 tablet by " mouth Every 8 (Eight) Hours As Needed for Muscle Spasms. - Oral    topiramate (TOPAMAX) tablet 50 mg 50 mg Daily 10/3/2017     Sig - Route: Take 2 tablets by mouth Daily. - Oral    Morphine sulfate (PF) injection 2 mg (Discontinued) 2 mg Every 3 Hours PRN 10/3/2017 10/3/2017    Sig - Route: Infuse 1 mL into a venous catheter Every 3 (Three) Hours As Needed for Moderate Pain  or Severe Pain . - Intravenous    Morphine sulfate (PF) injection 2 mg (Discontinued) 2 mg Every 1 Hour PRN 10/3/2017 10/3/2017    Sig - Route: Infuse 1 mL into a venous catheter Every 1 (One) Hour As Needed for Moderate Pain  or Severe Pain . - Intravenous    Morphine sulfate (PF) injection 4 mg (Discontinued) 4 mg Every 3 Hours PRN 10/3/2017 10/3/2017    Sig - Route: Infuse 1 mL into a venous catheter Every 3 (Three) Hours As Needed for Moderate Pain  or Severe Pain . - Intravenous    morphine sulfate PCA 1 mg/mL 50 mL syringe (Discontinued)  Continuous 10/3/2017 10/3/2017    Sig - Route: Infuse  into a venous catheter Continuous. - Intravenous    morphine sulfate PCA 1 mg/mL 50 mL syringe (Discontinued)  Continuous 10/3/2017 10/3/2017    Sig - Route: Infuse  into a venous catheter Continuous. - Intravenous          Review of Systems   Constitutional: Negative.    HENT: Negative.    Eyes: Negative.    Respiratory: Negative.    Cardiovascular: Negative.    Gastrointestinal: Positive for abdominal pain (left flank pain) and nausea.   Endocrine: Negative.    Genitourinary: Negative.    Musculoskeletal: Positive for arthralgias and back pain.   Skin: Negative.    Allergic/Immunologic: Negative.    Neurological: Negative.    Hematological: Negative.    Psychiatric/Behavioral: Negative.    All other systems reviewed and are negative.      Physical Exam   Constitutional: He is oriented to person, place, and time. He appears well-developed and well-nourished.   HENT:   Head: Normocephalic and atraumatic.   Eyes: Pupils are equal, round, and reactive  to light. No scleral icterus.   Neck: Normal range of motion. Neck supple. No thyromegaly present.   Cardiovascular: Normal rate, regular rhythm, normal heart sounds and intact distal pulses.    Pulmonary/Chest: Effort normal and breath sounds normal.   Abdominal: Soft. Bowel sounds are normal.   Left flank pain   Musculoskeletal: Normal range of motion.   Neurological: He is alert and oriented to person, place, and time.   Skin: Skin is warm and dry.   Psychiatric: He has a normal mood and affect. His behavior is normal. Judgment and thought content normal.   Nursing note and vitals reviewed.      Laboratory Data:    Results from last 7 days  Lab Units 10/04/17  0530 10/03/17  1842 10/03/17  1418   WBC 10*3/mm3 9.13 10.70 9.72   HEMOGLOBIN g/dL 13.0* 14.4 13.9*   HEMATOCRIT % 39.9* 43.4 41.6   PLATELETS 10*3/mm3 230 275 253         Results from last 7 days  Lab Units 10/04/17  0530 10/03/17  1842 10/03/17  1418   SODIUM mmol/L 143 143 142   POTASSIUM mmol/L 4.1 3.8 3.9   CHLORIDE mmol/L 107 105 107   CO2 mmol/L 24.0 24.0 21.0*   BUN mg/dL 11 13 16   CREATININE mg/dL 0.98 0.88 0.90   CALCIUM mg/dL 8.9 9.3 9.3   BILIRUBIN mg/dL  --   --  0.7   ALK PHOS U/L  --   --  70   ALT (SGPT) U/L  --   --  51   AST (SGOT) U/L  --   --  31   GLUCOSE mg/dL 107* 89 99       Results from last 7 days  Lab Units 10/03/17  1842 10/03/17  1617   INR  0.94 1.03   APTT seconds 24.9 24.1         Diagnostic Data:  Imaging Results (last 24 hours)     Procedure Component Value Units Date/Time    CT Abdomen Pelvis With Contrast [[id number]] Collected:  10/03/17 1541     Updated:  10/03/17 1554    Narrative:       EXAMINATION: CT ABDOMEN PELVIS W CONTRAST-      10/3/2017 1:59 PM CST     HISTORY: Left lower quadrant abdominal pain and left flank pain. Pain  radiates from back to front.     In order to have a CT radiation dose as low as reasonably achievable  Automated Exposure Control was utilized for adjustment of the mA and/or  KV according to  patient size.     DLP in mGycm= 1150.     Discrete 4 x 2 cm nonenhancing parenchymal focus at the upper pole of  the left kidney compatible with focal renal infarction. There is no  atherosclerotic disease seen involving the upper aorta or renal arteries  and evaluation for embolic disease is recommended.     The only comparison study is a noncontrast CT from 05/24/17. An infarct  likely would not be seen on a noncontrast study. No renal abnormality  could be seen at that time.     Given the patient's left-sided symptoms this finding must be considered  to be acute.     Normal heart size.  Clear lung bases.  A few small hepatic cysts are again seen.  Otherwise normal liver.  Normal gallbladder, pancreas, spleen, adrenal glands, and right.     No bowel dilation.  No free fluid.  No appendicitis or diverticulitis.     No pelvic mass or fluid.     Summary:  1. Focal infarction of the upper pole of the left kidney. There is no  evidence of atherosclerotic vascular disease and evaluation for embolic  disease is recommended.                                   This report was finalized on 10/03/2017 15:51 by Dr. Edgar Gómez MD.          Impression:  Principal Problem:    Renal infarction  Active Problems:    Hypertension    Herniated intervertebral disc of lumbar spine    Disease of thyroid gland      Plan: After thoroughly evaluating Alin Richard, I believe the best course of action is to remain conservative from a vascular standpoint.  Dr. Ames did review his CTA of the abdomen and pelvis closely, which shows his renal arteries to be widely patent.  There is no atherosclerosis noted in the upper aorta or renal arteries.  I did discuss with Rosanne DE LA TORRE in ER.  He will need workup, which appears to have been started, to find where he is embolizing from.  He does need to be anticoagulated with Lovenox initially as discussed with ER.  There is no intervention required from vascular surgery.  Thank you for  allowing me to participate in the care of your patient.  Please do not hesitate to call with any questions or concerns.  This was all discussed in full with complete understanding.     WIL Grover     Electronically signed by WIL Grover at 10/4/2017 10:18 AM

## 2017-10-04 NOTE — CONSULTS
[facility] CONSULT NOTE    Referring Provider: Kunal Cross DO    Reason for Consultation: PFO Closure    Chief complaint:   Chief Complaint   Patient presents with   • Flank Pain       Subjective .     History of present illness:  Alin Richard is a 42 y.o. year old male who presented with acute flank pain. 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 Patient then had 2d echo with bubble study with showed EF 60% and interatrial septal aneurysm with evidence of PFO with moderate degree of shunting. Therefore we were asked to see if PFO closure would be indicated. Patient has a past medical history of hypertension and chronic back pain with a L4-L5 herniated disc for which he is following Dr. Castillo. He has undergone 6 weeks of PT and is due for an MRI later this month. Patient states initially he thought his pain was caused from his back, when he presented to the ER. Patient's mother also has a history of pulmonary embolism and DVT but there is a reported history of A-fib. Patient denies pain in his legs, states occasional swelling. Patient denies chest pain, shortness of breath. Patient has had significant change in exercise tolerance due to back pain. Patient does take weekly injections of testosterone. Denies tobacco use. Reports regular alcohol use.     History  Past Medical History:   Diagnosis Date   • Arthritis    • Depression    • Disease of thyroid gland    • Hypertension    • Injury of back    ,   Past Surgical History:   Procedure Laterality Date   • TONSILLECTOMY     • VASECTOMY     ,   Family History   Problem Relation Age of Onset   • Arthritis Mother    • Hypertension Mother    • Clotting disorder Mother    • Aneurysm Father    • No Known Problems Sister    • No Known Problems Brother    ,   Social History   Substance Use Topics   • Smoking status: Never Smoker    • Smokeless tobacco: Current User     Types: Snuff   • Alcohol use Yes      Comment: occassional   ,     Medications    Prior to Admission medications    Medication Sig Start Date End Date Taking? Authorizing Provider   diclofenac (VOLTAREN) 75 MG EC tablet Take 1 tablet by mouth 2 (Two) Times a Day. 7/18/17  Yes WIL Ross   Escitalopram Oxalate (LEXAPRO PO) Take 20 mg by mouth Daily.   Yes Historical Provider, MD   HYDROcodone-acetaminophen (NORCO)  MG per tablet Take 1 tablet by mouth Every 6 (Six) Hours As Needed for Moderate Pain (4-6). 5/24/17  Yes Shira Crawford MD   Levothyroxine Sodium (SYNTHROID PO) Take 50 mcg by mouth Daily.   Yes Historical Provider, MD   LISINOPRIL PO Take 10 mg by mouth Daily.   Yes Historical Provider, MD   TESTOSTERONE CYPIONATE IM Inject 100 mg into the shoulder, thigh, or buttocks 1 (One) Time Per Week.   Yes Historical Provider, MD   tiZANidine (ZANAFLEX) 4 MG tablet Take 1 tablet by mouth Every 8 (Eight) Hours As Needed for Muscle Spasms. 7/18/17  Yes WIL Ross   Topiramate (TOPAMAX PO) Take 50 mg by mouth Daily.   Yes Historical Provider, MD   diazePAM (VALIUM) 5 MG tablet Take 1 tablet by mouth Every 6 (Six) Hours As Needed for Anxiety. 5/24/17   Shira Crawford MD   Hydrocodone-Acetaminophen (NORCO PO) Take  by mouth.    Historical Provider, MD   MethylPREDNISolone (MEDROL, ERASTO,) 4 MG tablet Take as directed on package instructions. 7/18/17   WIL Ross       Current Facility-Administered Medications   Medication Dose Route Frequency Provider Last Rate Last Dose   • aluminum-magnesium hydroxide-simethicone (MAALOX MAX) 400-400-40 MG/5ML suspension 15 mL  15 mL Oral Q6H PRN Kunal Cross, DO       • bisacodyl (DULCOLAX) EC tablet 5 mg  5 mg Oral Daily PRN Kunal Cross, DO       • diazePAM (VALIUM) tablet 5 mg  5 mg Oral Q6H PRN Kunal Cross, DO       • enoxaparin (LOVENOX) injection 140 mg  1 mg/kg  Subcutaneous Q12H Kunal Cross, DO   140 mg at 10/04/17 0533   • escitalopram (LEXAPRO) tablet 20 mg  20 mg Oral Daily Kunal Cross, DO   20 mg at 10/04/17 0826   • HYDROcodone-acetaminophen (NORCO) 7.5-325 MG per tablet 1 tablet  1 tablet Oral Q4H PRN Kunal Cross, DO       • levothyroxine (SYNTHROID, LEVOTHROID) tablet 50 mcg  50 mcg Oral Daily Kunal Cross, DO   50 mcg at 10/04/17 0826   • lisinopril (PRINIVIL,ZESTRIL) tablet 10 mg  10 mg Oral Daily Kunal Cross, DO   10 mg at 10/04/17 0826   • LORazepam (ATIVAN) tablet 1 mg  1 mg Oral Q6H PRN Kunal Cross, DO       • morphine sulfate PCA 1 mg/mL 50 mL syringe   Intravenous Continuous Milton Valdez MD       • naloxone (NARCAN) injection 0.1 mg  0.1 mg Intravenous Q5 Min PRN Kunal Cross DO       • ondansetron (ZOFRAN) injection 4 mg  4 mg Intravenous Q6H PRN Kunal Cross DO       • oxyCODONE-acetaminophen (PERCOCET) 7.5-325 MG per tablet 2 tablet  2 tablet Oral Q4H PRN Kunal Cross DO   2 tablet at 10/04/17 1249   • Pharmacy to Dose enoxaparin (LOVENOX)   Does not apply Continuous PRN Kunal Cross DO       • sodium chloride 0.9 % flush 1-10 mL  1-10 mL Intravenous PRN Kunal Cross DO       • sodium chloride 0.9 % flush 10 mL  10 mL Intravenous PRN WIL Whiting       • tiZANidine (ZANAFLEX) tablet 4 mg  4 mg Oral Q8H PRN Kunal Cross, DO   4 mg at 10/03/17 2143   • topiramate (TOPAMAX) tablet 50 mg  50 mg Oral Daily Kunal Cross, DO   50 mg at 10/03/17 2054       Allergies:  Eggs or egg-derived products    Review of Systems  Review of Systems   Constitution: Negative for chills, decreased appetite, fever, malaise/fatigue, weight gain and weight loss.   HENT: Negative for nosebleeds.    Eyes: Negative for visual disturbance.   Cardiovascular: Negative for chest pain, dyspnea on exertion, leg swelling, near-syncope, orthopnea, palpitations, paroxysmal nocturnal dyspnea  "and syncope.   Respiratory: Negative for cough, hemoptysis, shortness of breath and snoring.    Endocrine: Negative for cold intolerance and heat intolerance.   Hematologic/Lymphatic: Negative for bleeding problem. Does not bruise/bleed easily.   Skin: Negative for rash.   Musculoskeletal: Negative for back pain and falls.   Gastrointestinal: Positive for abdominal pain (flank pain, controlled). Negative for constipation, diarrhea, heartburn, melena, nausea and vomiting.   Genitourinary: Negative for hematuria.   Neurological: Negative for dizziness, headaches and light-headedness.   Psychiatric/Behavioral: Negative for altered mental status.   Allergic/Immunologic: Negative for persistent infections.       Objective     Physical Exam:  Patient Vitals for the past 24 hrs:   BP Temp Temp src Pulse Resp SpO2 Height Weight   10/04/17 1116 - - - - - - 74\" (188 cm) 297 lb (135 kg)   10/04/17 0737 107/58 97.4 °F (36.3 °C) Oral 60 16 100 % - -   10/04/17 0411 102/50 97.5 °F (36.4 °C) Oral 60 16 100 % - -   10/03/17 2221 114/67 97.4 °F (36.3 °C) Oral 64 16 98 % - -   10/03/17 2000 137/82 98.2 °F (36.8 °C) Oral 70 18 100 % - 297 lb 6.4 oz (135 kg)   10/03/17 1814 130/66 97.7 °F (36.5 °C) Oral 64 18 100 % 74\" (188 cm) 298 lb 12.8 oz (136 kg)   10/03/17 1746 131/67 - - 68 16 98 % - -   10/03/17 1746 131/67 - - 68 - - - -   10/03/17 1731 137/66 - - 75 - - - -   10/03/17 1716 134/79 - - 74 - - - -   10/03/17 1701 134/76 - - 75 - - - -   10/03/17 1646 136/71 - - 72 - - - -   10/03/17 1632 140/73 - - 81 - - - -   10/03/17 1619 126/74 - - 76 - - - -     Physical Exam   Constitutional: He is oriented to person, place, and time. He appears well-developed and well-nourished.   HENT:   Head: Normocephalic and atraumatic.   Eyes: Pupils are equal, round, and reactive to light.   Neck: Normal range of motion. Neck supple. No JVD present. Carotid bruit is not present.   Cardiovascular: Normal rate, regular rhythm, normal heart sounds and " intact distal pulses.    Pulmonary/Chest: Effort normal and breath sounds normal.   Abdominal: Soft. Bowel sounds are normal.   Musculoskeletal: Normal range of motion.   Neurological: He is alert and oriented to person, place, and time. He has normal reflexes.   Skin: Skin is warm and dry.   Psychiatric: He has a normal mood and affect. His behavior is normal. Judgment and thought content normal.       Results Review:   I reviewed the patient's new clinical results.  Lab Results (last 72 hours)     Procedure Component Value Units Date/Time    CBC & Differential [[id number]] Collected:  10/03/17 1418    Specimen:  Blood Updated:  10/03/17 1447    Narrative:       The following orders were created for panel order CBC & Differential.  Procedure                               Abnormality         Status                     ---------                               -----------         ------                     CBC Auto Differential[[id number]]        Abnormal            Final result                 Please view results for these tests on the individual orders.    CBC Auto Differential [[id number]]  (Abnormal) Collected:  10/03/17 1418    Specimen:  Blood Updated:  10/03/17 1447     WBC 9.72 10*3/mm3      RBC 4.71 (L) 10*6/mm3      Hemoglobin 13.9 (L) g/dL      Hematocrit 41.6 %      MCV 88.3 fL      MCH 29.5 pg      MCHC 33.4 g/dL      RDW 14.1 %      RDW-SD 45.1 fl      MPV 9.8 fL      Platelets 253 10*3/mm3      Neutrophil % 75.0 %      Lymphocyte % 17.4 %      Monocyte % 6.8 %      Eosinophil % 0.5 %      Basophil % 0.3 %      Neutrophils, Absolute 7.29 10*3/mm3      Lymphocytes, Absolute 1.69 10*3/mm3      Monocytes, Absolute 0.66 10*3/mm3      Eosinophils, Absolute 0.05 10*3/mm3      Basophils, Absolute 0.03 10*3/mm3     Lactic Acid, Plasma [[id number]]  (Normal) Collected:  10/03/17 1418    Specimen:  Blood Updated:  10/03/17 1451     Lactate 1.1 mmol/L     C-reactive Protein [[id number]]  (Normal) Collected:  10/03/17  1418    Specimen:  Blood Updated:  10/03/17 1454     C-Reactive Protein 0.74 mg/dL     Comprehensive Metabolic Panel [[id number]]  (Abnormal) Collected:  10/03/17 1418    Specimen:  Blood Updated:  10/03/17 1454     Glucose 99 mg/dL      BUN 16 mg/dL      Creatinine 0.90 mg/dL      Sodium 142 mmol/L      Potassium 3.9 mmol/L      Chloride 107 mmol/L      CO2 21.0 (L) mmol/L      Calcium 9.3 mg/dL      Total Protein 7.6 g/dL      Albumin 4.50 g/dL      ALT (SGPT) 51 U/L      AST (SGOT) 31 U/L      Alkaline Phosphatase 70 U/L      Total Bilirubin 0.7 mg/dL      eGFR Non African Amer 92 mL/min/1.73      Globulin 3.1 gm/dL      A/G Ratio 1.5 g/dL      BUN/Creatinine Ratio 17.8     Anion Gap 14.0 (H) mmol/L     Lipase [[id number]]  (Abnormal) Collected:  10/03/17 1418    Specimen:  Blood Updated:  10/03/17 1454     Lipase 18 (L) U/L     Amylase [[id number]]  (Normal) Collected:  10/03/17 1418    Specimen:  Blood Updated:  10/03/17 1454     Amylase 35 U/L     Urinalysis With / Culture If Indicated - Urine, Clean Catch [[id number]]  (Abnormal) Collected:  10/03/17 1516    Specimen:  Urine from Urine, Clean Catch Updated:  10/03/17 1527     Color, UA Yellow     Appearance, UA Clear     pH, UA <=5.0     Specific Gravity, UA 1.009     Glucose, UA Negative     Ketones, UA Trace (A)     Bilirubin, UA Negative     Blood, UA Negative     Protein, UA Negative     Leuk Esterase, UA Negative     Nitrite, UA Negative     Urobilinogen, UA 0.2 E.U./dL    Narrative:       Urine microscopic not indicated.    Protime-INR [[id number]]  (Normal) Collected:  10/03/17 1617    Specimen:  Blood Updated:  10/03/17 1641     Protime 13.8 Seconds      INR 1.03    aPTT [[id number]]  (Normal) Collected:  10/03/17 1617    Specimen:  Blood Updated:  10/03/17 1641     PTT 24.1 seconds     Factor 5 Leiden [[id number]] Collected:  10/03/17 1842    Specimen:  Blood Updated:  10/03/17 1858    Factor II, DNA Analysis [[id number]] Collected:  10/03/17 1842     Specimen:  Blood Updated:  10/03/17 1858    Homocysteine [[id number]] Collected:  10/03/17 1842    Specimen:  Blood Updated:  10/03/17 1858    Protein S Functional [[id number]] Collected:  10/03/17 1842    Specimen:  Blood Updated:  10/03/17 1859    Protein C Activity [[id number]] Collected:  10/03/17 1843    Specimen:  Blood Updated:  10/03/17 1859    Protein C Antigen, Total [[id number]] Collected:  10/03/17 1842    Specimen:  Blood Updated:  10/03/17 1859    Lupus Anticoagulant [[id number]] Collected:  10/03/17 1843    Specimen:  Blood Updated:  10/03/17 1859    Factor 5 Activity [[id number]] Collected:  10/03/17 1843    Specimen:  Blood Updated:  10/03/17 1859    Protein S Antigen, Total [[id number]] Collected:  10/03/17 1842    Specimen:  Blood Updated:  10/03/17 1859    Antiphosphotidyl Antibodies Panel II [[id number]] Collected:  10/03/17 1842    Specimen:  Blood Updated:  10/03/17 1859    Anticardiolipin Antibody, IgG / M, Qn [[id number]] Collected:  10/03/17 1842    Specimen:  Blood Updated:  10/03/17 1900    CBC & Differential [[id number]] Collected:  10/03/17 1842    Specimen:  Blood Updated:  10/03/17 1918    Narrative:       The following orders were created for panel order CBC & Differential.  Procedure                               Abnormality         Status                     ---------                               -----------         ------                     CBC Auto Differential[[id number]]        Normal              Final result                 Please view results for these tests on the individual orders.    CBC Auto Differential [[id number]]  (Normal) Collected:  10/03/17 1842    Specimen:  Blood Updated:  10/03/17 1918     WBC 10.70 10*3/mm3      RBC 4.87 10*6/mm3      Hemoglobin 14.4 g/dL      Hematocrit 43.4 %      MCV 89.1 fL      MCH 29.6 pg      MCHC 33.2 g/dL      RDW 14.1 %      RDW-SD 45.4 fl      MPV 10.1 fL      Platelets 275 10*3/mm3      Neutrophil % 70.7 %      Lymphocyte % 21.8 %       Monocyte % 6.4 %      Eosinophil % 0.7 %      Basophil % 0.4 %      Neutrophils, Absolute 7.58 10*3/mm3      Lymphocytes, Absolute 2.33 10*3/mm3      Monocytes, Absolute 0.68 10*3/mm3      Eosinophils, Absolute 0.07 10*3/mm3      Basophils, Absolute 0.04 10*3/mm3     Protime-INR [[id number]]  (Normal) Collected:  10/03/17 1842    Specimen:  Blood Updated:  10/03/17 1927     Protime 12.9 Seconds      INR 0.94    aPTT [[id number]]  (Normal) Collected:  10/03/17 1842    Specimen:  Blood Updated:  10/03/17 1927     PTT 24.9 seconds     Antithrombin III [[id number]]  (Normal) Collected:  10/03/17 1842    Specimen:  Blood Updated:  10/03/17 1927     Antithrombin Activity 107 %     Basic Metabolic Panel [[id number]]  (Abnormal) Collected:  10/03/17 1842    Specimen:  Blood Updated:  10/03/17 1930     Glucose 89 mg/dL      BUN 13 mg/dL      Creatinine 0.88 mg/dL      Sodium 143 mmol/L      Potassium 3.8 mmol/L      Chloride 105 mmol/L      CO2 24.0 mmol/L      Calcium 9.3 mg/dL      eGFR Non African Amer 94 mL/min/1.73      BUN/Creatinine Ratio 14.8     Anion Gap 14.0 (H) mmol/L     Narrative:       GFR Normal >60  Chronic Kidney Disease <60  Kidney Failure <15    CBC & Differential [[id number]] Collected:  10/04/17 0530    Specimen:  Blood Updated:  10/04/17 0610    Narrative:       The following orders were created for panel order CBC & Differential.  Procedure                               Abnormality         Status                     ---------                               -----------         ------                     CBC Auto Differential[[id number]]        Abnormal            Final result                 Please view results for these tests on the individual orders.    CBC Auto Differential [[id number]]  (Abnormal) Collected:  10/04/17 0530    Specimen:  Blood Updated:  10/04/17 0610     WBC 9.13 10*3/mm3      RBC 4.35 (L) 10*6/mm3      Hemoglobin 13.0 (L) g/dL      Hematocrit 39.9 (L) %      MCV 91.7 fL      MCH 29.9 pg       MCHC 32.6 (L) g/dL      RDW 14.1 %      RDW-SD 47.4 fl      MPV 10.0 fL      Platelets 230 10*3/mm3      Neutrophil % 58.6 %      Lymphocyte % 30.6 %      Monocyte % 9.0 %      Eosinophil % 1.3 %      Basophil % 0.3 %      Immature Grans % 0.2 %      Neutrophils, Absolute 5.35 10*3/mm3      Lymphocytes, Absolute 2.79 10*3/mm3      Monocytes, Absolute 0.82 10*3/mm3      Eosinophils, Absolute 0.12 10*3/mm3      Basophils, Absolute 0.03 10*3/mm3      Immature Grans, Absolute 0.02 10*3/mm3     Basic Metabolic Panel [[id number]]  (Abnormal) Collected:  10/04/17 0530    Specimen:  Blood Updated:  10/04/17 0629     Glucose 107 (H) mg/dL      BUN 11 mg/dL      Creatinine 0.98 mg/dL      Sodium 143 mmol/L      Potassium 4.1 mmol/L      Chloride 107 mmol/L      CO2 24.0 mmol/L      Calcium 8.9 mg/dL      eGFR Non African Amer 83 mL/min/1.73      BUN/Creatinine Ratio 11.2     Anion Gap 12.0 mmol/L     Narrative:       GFR Normal >60  Chronic Kidney Disease <60  Kidney Failure <15          Lab Results   Component Value Date    ECHOEFEST 60 10/04/2017       Imaging Results (last 72 hours)     Procedure Component Value Units Date/Time    CT Abdomen Pelvis With Contrast [[id number]] Collected:  10/03/17 1541     Updated:  10/03/17 1554    Narrative:       EXAMINATION: CT ABDOMEN PELVIS W CONTRAST-      10/3/2017 1:59 PM CST     HISTORY: Left lower quadrant abdominal pain and left flank pain. Pain  radiates from back to front.     In order to have a CT radiation dose as low as reasonably achievable  Automated Exposure Control was utilized for adjustment of the mA and/or  KV according to patient size.     DLP in mGycm= 1150.     Discrete 4 x 2 cm nonenhancing parenchymal focus at the upper pole of  the left kidney compatible with focal renal infarction. There is no  atherosclerotic disease seen involving the upper aorta or renal arteries  and evaluation for embolic disease is recommended.     The only comparison study is a  noncontrast CT from 05/24/17. An infarct  likely would not be seen on a noncontrast study. No renal abnormality  could be seen at that time.     Given the patient's left-sided symptoms this finding must be considered  to be acute.     Normal heart size.  Clear lung bases.  A few small hepatic cysts are again seen.  Otherwise normal liver.  Normal gallbladder, pancreas, spleen, adrenal glands, and right.     No bowel dilation.  No free fluid.  No appendicitis or diverticulitis.     No pelvic mass or fluid.     Summary:  1. Focal infarction of the upper pole of the left kidney. There is no  evidence of atherosclerotic vascular disease and evaluation for embolic  disease is recommended.                                   This report was finalized on 10/03/2017 15:51 by Dr. Edgar Gómez MD.        Assessment/Plan     Principal Problem:    Renal infarction  Active Problems:    Hypertension    Herniated intervertebral disc of lumbar spine    Disease of thyroid gland    PFO (patent foramen ovale)    Plan:  Unlikely PFO is cause of renal infarct will rule out other embolic cause  Doppler Ultrasound of lower extremities   Monitor Telemetry  Patient currently is on full dose Lovenox  Patient was seen by Vascular and they have signed off  Further orders per Dr. Reis    Thank you for asking us to follow this patient with you.     WIL Mckinney  10/04/17  3:28 PM

## 2017-10-04 NOTE — PROGRESS NOTES
[facility] Medicine Services  INPATIENT PROGRESS NOTE    Length of Stay: 1  Date of Admission: 10/3/2017  Primary Care Physician: Lev Stallworth MD    Subjective     Chief Complaint:     Left flank pain    HPI     Echocardiogram with bubble study is consistent with patent foramen ovale.  Workup for hypercoagulable abnormalities is still pending.  CBC and CMP are within normal limits.  The patient's pain is not much better controlled today with PCA.  He has used PCA today only once or twice and his pain is controlled primarily with oral medication.  Hopefully we can discontinue PCA tomorrow.    Review of Systems     All pertinent negatives and positives are as above. All other systems have been reviewed and are negative unless otherwise stated.     Objective    Temp:  [97.4 °F (36.3 °C)-98.2 °F (36.8 °C)] 97.4 °F (36.3 °C)  Heart Rate:  [60-81] 60  Resp:  [16-18] 16  BP: (102-140)/(50-82) 107/58     Specimen:  Blood Updated:  10/03/17 1447     WBC 9.72 10*3/mm3      RBC 4.71 (L) 10*6/mm3      Hemoglobin 13.9 (L) g/dL      Hematocrit 41.6 %      MCV 88.3 fL      MCH 29.5 pg      MCHC 33.4 g/dL      RDW 14.1 %      RDW-SD 45.1 fl      MPV 9.8 fL      Platelets 253 10*3/mm3      Neutrophil % 75.0 %      Lymphocyte % 17.4 %      Monocyte % 6.8 %      Eosinophil % 0.5 %      Basophil % 0.3 %      Neutrophils, Absolute 7.29 10*3/mm3      Lymphocytes, Absolute 1.69 10*3/mm3      Monocytes, Absolute 0.66 10*3/mm3      Eosinophils, Absolute 0.05 10*3/mm3      Basophils, Absolute 0.03 10*3/mm3     Lactic Acid, Plasma [[id number]]  (Normal) Collected:  10/03/17 1418    Specimen:  Blood Updated:  10/03/17 1451     Lactate 1.1 mmol/L     C-reactive Protein [[id number]]  (Normal) Collected:  10/03/17 1418    Specimen:  Blood Updated:  10/03/17 1454     C-Reactive Protein 0.74 mg/dL     Comprehensive Metabolic Panel [[id number]]  (Abnormal) Collected:  10/03/17 1418    Specimen:  Blood  Updated:  10/03/17 1454     Glucose 99 mg/dL      BUN 16 mg/dL      Creatinine 0.90 mg/dL      Sodium 142 mmol/L      Potassium 3.9 mmol/L      Chloride 107 mmol/L      CO2 21.0 (L) mmol/L      Calcium 9.3 mg/dL      Total Protein 7.6 g/dL      Albumin 4.50 g/dL      ALT (SGPT) 51 U/L      AST (SGOT) 31 U/L      Alkaline Phosphatase 70 U/L      Total Bilirubin 0.7 mg/dL      eGFR Non African Amer 92 mL/min/1.73      Globulin 3.1 gm/dL      A/G Ratio 1.5 g/dL      BUN/Creatinine Ratio 17.8     Anion Gap 14.0 (H) mmol/L     Lipase [[id number]]  (Abnormal) Collected:  10/03/17 1418    Specimen:  Blood Updated:  10/03/17 1454     Lipase 18 (L) U/L     Amylase [[id number]]  (Normal) Collected:  10/03/17 1418    Specimen:  Blood Updated:  10/03/17 1454     Amylase 35 U/L     Urinalysis With / Culture If Indicated - Urine, Clean Catch [[id number]]  (Abnormal) Collected:  10/03/17 1516    Specimen:  Urine from Urine, Clean Catch Updated:  10/03/17 1527     Color, UA Yellow     Appearance, UA Clear     pH, UA <=5.0     Specific Gravity, UA 1.009     Glucose, UA Negative     Ketones, UA Trace (A)     Bilirubin, UA Negative     Blood, UA Negative     Protein, UA Negative     Leuk Esterase, UA Negative     Nitrite, UA Negative     Urobilinogen, UA 0.2 E.U./dL    Narrative:       Urine microscopic not indicated.    Protime-INR [[id number]]  (Normal) Collected:  10/03/17 1617    Specimen:  Blood Updated:  10/03/17 1641     Protime 13.8 Seconds      INR 1.03    aPTT [[id number]]  (Normal) Collected:  10/03/17 1617    Specimen:  Blood Updated:  10/03/17 1641     PTT 24.1 seconds     Factor 5 Leiden [[id number]] Collected:  10/03/17 1842    Specimen:  Blood Updated:  10/03/17 1858    Factor II, DNA Analysis [[id number]] Collected:  10/03/17 1842    Specimen:  Blood Updated:  10/03/17 1858    Homocysteine [[id number]] Collected:  10/03/17 1842    Specimen:  Blood Updated:  10/03/17 1858    Protein S Functional [[id number]] Collected:   10/03/17 1842    Specimen:  Blood Updated:  10/03/17 1859    Protein C Activity [[id number]] Collected:  10/03/17 1843    Specimen:  Blood Updated:  10/03/17 1859    Protein C Antigen, Total [[id number]] Collected:  10/03/17 1842    Specimen:  Blood Updated:  10/03/17 1859    Lupus Anticoagulant [[id number]] Collected:  10/03/17 1843    Specimen:  Blood Updated:  10/03/17 1859    Factor 5 Activity [[id number]] Collected:  10/03/17 1843    Specimen:  Blood Updated:  10/03/17 1859    Protein S Antigen, Total [[id number]] Collected:  10/03/17 1842    Specimen:  Blood Updated:  10/03/17 1859    Antiphosphotidyl Antibodies Panel II [[id number]] Collected:  10/03/17 1842    Specimen:  Blood Updated:  10/03/17 1859    Anticardiolipin Antibody, IgG / M, Qn [[id number]] Collected:  10/03/17 1842    Specimen:  Blood Updated:  10/03/17 1900    CBC & Differential [[id number]] Collected:  10/03/17 1842    Specimen:  Blood Updated:  10/03/17 1918    Narrative:       The following orders were created for panel order CBC & Differential.  Procedure                               Abnormality         Status                     ---------                               -----------         ------                     CBC Auto Differential[[id number]]        Normal              Final result                 Please view results for these tests on the individual orders.    CBC Auto Differential [[id number]]  (Normal) Collected:  10/03/17 1842    Specimen:  Blood Updated:  10/03/17 1918     WBC 10.70 10*3/mm3      RBC 4.87 10*6/mm3      Hemoglobin 14.4 g/dL      Hematocrit 43.4 %      MCV 89.1 fL      MCH 29.6 pg      MCHC 33.2 g/dL      RDW 14.1 %      RDW-SD 45.4 fl      MPV 10.1 fL      Platelets 275 10*3/mm3      Neutrophil % 70.7 %      Lymphocyte % 21.8 %      Monocyte % 6.4 %      Eosinophil % 0.7 %      Basophil % 0.4 %      Neutrophils, Absolute 7.58 10*3/mm3      Lymphocytes, Absolute 2.33 10*3/mm3      Monocytes, Absolute 0.68 10*3/mm3       Eosinophils, Absolute 0.07 10*3/mm3      Basophils, Absolute 0.04 10*3/mm3     Protime-INR [[id number]]  (Normal) Collected:  10/03/17 1842    Specimen:  Blood Updated:  10/03/17 1927     Protime 12.9 Seconds      INR 0.94    aPTT [[id number]]  (Normal) Collected:  10/03/17 1842    Specimen:  Blood Updated:  10/03/17 1927     PTT 24.9 seconds     Antithrombin III [[id number]]  (Normal) Collected:  10/03/17 1842    Specimen:  Blood Updated:  10/03/17 1927     Antithrombin Activity 107 %     Basic Metabolic Panel [[id number]]  (Abnormal) Collected:  10/03/17 1842    Specimen:  Blood Updated:  10/03/17 1930     Glucose 89 mg/dL      BUN 13 mg/dL      Creatinine 0.88 mg/dL      Sodium 143 mmol/L      Potassium 3.8 mmol/L      Chloride 105 mmol/L      CO2 24.0 mmol/L      Calcium 9.3 mg/dL      eGFR Non African Amer 94 mL/min/1.73      BUN/Creatinine Ratio 14.8     Anion Gap 14.0 (H) mmol/L     CBC Auto Differential [[id number]]  (Abnormal) Collected:  10/04/17 0530    Specimen:  Blood Updated:  10/04/17 0610     WBC 9.13 10*3/mm3      RBC 4.35 (L) 10*6/mm3      Hemoglobin 13.0 (L) g/dL      Hematocrit 39.9 (L) %      MCV 91.7 fL      MCH 29.9 pg      MCHC 32.6 (L) g/dL      RDW 14.1 %      RDW-SD 47.4 fl      MPV 10.0 fL      Platelets 230 10*3/mm3      Neutrophil % 58.6 %      Lymphocyte % 30.6 %      Monocyte % 9.0 %      Eosinophil % 1.3 %      Basophil % 0.3 %      Immature Grans % 0.2 %      Neutrophils, Absolute 5.35 10*3/mm3      Lymphocytes, Absolute 2.79 10*3/mm3      Monocytes, Absolute 0.82 10*3/mm3      Eosinophils, Absolute 0.12 10*3/mm3      Basophils, Absolute 0.03 10*3/mm3      Immature Grans, Absolute 0.02 10*3/mm3     Basic Metabolic Panel [[id number]]  (Abnormal) Collected:  10/04/17 0530    Specimen:  Blood Updated:  10/04/17 0629     Glucose 107 (H) mg/dL      BUN 11 mg/dL      Creatinine 0.98 mg/dL      Sodium 143 mmol/L      Potassium 4.1 mmol/L      Chloride 107 mmol/L      CO2 24.0 mmol/L      Calcium  8.9 mg/dL      eGFR Non African Amer 83 mL/min/1.73      BUN/Creatinine Ratio 11.2     Anion Gap 12.0 mmol/L        Imaging Results (last 24 hours)     Procedure Component Value Units Date/Time    CT Abdomen Pelvis With Contrast [[id number]] Collected:  10/03/17 1541     Updated:  10/03/17 1554    Narrative:       EXAMINATION: CT ABDOMEN PELVIS W CONTRAST-      10/3/2017 1:59 PM CST     HISTORY: Left lower quadrant abdominal pain and left flank pain. Pain  radiates from back to front.     In order to have a CT radiation dose as low as reasonably achievable  Automated Exposure Control was utilized for adjustment of the mA and/or  KV according to patient size.     DLP in mGycm= 1150.     Discrete 4 x 2 cm nonenhancing parenchymal focus at the upper pole of  the left kidney compatible with focal renal infarction. There is no  atherosclerotic disease seen involving the upper aorta or renal arteries  and evaluation for embolic disease is recommended.     The only comparison study is a noncontrast CT from 05/24/17. An infarct  likely would not be seen on a noncontrast study. No renal abnormality  could be seen at that time.     Given the patient's left-sided symptoms this finding must be considered  to be acute.     Normal heart size.  Clear lung bases.  A few small hepatic cysts are again seen.  Otherwise normal liver.  Normal gallbladder, pancreas, spleen, adrenal glands, and right.     No bowel dilation.  No free fluid.  No appendicitis or diverticulitis.     No pelvic mass or fluid.     Summary:  1. Focal infarction of the upper pole of the left kidney. There is no  evidence of atherosclerotic vascular disease and evaluation for embolic  disease is recommended.                                   This report was finalized on 10/03/2017 15:51 by Dr. Edgar Gómez MD.             Intake/Output Summary (Last 24 hours) at 10/04/17 1446  Last data filed at 10/04/17 1300   Gross per 24 hour   Intake              694 ml    Output              325 ml   Net              369 ml       Physical Exam    Constitutional: He is oriented to person, place, and time. He appears well-developed and well-nourished. He is cooperative.   HENT:   Head: Normocephalic and atraumatic.   Right Ear: External ear normal.   Left Ear: External ear normal.   Nose: Nose normal.   Mouth/Throat: Oropharynx is clear and moist.   Eyes: Conjunctivae and EOM are normal. Pupils are equal, round, and reactive to light. No scleral icterus.   Neck: No JVD present. No tracheal deviation present. No thyromegaly present.   Cardiovascular: Normal rate, regular rhythm, normal heart sounds and intact distal pulses.    No murmur heard.  Pulmonary/Chest: Effort normal and breath sounds normal. No respiratory distress. He has no wheezes. He has no rales.   Abdominal: Bowel sounds are normal. He exhibits no distension and no mass. There is no tenderness.   Musculoskeletal: Normal range of motion. He exhibits no edema or tenderness.   Neurological: He is alert and oriented to person, place, and time. He has normal reflexes. No cranial nerve deficit. Coordination normal.   Skin: Skin is warm and dry. No rash noted.       Results Review:  I have reviewed the labs, radiology results, and diagnostic studies since my last progress note and made treatment changes reflective of the results.   I have reviewed the current medications.    Assessment/Plan     Hospital Problem List     * (Principal)Renal infarction    PFO (patent foramen ovale)    Hypertension    Herniated intervertebral disc of lumbar spine    Disease of thyroid gland          PLAN:  Continue anticoagulation with Lovenox  Cardiology consultation regarding possible PFO closure    Kunal Cross DO   10/04/17   2:46 PM

## 2017-10-04 NOTE — PLAN OF CARE
Problem: Patient Care Overview (Adult)  Goal: Plan of Care Review  Outcome: Ongoing (interventions implemented as appropriate)    10/04/17 0355   Coping/Psychosocial Response Interventions   Plan Of Care Reviewed With patient   Patient Care Overview   Progress no change   Outcome Evaluation   Outcome Summary/Follow up Plan CT of abdomen and pelvis shows left kidney infarct. Pt c/o of left sided flank pain. Morphine PCA in place but patient states this isn't helping completely. Vascular consult for this am. Continue to monitor.          Problem: Pain, Acute (Adult)  Goal: Identify Related Risk Factors and Signs and Symptoms  Outcome: Outcome(s) achieved Date Met:  10/04/17  Goal: Acceptable Pain Control/Comfort Level  Outcome: Ongoing (interventions implemented as appropriate)    Problem: Renal Failure/Kidney Injury, Acute (Adult)  Goal: Signs and Symptoms of Listed Potential Problems Will be Absent or Manageable (Renal Failure/Kidney Injury, Acute)  Outcome: Ongoing (interventions implemented as appropriate)

## 2017-10-04 NOTE — PLAN OF CARE
Problem: Patient Care Overview (Adult)  Goal: Plan of Care Review  Outcome: Ongoing (interventions implemented as appropriate)    10/04/17 1508   Coping/Psychosocial Response Interventions   Plan Of Care Reviewed With patient   Patient Care Overview   Progress no change   Outcome Evaluation   Outcome Summary/Follow up Plan      10/04/17 1508   Coping/Psychosocial Response Interventions   Plan Of Care Reviewed With patient   Patient Care Overview   Progress no change   Outcome Evaluation   Outcome Summary/Follow up Plan Morphine PCA in place with relief, will continue to monitor.    .          Problem: Pain, Acute (Adult)  Goal: Acceptable Pain Control/Comfort Level  Outcome: Ongoing (interventions implemented as appropriate)    Problem: Renal Failure/Kidney Injury, Acute (Adult)  Goal: Signs and Symptoms of Listed Potential Problems Will be Absent or Manageable (Renal Failure/Kidney Injury, Acute)  Outcome: Ongoing (interventions implemented as appropriate)

## 2017-10-05 VITALS
DIASTOLIC BLOOD PRESSURE: 55 MMHG | OXYGEN SATURATION: 97 % | WEIGHT: 300.2 LBS | HEART RATE: 71 BPM | TEMPERATURE: 98.5 F | BODY MASS INDEX: 38.53 KG/M2 | HEIGHT: 74 IN | SYSTOLIC BLOOD PRESSURE: 104 MMHG | RESPIRATION RATE: 20 BRPM

## 2017-10-05 LAB
ANION GAP SERPL CALCULATED.3IONS-SCNC: 9 MMOL/L (ref 4–13)
BASOPHILS # BLD AUTO: 0.04 10*3/MM3 (ref 0–0.2)
BASOPHILS NFR BLD AUTO: 0.4 % (ref 0–2)
BUN BLD-MCNC: 10 MG/DL (ref 5–21)
BUN/CREAT SERPL: 8.5 (ref 7–25)
CALCIUM SPEC-SCNC: 8.8 MG/DL (ref 8.4–10.4)
CARDIOLIPIN IGG SER IA-ACNC: <9 GPL U/ML (ref 0–14)
CARDIOLIPIN IGM SER IA-ACNC: <9 MPL U/ML (ref 0–12)
CHLORIDE SERPL-SCNC: 104 MMOL/L (ref 98–110)
CO2 SERPL-SCNC: 28 MMOL/L (ref 24–31)
CREAT BLD-MCNC: 1.18 MG/DL (ref 0.5–1.4)
DEPRECATED RDW RBC AUTO: 48.4 FL (ref 40–54)
EOSINOPHIL # BLD AUTO: 0.12 10*3/MM3 (ref 0–0.7)
EOSINOPHIL NFR BLD AUTO: 1.1 % (ref 0–4)
ERYTHROCYTE [DISTWIDTH] IN BLOOD BY AUTOMATED COUNT: 14.2 % (ref 12–15)
GFR SERPL CREATININE-BSD FRML MDRD: 67 ML/MIN/1.73
GLUCOSE BLD-MCNC: 97 MG/DL (ref 70–100)
HCT VFR BLD AUTO: 38.6 % (ref 40–52)
HCYS SERPL-SCNC: 9.1 UMOL/L (ref 0–15)
HGB BLD-MCNC: 12.5 G/DL (ref 14–18)
IMM GRANULOCYTES # BLD: 0.03 10*3/MM3 (ref 0–0.03)
IMM GRANULOCYTES NFR BLD: 0.3 % (ref 0–5)
LYMPHOCYTES # BLD AUTO: 2.55 10*3/MM3 (ref 0.72–4.86)
LYMPHOCYTES NFR BLD AUTO: 23.2 % (ref 15–45)
MCH RBC QN AUTO: 30 PG (ref 28–32)
MCHC RBC AUTO-ENTMCNC: 32.4 G/DL (ref 33–36)
MCV RBC AUTO: 92.8 FL (ref 82–95)
MONOCYTES # BLD AUTO: 1.31 10*3/MM3 (ref 0.19–1.3)
MONOCYTES NFR BLD AUTO: 11.9 % (ref 4–12)
NEUTROPHILS # BLD AUTO: 6.96 10*3/MM3 (ref 1.87–8.4)
NEUTROPHILS NFR BLD AUTO: 63.1 % (ref 39–78)
PLATELET # BLD AUTO: 198 10*3/MM3 (ref 130–400)
PMV BLD AUTO: 10 FL (ref 6–12)
POTASSIUM BLD-SCNC: 4.1 MMOL/L (ref 3.5–5.3)
PROT S PPP-ACNC: 122 % (ref 60–150)
RBC # BLD AUTO: 4.16 10*6/MM3 (ref 4.8–5.9)
SODIUM BLD-SCNC: 141 MMOL/L (ref 135–145)
WBC NRBC COR # BLD: 11.01 10*3/MM3 (ref 4.8–10.8)

## 2017-10-05 PROCEDURE — 85025 COMPLETE CBC W/AUTO DIFF WBC: CPT | Performed by: FAMILY MEDICINE

## 2017-10-05 PROCEDURE — 80048 BASIC METABOLIC PNL TOTAL CA: CPT | Performed by: FAMILY MEDICINE

## 2017-10-05 PROCEDURE — 25010000002 ENOXAPARIN PER 10 MG: Performed by: FAMILY MEDICINE

## 2017-10-05 RX ORDER — OXYCODONE AND ACETAMINOPHEN 7.5; 325 MG/1; MG/1
1 TABLET ORAL EVERY 4 HOURS PRN
Qty: 15 TABLET | Refills: 0 | Status: SHIPPED | OUTPATIENT
Start: 2017-10-05 | End: 2017-10-13

## 2017-10-05 RX ADMIN — OXYCODONE HYDROCHLORIDE AND ACETAMINOPHEN 2 TABLET: 7.5; 325 TABLET ORAL at 01:59

## 2017-10-05 RX ADMIN — LEVOTHYROXINE SODIUM 50 MCG: 50 TABLET ORAL at 09:05

## 2017-10-05 RX ADMIN — ENOXAPARIN SODIUM 140 MG: 150 INJECTION SUBCUTANEOUS at 06:00

## 2017-10-05 RX ADMIN — OXYCODONE HYDROCHLORIDE AND ACETAMINOPHEN 2 TABLET: 7.5; 325 TABLET ORAL at 06:04

## 2017-10-05 RX ADMIN — ESCITSLOPRAM 20 MG: 10 TABLET ORAL at 09:05

## 2017-10-05 RX ADMIN — LISINOPRIL 10 MG: 10 TABLET ORAL at 09:05

## 2017-10-05 NOTE — PLAN OF CARE
Problem: Patient Care Overview (Adult)  Goal: Plan of Care Review  Outcome: Ongoing (interventions implemented as appropriate)    10/05/17 0353   Coping/Psychosocial Response Interventions   Plan Of Care Reviewed With patient   Patient Care Overview   Progress improving   Outcome Evaluation   Outcome Summary/Follow up Plan Patient not using PCA as much. States pain has much improved since admission. PO Percocet x 1. Coag studies per Dr. Reis ordered. Patient wants to go home today as he is leaving for [place] on Friday.          Problem: Pain, Acute (Adult)  Goal: Acceptable Pain Control/Comfort Level  Outcome: Ongoing (interventions implemented as appropriate)    Problem: Renal Failure/Kidney Injury, Acute (Adult)  Goal: Signs and Symptoms of Listed Potential Problems Will be Absent or Manageable (Renal Failure/Kidney Injury, Acute)  Outcome: Ongoing (interventions implemented as appropriate)

## 2017-10-06 LAB
FACT V ACT/NOR PPP: 121 % (ref 70–150)
LA NT DPL PPP: 49.8 SEC (ref 0–55)
LA NT DPL/LA NT HPL PPP-RTO: 1 RATIO (ref 0–1.4)
LA NT PLATELET PPP: 35.7 SEC (ref 0–51.9)
LUPUS ANTICOAGULANT REFLEX: NORMAL
PROT C AG PPP IA-ACNC: 104 % (ref 60–150)
PROTEIN S-FUNCTIONAL: 139 % (ref 63–140)
PRT C ACTIVITY (CHROMOGENIC): 127 %
SCREEN DRVVT: 42.2 SEC (ref 0–47)
THROMBIN TIME: 18.7 SEC (ref 0–23)

## 2017-10-09 LAB
F5 GENE MUT ANL BLD/T: ABNORMAL
FACTOR V COMMENT: ABNORMAL

## 2017-10-10 LAB
F2 GENE MUT ANL BLD/T: NORMAL
Lab: NORMAL

## 2017-10-16 LAB
ANTI-PHOSPHATIDIC ACID: NORMAL
ANTI-PHOSPHATIDIC,IGA: 5.7 U/ML
ANTI-PHOSPHATIDIC,IGG: 3.8 U/ML
ANTI-PHOSPHATIDIC,IGM: 1.8 U/ML
ANTI-PHOSPHATIDYL GLYCEROL, IGA: 2.5 U/ML
ANTI-PHOSPHATIDYL GLYCEROL, IGG: 3.3 U/ML
ANTI-PHOSPHATIDYL GLYCEROL, IGM: 2.2 U/ML
ANTI-PHOSPHATIDYL GLYCEROL: NORMAL
ANTI-PHOSPHATIDYL INOSITOL: NORMAL
ANTI-PHOSPHATIDYL,IGA: 6.7 U/ML
ANTI-PHOSPHATIDYL,IGG: 3.3 U/ML
ANTI-PHOSPHATIDYL,IGM: 2 U/ML
ANTI-PHOSPHATIDYLETHANOLAMINE, IGA: 2.3 U/ML
ANTI-PHOSPHATIDYLETHANOLAMINE, IGG: 0.2 U/ML
ANTI-PHOSPHATIDYLETHANOLAMINE, IGM: 0.5 U/ML
ANTI-PHOSPHATIDYLETHANOLAMINE: NORMAL

## 2017-10-26 ENCOUNTER — OFFICE VISIT (OUTPATIENT)
Dept: NEUROSURGERY | Facility: CLINIC | Age: 42
End: 2017-10-26

## 2017-10-26 ENCOUNTER — HOSPITAL ENCOUNTER (OUTPATIENT)
Dept: MRI IMAGING | Facility: HOSPITAL | Age: 42
Discharge: HOME | End: 2017-10-26
Admitting: NURSE PRACTITIONER

## 2017-10-26 VITALS — WEIGHT: 298 LBS | BODY MASS INDEX: 38.24 KG/M2 | HEIGHT: 74 IN

## 2017-10-26 DIAGNOSIS — M51.26 DISPLACEMENT OF LUMBAR INTERVERTEBRAL DISC WITHOUT MYELOPATHY: ICD-10-CM

## 2017-10-26 DIAGNOSIS — Z78.9 NON-SMOKER: ICD-10-CM

## 2017-10-26 DIAGNOSIS — M51.37 DEGENERATION OF LUMBAR OR LUMBOSACRAL INTERVERTEBRAL DISC: ICD-10-CM

## 2017-10-26 DIAGNOSIS — M54.16 LUMBAR RADICULOPATHY: ICD-10-CM

## 2017-10-26 DIAGNOSIS — M51.26 DISPLACEMENT OF LUMBAR INTERVERTEBRAL DISC WITHOUT MYELOPATHY: Primary | ICD-10-CM

## 2017-10-26 PROBLEM — M51.379 DEGENERATION OF LUMBAR OR LUMBOSACRAL INTERVERTEBRAL DISC: Status: ACTIVE | Noted: 2017-10-26

## 2017-10-26 PROCEDURE — 99213 OFFICE O/P EST LOW 20 MIN: CPT | Performed by: NEUROLOGICAL SURGERY

## 2017-10-26 PROCEDURE — 72148 MRI LUMBAR SPINE W/O DYE: CPT

## 2017-10-26 RX ORDER — CYCLOBENZAPRINE HCL 10 MG
TABLET ORAL
Refills: 1 | COMMUNITY
Start: 2017-09-26 | End: 2018-02-15

## 2017-10-26 RX ORDER — SYRINGE WITH NEEDLE, 1 ML 25GX5/8"
SYRINGE, EMPTY DISPOSABLE MISCELLANEOUS
Refills: 1 | COMMUNITY
Start: 2017-09-17

## 2017-10-26 RX ORDER — TOPIRAMATE 50 MG/1
50 TABLET, FILM COATED ORAL DAILY
COMMUNITY
Start: 2017-10-24

## 2017-10-26 RX ORDER — LISINOPRIL 10 MG/1
TABLET ORAL
Refills: 5 | COMMUNITY
Start: 2017-10-13 | End: 2022-12-19 | Stop reason: DRUGHIGH

## 2017-10-26 RX ORDER — ESCITALOPRAM OXALATE 20 MG/1
TABLET ORAL
Refills: 5 | COMMUNITY
Start: 2017-10-14 | End: 2019-05-09

## 2017-10-26 RX ORDER — TESTOSTERONE CYPIONATE 200 MG/ML
200 INJECTION, SOLUTION INTRAMUSCULAR
COMMUNITY
Start: 2017-10-24

## 2017-10-26 RX ORDER — LEVOTHYROXINE SODIUM 0.05 MG/1
TABLET ORAL
Refills: 11 | COMMUNITY
Start: 2017-10-13

## 2017-10-26 NOTE — PROGRESS NOTES
SUBJECTIVE:  Patient ID: Alin Richard is a 42 y.o. male is here today for follow-up.    Chief Complaint: Back pain  Chief Complaint   Patient presents with   • back & leg pain     patient had MRI @ [facility] and is here to discuss results; patient states his leg pain is bilaterally        HPI  42-year-old gentleman gives us about a year long history of left paraspinal back pain and left lower extremity radicular pain.  He has been treated conservatively the last few months.  He did a dedicated course of physical therapy for 12 sessions which she felt made things worse.  He takes Norco 10 mg 4 times a day right now.  He has done a steroid treatments and muscle relaxers also.  He is most recently started developed right lower extremity radicular pain as well as the left although the left is much worse.  He does not describe any numbness and tingling.  He says the back and legs are about 50-50.  Its worse with activity at the end of the day.    The following portions of the patient's history were reviewed and updated as appropriate: allergies, current medications, past family history, past medical history, past social history, past surgical history and problem list.    OBJECTIVE:    Review of Systems   Musculoskeletal: Positive for back pain.   All other systems reviewed and are negative.         Physical Exam   Constitutional: He is oriented to person, place, and time.   Neurological: He is oriented to person, place, and time. He has normal strength. He has a normal Finger-Nose-Finger Test. Gait normal.   Psychiatric: His speech is normal.       Neurologic Exam     Mental Status   Oriented to person, place, and time.   Attention: normal.   Speech: speech is normal   Level of consciousness: alert  Knowledge: good.     Cranial Nerves   Cranial nerves II through XII intact.     Motor Exam   Muscle bulk: normal  Overall muscle tone: normal  Right arm pronator drift: absent  Left arm pronator drift: absent    Strength    Strength 5/5 throughout.     Sensory Exam   Light touch normal.   Pinprick normal.     Gait, Coordination, and Reflexes     Gait  Gait: normal    Coordination   Finger to nose coordination: normal    Tremor   Resting tremor: absent  Intention tremor: absent  Action tremor: absent    Reflexes   Reflexes 2+ except as noted.       Independent Review of Radiographic Studies:   MRI  the lumbar spine shows a right paracentral disc herniation at L5-S1 causing impingement on the descending and exiting nerve roots.  L4-5 shows a left paracentral disc herniation causing impingement on the descending and exiting nerve roots    ASSESSMENT/PLAN:  The patient has some back pain and bilateral lower extremity left worse than right radicular pain due to 2 herniated disks in his lumbar spine.  He has been through an extensive course of conservative care.  My recommendation at this point would be a left L4-5 microdiscectomy and a right L5-S1 microdiscectomy to address the nerve root impingement she having at those levels.  The risks and benefits of the procedures were discussed at length which included but were not limited to infection, bleeding, paralysis, spinal fluid leak, bowel bladder incontinence, stroke, coma, and death.  In addition I was very clear with Mr. Richard that medically supervised weight loss needs to be a part of his recovery plan and return to work plan.  I also explained to him the nature of degenerative disc disease and that he may present with further back problems in the future from the injury to these disks.  He acknowledged understanding of this.  His questions concerns were addressed.  We will get him prepped and scheduled as soon as possible.      1. Displacement of lumbar intervertebral disc without myelopathy    2. Degeneration of lumbar or lumbosacral intervertebral disc    3. Lumbar radiculopathy    4. BMI 38.0-38.9,adult    5. Non-smoker            Return for postoperatively.      Favio Castillo,  MD

## 2017-11-20 ENCOUNTER — HOSPITAL ENCOUNTER (OUTPATIENT)
Dept: GENERAL RADIOLOGY | Facility: HOSPITAL | Age: 42
Discharge: HOME | End: 2017-11-20
Admitting: NEUROLOGICAL SURGERY

## 2017-11-20 ENCOUNTER — APPOINTMENT (OUTPATIENT)
Dept: PREADMISSION TESTING | Facility: HOSPITAL | Age: 42
End: 2017-11-20

## 2017-11-20 VITALS
WEIGHT: 306 LBS | OXYGEN SATURATION: 97 % | RESPIRATION RATE: 16 BRPM | HEIGHT: 73 IN | HEART RATE: 86 BPM | BODY MASS INDEX: 40.56 KG/M2 | DIASTOLIC BLOOD PRESSURE: 75 MMHG | SYSTOLIC BLOOD PRESSURE: 111 MMHG

## 2017-11-20 DIAGNOSIS — M51.26 DISPLACEMENT OF LUMBAR INTERVERTEBRAL DISC WITHOUT MYELOPATHY: ICD-10-CM

## 2017-11-20 LAB
ALBUMIN SERPL-MCNC: 4.4 G/DL (ref 3.5–5)
ALBUMIN/GLOB SERPL: 1.4 G/DL (ref 1.1–2.5)
ALP SERPL-CCNC: 79 U/L (ref 24–120)
ALT SERPL W P-5'-P-CCNC: 49 U/L (ref 0–54)
ANION GAP SERPL CALCULATED.3IONS-SCNC: 13 MMOL/L (ref 4–13)
AST SERPL-CCNC: 24 U/L (ref 7–45)
BASOPHILS # BLD AUTO: 0.02 10*3/MM3 (ref 0–0.2)
BASOPHILS NFR BLD AUTO: 0.3 % (ref 0–2)
BILIRUB SERPL-MCNC: 0.7 MG/DL (ref 0.1–1)
BILIRUB UR QL STRIP: NEGATIVE
BUN BLD-MCNC: 15 MG/DL (ref 5–21)
BUN/CREAT SERPL: 11.7 (ref 7–25)
CALCIUM SPEC-SCNC: 9.3 MG/DL (ref 8.4–10.4)
CHLORIDE SERPL-SCNC: 107 MMOL/L (ref 98–110)
CLARITY UR: CLEAR
CO2 SERPL-SCNC: 25 MMOL/L (ref 24–31)
COLOR UR: YELLOW
CREAT BLD-MCNC: 1.28 MG/DL (ref 0.5–1.4)
DEPRECATED RDW RBC AUTO: 42.7 FL (ref 40–54)
EOSINOPHIL # BLD AUTO: 0.06 10*3/MM3 (ref 0–0.7)
EOSINOPHIL NFR BLD AUTO: 0.9 % (ref 0–4)
ERYTHROCYTE [DISTWIDTH] IN BLOOD BY AUTOMATED COUNT: 13 % (ref 12–15)
GFR SERPL CREATININE-BSD FRML MDRD: 61 ML/MIN/1.73
GLOBULIN UR ELPH-MCNC: 3.1 GM/DL
GLUCOSE BLD-MCNC: 95 MG/DL (ref 70–100)
GLUCOSE UR STRIP-MCNC: NEGATIVE MG/DL
HCT VFR BLD AUTO: 42.1 % (ref 40–52)
HGB BLD-MCNC: 13.7 G/DL (ref 14–18)
HGB UR QL STRIP.AUTO: NEGATIVE
IMM GRANULOCYTES # BLD: 0.01 10*3/MM3 (ref 0–0.03)
IMM GRANULOCYTES NFR BLD: 0.1 % (ref 0–5)
KETONES UR QL STRIP: ABNORMAL
LEUKOCYTE ESTERASE UR QL STRIP.AUTO: NEGATIVE
LYMPHOCYTES # BLD AUTO: 1.92 10*3/MM3 (ref 0.72–4.86)
LYMPHOCYTES NFR BLD AUTO: 27.9 % (ref 15–45)
MCH RBC QN AUTO: 29.4 PG (ref 28–32)
MCHC RBC AUTO-ENTMCNC: 32.5 G/DL (ref 33–36)
MCV RBC AUTO: 90.3 FL (ref 82–95)
MONOCYTES # BLD AUTO: 0.4 10*3/MM3 (ref 0.19–1.3)
MONOCYTES NFR BLD AUTO: 5.8 % (ref 4–12)
NEUTROPHILS # BLD AUTO: 4.48 10*3/MM3 (ref 1.87–8.4)
NEUTROPHILS NFR BLD AUTO: 65 % (ref 39–78)
NITRITE UR QL STRIP: NEGATIVE
PH UR STRIP.AUTO: 5.5 [PH] (ref 5–8)
PLATELET # BLD AUTO: 245 10*3/MM3 (ref 130–400)
PMV BLD AUTO: 9.9 FL (ref 6–12)
POTASSIUM BLD-SCNC: 4.4 MMOL/L (ref 3.5–5.3)
PROT SERPL-MCNC: 7.5 G/DL (ref 6.3–8.7)
PROT UR QL STRIP: NEGATIVE
RBC # BLD AUTO: 4.66 10*6/MM3 (ref 4.8–5.9)
SODIUM BLD-SCNC: 145 MMOL/L (ref 135–145)
SP GR UR STRIP: 1.02 (ref 1–1.03)
UROBILINOGEN UR QL STRIP: ABNORMAL
WBC NRBC COR # BLD: 6.89 10*3/MM3 (ref 4.8–10.8)

## 2017-11-20 PROCEDURE — 85025 COMPLETE CBC W/AUTO DIFF WBC: CPT | Performed by: NEUROLOGICAL SURGERY

## 2017-11-20 PROCEDURE — 71020 HC CHEST PA AND LATERAL: CPT

## 2017-11-20 PROCEDURE — 93010 ELECTROCARDIOGRAM REPORT: CPT | Performed by: INTERNAL MEDICINE

## 2017-11-20 PROCEDURE — 80053 COMPREHEN METABOLIC PANEL: CPT | Performed by: NEUROLOGICAL SURGERY

## 2017-11-20 PROCEDURE — 93005 ELECTROCARDIOGRAM TRACING: CPT

## 2017-11-20 PROCEDURE — 36415 COLL VENOUS BLD VENIPUNCTURE: CPT

## 2017-11-20 PROCEDURE — 81003 URINALYSIS AUTO W/O SCOPE: CPT | Performed by: NEUROLOGICAL SURGERY

## 2017-11-20 RX ORDER — OXYCODONE AND ACETAMINOPHEN 7.5; 325 MG/1; MG/1
1 TABLET ORAL AS NEEDED
COMMUNITY
End: 2017-12-01 | Stop reason: HOSPADM

## 2017-11-20 NOTE — DISCHARGE INSTRUCTIONS
DAY OF SURGERY INSTRUCTIONS        YOUR SURGEON: DR YARON WATERMAN    PROCEDURE: BILATERAL LUMBER MICRODISCECTOMY LEFT LUMBAR 4-5 AND RIGHT LUMBAR 5- SACRAL 1    DATE OF SURGERY: December 1, 2017    ARRIVAL TIME: AS DIRECTED BY OFFICE    DAY OF SURGERY TAKE ONLY THESE MEDICATIONS: NONE        BEFORE YOU COME TO THE HOSPITAL  (Pre-op instructions)  • Do not eat, drink, smoke or chew gum after midnight the night before surgery.  This also includes no mints.  • Morning of surgery take only the medicines you have been instructed with a sip of water unless otherwise instructed  by your physician.  • Do not shave, wear makeup or dark nail polish.  • Remove all jewelry including rings.  • Leave anything you consider valuable at home.  • Leave your suitcase in the car until after your surgery.  • Bring the following with you if applicable:  o Picture ID and insurance, Medicare or Medicaid cards  o Co-pay/deductible required by insurance (cash, check, credit card)  o Copy of advance directive, living will or power-of- documents if not brought to PAT  o CPAP or BIPAP mask and tubing  o Relaxation aids (MP3 player, book, magazine)  • On the day of surgery check in at registration located at the main entrance of the hospital.       Outpatient Surgery Guidelines, Adult  Outpatient procedures are those for which the person having the procedure is allowed to go home the same day as the procedure. Various procedures are done on an outpatient basis. You should follow some general guidelines if you will be having an outpatient procedure.  LET YOUR HEALTH CARE PROVIDER KNOW ABOUT:  · Any allergies you have.  · All medicines you are taking, including vitamins, herbs, eye drops, creams, and over-the-counter medicines.  · Previous problems you or members of your family have had with the use of anesthetics.  · Any blood disorders you have.  · Previous surgeries you have had.  · Medical conditions you have.  RISKS AND  COMPLICATIONS  Your health care provider will discuss possible risks and complications with you before surgery. Common risks and complications include:    · Problems due to the use of anesthetics.  · Blood loss and replacement (does not apply to minor surgical procedures).  · Temporary increase in pain due to surgery.  · Uncorrected pain or problems that the surgery was meant to correct.  · Infection.  · New damage.  BEFORE THE PROCEDURE  · Ask your health care provider about changing or stopping your regular medicines. You may need to stop taking certain medicines in the days or weeks before the procedure.  · Stop smoking at least 2 weeks before surgery. This lowers your risk for complications during and after surgery. Ask your health care provider for help with this if needed.  · Eat your usual meals and a light supper the day before surgery. Continue fluid intake. Do not drink alcohol.  · Do not eat or drink after midnight the night before your surgery.   · Arrange for someone to take you home and to stay with you for 24 hours after the procedure. Medicine given for your procedure may affect your ability to drive or to care for yourself.  · Call your health care provider's office if you develop an illness or problem that may prevent you from safely having your procedure.  AFTER THE PROCEDURE  After surgery, you will be taken to a recovery area, where your progress will be monitored. If there are no complications, you will be allowed to go home when you are awake, stable, and taking fluids well. You may have numbness around the surgical site. Healing will take some time. You will have tenderness at the surgical site and may have some swelling and bruising. You may also have some nausea.  HOME CARE INSTRUCTIONS  · Do not drive for 24 hours, or as directed by your health care provider. Do not drive while taking prescription pain medicines.  · Do not drink alcohol for 24 hours.  · Do not make important decisions or  sign legal documents for 24 hours.  · You may resume a normal diet and activities as directed.  · Do not lift anything heavier than 10 pounds (4.5 kg) or play contact sports until your health care provider says it is okay.  · Change your bandages (dressings) as directed.  · Only take over-the-counter or prescription medicines as directed by your health care provider.  · Follow up with your health care provider as directed.  SEEK MEDICAL CARE IF:  · You have increased bleeding (more than a small spot) from the surgical site.  · You have redness, swelling, or increasing pain in the wound.  · You see pus coming from the wound.  · You have a fever.  · You notice a bad smell coming from the wound or dressing.  · You feel lightheaded or faint.  · You develop a rash.  · You have trouble breathing.  · You develop allergies.  MAKE SURE YOU:  · Understand these instructions.  · Will watch your condition.  · Will get help right away if you are not doing well or get worse.     This information is not intended to replace advice given to you by your health care provider. Make sure you discuss any questions you have with your health care provider.     Document Released: 09/12/2002 Document Revised: 05/03/2016 Document Reviewed: 05/22/2014  [employer] Interactive Patient Education ©2016 [employer]       Fall Prevention in Hospitals, Adult  As a hospital patient, your condition and the treatments you receive can increase your risk for falls. Some additional risk factors for falls in a hospital include:  · Being in an unfamiliar environment.  · Being on bed rest.  · Your surgery.  · Taking certain medicines.  · Your tubing requirements, such as intravenous (IV) therapy or catheters.  It is important that you learn how to decrease fall risks while at the hospital. Below are important tips that can help prevent falls.  SAFETY TIPS FOR PREVENTING FALLS  Talk about your risk of falling.  · Ask your health care provider why you are at  risk for falling. Is it your medicine, illness, tubing placement, or something else?  · Make a plan with your health care provider to keep you safe from falls.  · Ask your health care provider or pharmacist about side effects of your medicines. Some medicines can make you dizzy or affect your coordination.  Ask for help.  · Ask for help before getting out of bed. You may need to press your call button.  · Ask for assistance in getting safely to the toilet.  · Ask for a walker or cane to be put at your bedside. Ask that most of the side rails on your bed be placed up before your health care provider leaves the room.  · Ask family or friends to sit with you.  · Ask for things that are out of your reach, such as your glasses, hearing aids, telephone, bedside table, or call button.  Follow these tips to avoid falling:  · Stay lying or seated, rather than standing, while waiting for help.  · Wear rubber-soled slippers or shoes whenever you walk in the hospital.  · Avoid quick, sudden movements.  ¨ Change positions slowly.  ¨ Sit on the side of your bed before standing.  ¨ Stand up slowly and wait before you start to walk.  · Let your health care provider know if there is a spill on the floor.  · Pay careful attention to the medical equipment, electrical cords, and tubes around you.  · When you need help, use your call button by your bed or in the bathroom. Wait for one of your health care providers to help you.  · If you feel dizzy or unsure of your footing, return to bed and wait for assistance.  · Avoid being distracted by the TV, telephone, or another person in your room.  · Do not lean or support yourself on rolling objects, such as IV poles or bedside tables.     This information is not intended to replace advice given to you by your health care provider. Make sure you discuss any questions you have with your health care provider.     Document Released: 12/15/2001 Document Revised: 01/08/2016 Document Reviewed:  08/25/2013  [employer] Interactive Patient Education ©2016 [employer]       Surgical Site Infections FAQs  What is a Surgical Site Infection (SSI)?  A surgical site infection is an infection that occurs after surgery in the part of the body where the surgery took place. Most patients who have surgery do not develop an infection. However, infections develop in about 1 to 3 out of every 100 patients who have surgery.  Some of the common symptoms of a surgical site infection are:  · Redness and pain around the area where you had surgery  · Drainage of cloudy fluid from your surgical wound  · Fever  Can SSIs be treated?  Yes. Most surgical site infections can be treated with antibiotics. The antibiotic given to you depends on the bacteria (germs) causing the infection. Sometimes patients with SSIs also need another surgery to treat the infection.  What are some of the things that hospitals are doing to prevent SSIs?  To prevent SSIs, doctors, nurses, and other healthcare providers:  · Clean their hands and arms up to their elbows with an antiseptic agent just before the surgery.  · Clean their hands with soap and water or an alcohol-based hand rub before and after caring for each patient.  · May remove some of your hair immediately before your surgery using electric clippers if the hair is in the same area where the procedure will occur. They should not shave you with a razor.  · Wear special hair covers, masks, gowns, and gloves during surgery to keep the surgery area clean.  · Give you antibiotics before your surgery starts. In most cases, you should get antibiotics within 60 minutes before the surgery starts and the antibiotics should be stopped within 24 hours after surgery.  · Clean the skin at the site of your surgery with a special soap that kills germs.  What can I do to help prevent SSIs?  Before your surgery:  · Tell your doctor about other medical problems you may have. Health problems such as allergies,  diabetes, and obesity could affect your surgery and your treatment.  · Quit smoking. Patients who smoke get more infections. Talk to your doctor about how you can quit before your surgery.  · Do not shave near where you will have surgery. Shaving with a razor can irritate your skin and make it easier to develop an infection.  At the time of your surgery:  · Speak up if someone tries to shave you with a razor before surgery. Ask why you need to be shaved and talk with your surgeon if you have any concerns.  · Ask if you will get antibiotics before surgery.  After your surgery:  · Make sure that your healthcare providers clean their hands before examining you, either with soap and water or an alcohol-based hand rub.  · If you do not see your providers clean their hands, please ask them to do so.  · Family and friends who visit you should not touch the surgical wound or dressings.  · Family and friends should clean their hands with soap and water or an alcohol-based hand rub before and after visiting you. If you do not see them clean their hands, ask them to clean their hands.  What do I need to do when I go home from the hospital?  · Before you go home, your doctor or nurse should explain everything you need to know about taking care of your wound. Make sure you understand how to care for your wound before you leave the hospital.  · Always clean your hands before and after caring for your wound.  · Before you go home, make sure you know who to contact if you have questions or problems after you get home.  · If you have any symptoms of an infection, such as redness and pain at the surgery site, drainage, or fever, call your doctor immediately.  If you have additional questions, please ask your doctor or nurse.  Developed and co-sponsored by The Society for Healthcare Epidemiology of [place] (SHEA); Infectious Diseases Society of [place] (IDSA); American Hospital Association; Association for Professionals in Infection  Control and Epidemiology (APIC); Centers for Disease Control and Prevention (CDC); and The Joint Commission.     This information is not intended to replace advice given to you by your health care provider. Make sure you discuss any questions you have with your health care provider.     Document Released: 12/23/2014 Document Revised: 01/08/2016 Document Reviewed: 03/02/2016  [employer] Interactive Patient Education ©2016 [employer]       [facility]  CHG 4% Patient Instruction Sheet    Preparing the Skin Before Surgery  Preparing or “prepping” skin before surgery can reduce the risk of infection at the surgical site. To make the process easier,[facility] has chosen 4% Chlorhexidine Gluconate (CHG) antiseptic solution.   The steps below outline the prepping process and should be carefully followed.                                                                                                                                                      Prep the skin at the following time(s):                                                      We recommend you shower the night before surgery, and again the morning of surgery with the 4% CHG antiseptic solution using half of the bottle and a cloth each time.  Dress in clean clothes/sleepwear after showering.  See instructions below for application.          Do not apply any lotions or moisturizers.       Do not shave the area to be prepped for at least 2 days prior to surgery.    Clipping the hair may be done immediately prior to your surgery at the hospital    if needed.    Directions:  Thoroughly rinse your body with water.  Apply 4% CHG to a cloth and wash skin gently, paying special attention to the operative site.  Rinse again thoroughly.  Once you have begun using this product do not apply anything else to your skin. If itching or redness persists, rinse affected areas and discontinue use.    When using this product:  • Keep out of eyes, ears, and mouth.  • If  solution should contact these areas, rinse out promptly and thoroughly with water.  • For external use only.  • Do not use in genital area, on your face or head.      PATIENT/FAMILY/RESPONSIBLE PARTY VERBALIZES UNDERSTANDING OF ABOVE EDUCATION.  COPY OF PAIN SCALE GIVEN AND REVIEWED WITH VERBALIZED UNDERSTANDING.

## 2017-11-21 ENCOUNTER — APPOINTMENT (OUTPATIENT)
Dept: PREADMISSION TESTING | Facility: HOSPITAL | Age: 42
End: 2017-11-21

## 2017-12-01 ENCOUNTER — APPOINTMENT (OUTPATIENT)
Dept: GENERAL RADIOLOGY | Facility: HOSPITAL | Age: 42
End: 2017-12-01

## 2017-12-01 ENCOUNTER — ANESTHESIA (OUTPATIENT)
Dept: PERIOP | Facility: HOSPITAL | Age: 42
End: 2017-12-01

## 2017-12-01 ENCOUNTER — ANESTHESIA EVENT (OUTPATIENT)
Dept: PERIOP | Facility: HOSPITAL | Age: 42
End: 2017-12-01

## 2017-12-01 ENCOUNTER — HOSPITAL ENCOUNTER (OUTPATIENT)
Facility: HOSPITAL | Age: 42
Discharge: HOME | End: 2017-12-01
Attending: NEUROLOGICAL SURGERY | Admitting: NEUROLOGICAL SURGERY

## 2017-12-01 VITALS
HEART RATE: 87 BPM | RESPIRATION RATE: 16 BRPM | OXYGEN SATURATION: 97 % | DIASTOLIC BLOOD PRESSURE: 75 MMHG | SYSTOLIC BLOOD PRESSURE: 154 MMHG | TEMPERATURE: 98.1 F

## 2017-12-01 DIAGNOSIS — M51.26 DISPLACEMENT OF LUMBAR INTERVERTEBRAL DISC WITHOUT MYELOPATHY: ICD-10-CM

## 2017-12-01 LAB
ABO GROUP BLD: NORMAL
BLD GP AB SCN SERPL QL: NEGATIVE
RH BLD: POSITIVE

## 2017-12-01 PROCEDURE — 63030 LAMOT DCMPRN NRV RT 1 LMBR: CPT | Performed by: NEUROLOGICAL SURGERY

## 2017-12-01 PROCEDURE — 25010000002 ONDANSETRON PER 1 MG: Performed by: NURSE ANESTHETIST, CERTIFIED REGISTERED

## 2017-12-01 PROCEDURE — 63035 LAMOT DCMPRN NRV RT EA ADDL: CPT | Performed by: NEUROLOGICAL SURGERY

## 2017-12-01 PROCEDURE — 25010000003 CEFAZOLIN PER 500 MG: Performed by: NEUROLOGICAL SURGERY

## 2017-12-01 PROCEDURE — 25010000002 HYDROMORPHONE PER 4 MG: Performed by: NURSE ANESTHETIST, CERTIFIED REGISTERED

## 2017-12-01 PROCEDURE — 76000 FLUOROSCOPY <1 HR PHYS/QHP: CPT

## 2017-12-01 PROCEDURE — 86900 BLOOD TYPING SEROLOGIC ABO: CPT | Performed by: NEUROLOGICAL SURGERY

## 2017-12-01 PROCEDURE — 86850 RBC ANTIBODY SCREEN: CPT | Performed by: NEUROLOGICAL SURGERY

## 2017-12-01 PROCEDURE — 88304 TISSUE EXAM BY PATHOLOGIST: CPT | Performed by: NEUROLOGICAL SURGERY

## 2017-12-01 PROCEDURE — 25010000002 MORPHINE PER 10 MG

## 2017-12-01 PROCEDURE — 25010000002 METHYLPREDNISOLONE PER 40 MG

## 2017-12-01 PROCEDURE — 25010000002 MORPHINE SULFATE (PF) 2 MG/ML SOLUTION: Performed by: ANESTHESIOLOGY

## 2017-12-01 PROCEDURE — 25010000002 HYDROMORPHONE PER 4 MG: Performed by: ANESTHESIOLOGY

## 2017-12-01 PROCEDURE — 25010000002 PROPOFOL 10 MG/ML EMULSION: Performed by: NURSE ANESTHETIST, CERTIFIED REGISTERED

## 2017-12-01 PROCEDURE — 88311 DECALCIFY TISSUE: CPT | Performed by: NEUROLOGICAL SURGERY

## 2017-12-01 PROCEDURE — 25010000002 DEXAMETHASONE PER 1 MG: Performed by: NURSE ANESTHETIST, CERTIFIED REGISTERED

## 2017-12-01 PROCEDURE — 86901 BLOOD TYPING SEROLOGIC RH(D): CPT | Performed by: NEUROLOGICAL SURGERY

## 2017-12-01 PROCEDURE — 72100 X-RAY EXAM L-S SPINE 2/3 VWS: CPT

## 2017-12-01 RX ORDER — HYDROMORPHONE HCL 110MG/55ML
PATIENT CONTROLLED ANALGESIA SYRINGE INTRAVENOUS AS NEEDED
Status: DISCONTINUED | OUTPATIENT
Start: 2017-12-01 | End: 2017-12-01 | Stop reason: SURG

## 2017-12-01 RX ORDER — HYDROCODONE BITARTRATE AND ACETAMINOPHEN 10; 325 MG/1; MG/1
1 TABLET ORAL EVERY 4 HOURS PRN
Qty: 180 TABLET | Refills: 0 | Status: SHIPPED | OUTPATIENT
Start: 2017-12-01 | End: 2018-01-17 | Stop reason: SDUPTHER

## 2017-12-01 RX ORDER — BUPIVACAINE HYDROCHLORIDE AND EPINEPHRINE 5; 5 MG/ML; UG/ML
INJECTION, SOLUTION EPIDURAL; INTRACAUDAL; PERINEURAL AS NEEDED
Status: DISCONTINUED | OUTPATIENT
Start: 2017-12-01 | End: 2017-12-01 | Stop reason: HOSPADM

## 2017-12-01 RX ORDER — MIDAZOLAM HYDROCHLORIDE 1 MG/ML
1 INJECTION INTRAMUSCULAR; INTRAVENOUS
Status: DISCONTINUED | OUTPATIENT
Start: 2017-12-01 | End: 2017-12-01 | Stop reason: HOSPADM

## 2017-12-01 RX ORDER — MIDAZOLAM HYDROCHLORIDE 1 MG/ML
2 INJECTION INTRAMUSCULAR; INTRAVENOUS
Status: DISCONTINUED | OUTPATIENT
Start: 2017-12-01 | End: 2017-12-01 | Stop reason: HOSPADM

## 2017-12-01 RX ORDER — ONDANSETRON 2 MG/ML
INJECTION INTRAMUSCULAR; INTRAVENOUS AS NEEDED
Status: DISCONTINUED | OUTPATIENT
Start: 2017-12-01 | End: 2017-12-01 | Stop reason: SURG

## 2017-12-01 RX ORDER — MINERAL OIL
OIL (ML) MISCELLANEOUS AS NEEDED
Status: DISCONTINUED | OUTPATIENT
Start: 2017-12-01 | End: 2017-12-01 | Stop reason: HOSPADM

## 2017-12-01 RX ORDER — LABETALOL HYDROCHLORIDE 5 MG/ML
5 INJECTION, SOLUTION INTRAVENOUS
Status: DISCONTINUED | OUTPATIENT
Start: 2017-12-01 | End: 2017-12-01 | Stop reason: HOSPADM

## 2017-12-01 RX ORDER — ROCURONIUM BROMIDE 10 MG/ML
INJECTION, SOLUTION INTRAVENOUS AS NEEDED
Status: DISCONTINUED | OUTPATIENT
Start: 2017-12-01 | End: 2017-12-01 | Stop reason: SURG

## 2017-12-01 RX ORDER — SODIUM CHLORIDE 0.9 % (FLUSH) 0.9 %
3 SYRINGE (ML) INJECTION AS NEEDED
Status: DISCONTINUED | OUTPATIENT
Start: 2017-12-01 | End: 2017-12-01 | Stop reason: HOSPADM

## 2017-12-01 RX ORDER — IPRATROPIUM BROMIDE AND ALBUTEROL SULFATE 2.5; .5 MG/3ML; MG/3ML
3 SOLUTION RESPIRATORY (INHALATION) ONCE AS NEEDED
Status: DISCONTINUED | OUTPATIENT
Start: 2017-12-01 | End: 2017-12-01 | Stop reason: HOSPADM

## 2017-12-01 RX ORDER — DIPHENHYDRAMINE HYDROCHLORIDE 50 MG/ML
12.5 INJECTION INTRAMUSCULAR; INTRAVENOUS
Status: DISCONTINUED | OUTPATIENT
Start: 2017-12-01 | End: 2017-12-01 | Stop reason: HOSPADM

## 2017-12-01 RX ORDER — MAGNESIUM HYDROXIDE 1200 MG/15ML
LIQUID ORAL AS NEEDED
Status: DISCONTINUED | OUTPATIENT
Start: 2017-12-01 | End: 2017-12-01 | Stop reason: HOSPADM

## 2017-12-01 RX ORDER — HYDRALAZINE HYDROCHLORIDE 20 MG/ML
5 INJECTION INTRAMUSCULAR; INTRAVENOUS
Status: DISCONTINUED | OUTPATIENT
Start: 2017-12-01 | End: 2017-12-01 | Stop reason: HOSPADM

## 2017-12-01 RX ORDER — SODIUM CHLORIDE 9 MG/ML
INJECTION, SOLUTION INTRAVENOUS AS NEEDED
Status: DISCONTINUED | OUTPATIENT
Start: 2017-12-01 | End: 2017-12-01 | Stop reason: HOSPADM

## 2017-12-01 RX ORDER — PROPOFOL 10 MG/ML
VIAL (ML) INTRAVENOUS AS NEEDED
Status: DISCONTINUED | OUTPATIENT
Start: 2017-12-01 | End: 2017-12-01 | Stop reason: SURG

## 2017-12-01 RX ORDER — DEXTROSE MONOHYDRATE 25 G/50ML
12.5 INJECTION, SOLUTION INTRAVENOUS AS NEEDED
Status: DISCONTINUED | OUTPATIENT
Start: 2017-12-01 | End: 2017-12-01 | Stop reason: HOSPADM

## 2017-12-01 RX ORDER — ONDANSETRON 2 MG/ML
4 INJECTION INTRAMUSCULAR; INTRAVENOUS ONCE AS NEEDED
Status: DISCONTINUED | OUTPATIENT
Start: 2017-12-01 | End: 2017-12-01 | Stop reason: HOSPADM

## 2017-12-01 RX ORDER — FENTANYL CITRATE 50 UG/ML
25 INJECTION, SOLUTION INTRAMUSCULAR; INTRAVENOUS
Status: DISCONTINUED | OUTPATIENT
Start: 2017-12-01 | End: 2017-12-01 | Stop reason: HOSPADM

## 2017-12-01 RX ORDER — MORPHINE SULFATE 2 MG/ML
2 INJECTION, SOLUTION INTRAMUSCULAR; INTRAVENOUS
Status: COMPLETED | OUTPATIENT
Start: 2017-12-01 | End: 2017-12-01

## 2017-12-01 RX ORDER — SODIUM CHLORIDE, SODIUM LACTATE, POTASSIUM CHLORIDE, CALCIUM CHLORIDE 600; 310; 30; 20 MG/100ML; MG/100ML; MG/100ML; MG/100ML
1000 INJECTION, SOLUTION INTRAVENOUS CONTINUOUS
Status: DISCONTINUED | OUTPATIENT
Start: 2017-12-01 | End: 2017-12-01 | Stop reason: HOSPADM

## 2017-12-01 RX ORDER — SODIUM CHLORIDE 0.9 % (FLUSH) 0.9 %
1-10 SYRINGE (ML) INJECTION AS NEEDED
Status: DISCONTINUED | OUTPATIENT
Start: 2017-12-01 | End: 2017-12-01 | Stop reason: HOSPADM

## 2017-12-01 RX ORDER — DEXAMETHASONE SODIUM PHOSPHATE 4 MG/ML
INJECTION, SOLUTION INTRA-ARTICULAR; INTRALESIONAL; INTRAMUSCULAR; INTRAVENOUS; SOFT TISSUE AS NEEDED
Status: DISCONTINUED | OUTPATIENT
Start: 2017-12-01 | End: 2017-12-01 | Stop reason: SURG

## 2017-12-01 RX ORDER — MEPERIDINE HYDROCHLORIDE 25 MG/ML
12.5 INJECTION INTRAMUSCULAR; INTRAVENOUS; SUBCUTANEOUS
Status: COMPLETED | OUTPATIENT
Start: 2017-12-01 | End: 2017-12-01

## 2017-12-01 RX ORDER — NALOXONE HCL 0.4 MG/ML
0.4 VIAL (ML) INJECTION AS NEEDED
Status: DISCONTINUED | OUTPATIENT
Start: 2017-12-01 | End: 2017-12-01 | Stop reason: HOSPADM

## 2017-12-01 RX ORDER — SUFENTANIL CITRATE 50 UG/ML
INJECTION EPIDURAL; INTRAVENOUS AS NEEDED
Status: DISCONTINUED | OUTPATIENT
Start: 2017-12-01 | End: 2017-12-01 | Stop reason: SURG

## 2017-12-01 RX ORDER — SODIUM CHLORIDE, SODIUM LACTATE, POTASSIUM CHLORIDE, CALCIUM CHLORIDE 600; 310; 30; 20 MG/100ML; MG/100ML; MG/100ML; MG/100ML
9 INJECTION, SOLUTION INTRAVENOUS CONTINUOUS
Status: DISCONTINUED | OUTPATIENT
Start: 2017-12-01 | End: 2017-12-01 | Stop reason: HOSPADM

## 2017-12-01 RX ADMIN — MORPHINE SULFATE 2 MG: 2 INJECTION, SOLUTION INTRAMUSCULAR; INTRAVENOUS at 13:30

## 2017-12-01 RX ADMIN — MORPHINE SULFATE 2 MG: 2 INJECTION, SOLUTION INTRAMUSCULAR; INTRAVENOUS at 13:50

## 2017-12-01 RX ADMIN — MEPERIDINE HYDROCHLORIDE 12.5 MG: 25 INJECTION INTRAMUSCULAR; INTRAVENOUS; SUBCUTANEOUS at 13:16

## 2017-12-01 RX ADMIN — SODIUM CHLORIDE, POTASSIUM CHLORIDE, SODIUM LACTATE AND CALCIUM CHLORIDE 1000 ML: 600; 310; 30; 20 INJECTION, SOLUTION INTRAVENOUS at 07:28

## 2017-12-01 RX ADMIN — DEXAMETHASONE SODIUM PHOSPHATE 4 MG: 4 INJECTION, SOLUTION INTRAMUSCULAR; INTRAVENOUS at 11:52

## 2017-12-01 RX ADMIN — SUFENTANIL CITRATE 20 MCG: 50 INJECTION, SOLUTION EPIDURAL; INTRAVENOUS at 10:11

## 2017-12-01 RX ADMIN — ONDANSETRON HYDROCHLORIDE 4 MG: 2 SOLUTION INTRAMUSCULAR; INTRAVENOUS at 11:52

## 2017-12-01 RX ADMIN — HYDROMORPHONE HYDROCHLORIDE 1 MG: 1 INJECTION, SOLUTION INTRAMUSCULAR; INTRAVENOUS; SUBCUTANEOUS at 13:15

## 2017-12-01 RX ADMIN — ROCURONIUM BROMIDE 30 MG: 10 INJECTION INTRAVENOUS at 10:12

## 2017-12-01 RX ADMIN — SUFENTANIL CITRATE 30 MCG: 50 INJECTION, SOLUTION EPIDURAL; INTRAVENOUS at 10:22

## 2017-12-01 RX ADMIN — LIDOCAINE HYDROCHLORIDE 0.5 ML: 10 INJECTION, SOLUTION EPIDURAL; INFILTRATION; INTRACAUDAL; PERINEURAL at 07:28

## 2017-12-01 RX ADMIN — HYDROMORPHONE HYDROCHLORIDE 1 MG: 1 INJECTION, SOLUTION INTRAMUSCULAR; INTRAVENOUS; SUBCUTANEOUS at 13:05

## 2017-12-01 RX ADMIN — CEFAZOLIN SODIUM 2 G: 2 SOLUTION INTRAVENOUS at 10:23

## 2017-12-01 RX ADMIN — PROPOFOL 200 MG: 10 INJECTION, EMULSION INTRAVENOUS at 10:12

## 2017-12-01 RX ADMIN — MORPHINE SULFATE 2 MG: 2 INJECTION, SOLUTION INTRAMUSCULAR; INTRAVENOUS at 13:45

## 2017-12-01 RX ADMIN — MORPHINE SULFATE 2 MG: 2 INJECTION, SOLUTION INTRAMUSCULAR; INTRAVENOUS at 13:40

## 2017-12-01 RX ADMIN — HYDROMORPHONE HYDROCHLORIDE 1 MG: 2 INJECTION, SOLUTION INTRAMUSCULAR; INTRAVENOUS; SUBCUTANEOUS at 12:28

## 2017-12-01 RX ADMIN — HYDROMORPHONE HYDROCHLORIDE 0.5 MG: 2 INJECTION, SOLUTION INTRAMUSCULAR; INTRAVENOUS; SUBCUTANEOUS at 12:12

## 2017-12-01 RX ADMIN — MORPHINE SULFATE 2 MG: 2 INJECTION, SOLUTION INTRAMUSCULAR; INTRAVENOUS at 13:35

## 2017-12-01 RX ADMIN — SODIUM CHLORIDE, POTASSIUM CHLORIDE, SODIUM LACTATE AND CALCIUM CHLORIDE 1000 ML: 600; 310; 30; 20 INJECTION, SOLUTION INTRAVENOUS at 14:15

## 2017-12-01 RX ADMIN — HYDROMORPHONE HYDROCHLORIDE 0.5 MG: 2 INJECTION, SOLUTION INTRAMUSCULAR; INTRAVENOUS; SUBCUTANEOUS at 12:21

## 2017-12-01 RX ADMIN — SODIUM CHLORIDE, POTASSIUM CHLORIDE, SODIUM LACTATE AND CALCIUM CHLORIDE: 600; 310; 30; 20 INJECTION, SOLUTION INTRAVENOUS at 11:56

## 2017-12-01 RX ADMIN — MEPERIDINE HYDROCHLORIDE 12.5 MG: 25 INJECTION INTRAMUSCULAR; INTRAVENOUS; SUBCUTANEOUS at 13:20

## 2017-12-01 NOTE — DISCHARGE INSTRUCTIONS

## 2017-12-01 NOTE — PLAN OF CARE
Problem: Patient Care Overview (Adult)  Goal: Plan of Care Review  Outcome: Outcome(s) achieved Date Met:  12/01/17 12/01/17 5208   Coping/Psychosocial Response Interventions   Plan Of Care Reviewed With patient;spouse   Patient Care Overview   Progress improving   Outcome Evaluation   Outcome Summary/Follow up Plan Patient meets discharge criteria and is ready for discharge. His pain is a 7/10, but he did not want to wait for me to call the MD and get an order. He wants to go get his Rx filled and go home.       Goal: Adult Individualization and Mutuality  Outcome: Outcome(s) achieved Date Met:  12/01/17  Goal: Discharge Needs Assessment  Outcome: Outcome(s) achieved Date Met:  12/01/17    Problem: Perioperative Period (Adult)  Goal: Signs and Symptoms of Listed Potential Problems Will be Absent or Manageable (Perioperative Period)  Outcome: Outcome(s) achieved Date Met:  12/01/17

## 2017-12-01 NOTE — PLAN OF CARE
Problem: Perioperative Period (Adult)  Goal: Signs and Symptoms of Listed Potential Problems Will be Absent or Manageable (Perioperative Period)  Outcome: Ongoing (interventions implemented as appropriate)    12/01/17 7811   Perioperative Period   Problems Assessed (Perioperative Period) pain;hypothermia;hypoxia/hypoxemia;perioperative injury;situational response   Problems Present (Perioperative Period) situational response;pain

## 2017-12-01 NOTE — OP NOTE
Procedure Note  Preop Diagnosis: Displacement of lumbar intervertebral disc without myelopathy [M51.26]    Post-Op Diagnosis Codes:     * Displacement of lumbar intervertebral disc without myelopathy [M51.26]     Procedure Name: Right L5-S1 hemilaminotomy foraminotomy, discectomy  Left L4-L5 hemilaminotomy foraminotomy, discectomy  Use of the operative microscope    Indications:  A MRI of the lumbar spine revealed findings of the disks at L4 5 and L5-S1. The patient now presents for discectomy after discussing therapeutic alternatives.          Surgeon: Favio Castillo MD     Assistants:     Anesthesia: General endotracheal anesthesia    ASA Class: 3    Procedure Details   After obtaining informed consent, having the risks and benefits of the procedure explained including but not limited to infection, bleeding, paralysis, spinal fluid leak, bowel bladder incontinence, stroke, coma, and death.  The patient was brought to the operating he was given general anesthesia via an endotracheal tube.  His flipped prone onto a Oscar axis table.  Portable fluoroscopy was used to localize levels of L4-L5 and S1.  A preplanned midline incision was marked with indelible marker.  The patient was prepped and draped in standard sterile fashion.  The incision was a pleasure and Marcaine and epinephrine.  A 10 blade scalpel edges make incisions the dermis and epidermis.  Bovie cautery was used to extend the incision down to the subjacent soft tissues to level the spinous processes.  Then at L5 and S1 the mass which are connective tissue were dissected off the right side of the spinous process and lamina of L5.  A Yaakov instrument was placed under the lamina.  Class B confirm this was a correct level.  The Crawford retractor system was then placed into the wound.  At this point the operative microscope was brought in.  A 3 mm round cutting bur was used to drill laminotomy on the right.  The laminotomy was then widened using a  combination of 2 mm and 3 mm Kerrisons.  Ligamentum flavum was then resected using 2 mm and 3 mm Kerrisons exposing the exiting nerve root at that level.  The nerve root was retracted medially.  Disc space was identified.  A 15 blade scalpel was used to make incisions in the annulus of the disc space and then the discectomy was done using a series of micropituitary instruments and down pushing curettes.  Once we are satisfied that we had decompressed the nerves on the right the retractor system was removed Bovie cautery was used to dissect the connective tissue and musculature off the lamina and spinous process of L4 on the left.  A Yaakov instrument was placed under the lamina of L4.  Fluoroscopy confirm this was a correct level.  The [employer] retractor system was then placed at the edge of the lamina.  A 3 mm round cutting bur was used to drill laminotomy on the left at L4.  The laminotomy was then widened using a mentation of 2 mm 3 mm Kerrisons.  The ligamentum flavum was then resected using a 2 mm and 3 mm Kerrisons exposing the exiting nerve root at that level.  The nerve root was retracted medially.  The disc space was identified.  A 15 blade scalpel edges make incision in the annulus of the disc space and then the discectomy was conducted using a series of micropituitary instrument and down pushing curettes.  Very satisfied that we had adequately decompressed the nerve root on the left at L4 5 the wound was copiously irrigated with an bike solution.  It was inspected for hemostasis.  All pieces of Gelfoam soaked in Solu-Medrol and Duramorph were placed over the exiting nerve roots on the left and the right.  The deep tissues were closed using a series of inverted interrupted 0 Vicryl sutures.  The subcutaneous and soft tissues were closed using a series of inverted interrupted 2-0 Vicryl sutures.  The skin was closed using a running 4 Monocryl subcuticular.  All sponge needle and instrument counts were  correct at the end of the procedure.  The patient was extubated in stable condition returned recovery with about 20 mL of blood loss.    Findings:  Disc herniations at L4 5 and L5-S1]    Estimated Blood Loss:  20           Drains: None           Total IV Fluids: ml           Specimens:   ID Type Source Tests Collected by Time Destination   A : Left Disc L4-5 Disc Spine, Lumbar TISSUE EXAM Favio Castillo MD 12/1/2017 1111    B : Right Disc L5/S1 Disc Spine, Lumbar TISSUE EXAM Favio Castillo MD 12/1/2017 1203               Implants: * No implants in log *           Complications:  None           Disposition: PACU - hemodynamically stable.           Condition: stable        Favio Castillo MD

## 2017-12-01 NOTE — ANESTHESIA PROCEDURE NOTES
Airway  Urgency: elective    Date/Time: 12/1/2017 10:26 AM  Airway not difficult    General Information and Staff    Patient location during procedure: OR  CRNA: CHARLENE RUBY    Indications and Patient Condition  Indications for airway management: airway protection    Preoxygenated: yes  MILS maintained throughout  Mask difficulty assessment: 1 - vent by mask    Final Airway Details  Final airway type: endotracheal airway      Successful airway: ETT  Cuffed: yes   Successful intubation technique: direct laryngoscopy  Endotracheal tube insertion site: oral  Blade: Ayers  Blade size: #2  ETT size: 7.5 mm  Cormack-Lehane Classification: grade IIa - partial view of glottis  Placement verified by: chest auscultation and capnometry   Cuff volume (mL): 5  Measured from: gums  ETT to gums (cm): 22  Number of attempts at approach: 1    Additional Comments  Atraumatic dl

## 2017-12-01 NOTE — ANESTHESIA PREPROCEDURE EVALUATION
Anesthesia Evaluation     Patient summary reviewed   no history of anesthetic complications:  NPO Solid Status: > 8 hours       Airway   Mallampati: II  TM distance: >3 FB  Neck ROM: full  Dental      Pulmonary    (-) asthma, sleep apnea, not a smoker, pulmonary embolism  Cardiovascular   Exercise tolerance: excellent (>7 METS)    ECG reviewed    (+) hypertension,   (-) pacemaker, past MI, angina, cardiac stents, DVT      Neuro/Psych  (-) seizures, TIA, CVA  GI/Hepatic/Renal/Endo    (+) morbid obesity, hypothyroidism,   (-) GERD, liver disease, no renal disease, diabetes    Musculoskeletal     Abdominal    Substance History      OB/GYN          Other   (+) blood dyscrasia (factor 5 leiden.  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

## 2017-12-01 NOTE — ANESTHESIA POSTPROCEDURE EVALUATION
Patient: Alin Richard    Procedure Summary     Date Anesthesia Start Anesthesia Stop Room / Location    12/01/17 1009 1255  [facility] OR 07 / [facility] OR       Procedure Diagnosis Surgeon Provider    LUMBAR Micro DISCECTOMY LEFT L4-5 and RIGHT L5-S1 (Bilateral Spine Lumbar) Displacement of lumbar intervertebral disc without myelopathy  (Displacement of lumbar intervertebral disc without myelopathy [M51.26]) MD David Warner CRNA          Anesthesia Type: general  Last vitals  BP   154/75 (12/01/17 1455)   Temp   98.1 °F (36.7 °C) (12/01/17 1410)   Pulse   87 (12/01/17 1455)   Resp   16 (12/01/17 1455)     SpO2   97 % (12/01/17 1455)     Post Anesthesia Care and Evaluation      Comments: Patient d/c from PACU prior to anes eval based on Charly score.  Please see RN notes for details of d/c criteria.

## 2017-12-01 NOTE — PLAN OF CARE
Problem: Patient Care Overview (Adult)  Goal: Plan of Care Review  Outcome: Ongoing (interventions implemented as appropriate)    12/01/17 1003   Coping/Psychosocial Response Interventions   Plan Of Care Reviewed With patient   Patient Care Overview   Progress no change   Outcome Evaluation   Outcome Summary/Follow up Plan no changes in the preop holding phase

## 2017-12-04 LAB
CYTO UR: NORMAL
LAB AP CASE REPORT: NORMAL
Lab: NORMAL
PATH REPORT.FINAL DX SPEC: NORMAL
PATH REPORT.GROSS SPEC: NORMAL

## 2017-12-13 ENCOUNTER — TELEPHONE (OUTPATIENT)
Dept: NEUROSURGERY | Facility: CLINIC | Age: 42
End: 2017-12-13

## 2017-12-13 NOTE — TELEPHONE ENCOUNTER
Patient's wife called stating patient has taken all of his pain med that was given by Dr Stallworth on 11/21/17 and per [facility] it is too soon to fill our script.  She waited until yesterday to see if they would fill and they say still too soon.  I did add up the scripts and he has taken more than was prescribed by Dr Stallworth and Dr Castillo but per his wife he is completely out of pain medication and needs this filled.  I called [facility] and told them to go ahead and fill early.  I did stress to the wife that the patient MUST take the medication as prescribed and if he takes more than prescribed and runs out early again we will NOT give the early approval - she expressed understanding.    césar shook CMA

## 2017-12-26 ENCOUNTER — OFFICE VISIT (OUTPATIENT)
Dept: NEUROSURGERY | Facility: CLINIC | Age: 42
End: 2017-12-26

## 2017-12-26 VITALS
BODY MASS INDEX: 40.56 KG/M2 | HEIGHT: 73 IN | DIASTOLIC BLOOD PRESSURE: 88 MMHG | SYSTOLIC BLOOD PRESSURE: 130 MMHG | WEIGHT: 306 LBS

## 2017-12-26 DIAGNOSIS — Z78.9 NON-SMOKER: ICD-10-CM

## 2017-12-26 DIAGNOSIS — M51.26 HERNIATED INTERVERTEBRAL DISC OF LUMBAR SPINE: ICD-10-CM

## 2017-12-26 DIAGNOSIS — M51.26 DISPLACEMENT OF LUMBAR INTERVERTEBRAL DISC WITHOUT MYELOPATHY: Primary | ICD-10-CM

## 2017-12-26 DIAGNOSIS — M51.37 DEGENERATION OF LUMBAR OR LUMBOSACRAL INTERVERTEBRAL DISC: ICD-10-CM

## 2017-12-26 PROCEDURE — 99024 POSTOP FOLLOW-UP VISIT: CPT | Performed by: NURSE PRACTITIONER

## 2017-12-26 RX ORDER — METHYLPREDNISOLONE 4 MG/1
TABLET ORAL
Qty: 21 EACH | Refills: 0 | Status: SHIPPED | OUTPATIENT
Start: 2017-12-26 | End: 2018-02-15

## 2017-12-26 NOTE — PROGRESS NOTES
"    Chief complaint:   Chief Complaint   Patient presents with   • Post-op     Alin returns today for his first post op visit, he is still having some issues but other wise he is doing well.         Subjective     HPI: This is a 42-year-old male gentleman who went to the operating room on 12/01/2017 for aRight L5-S1 hemilaminotomy foraminotomy, discectomy Left L4-L5 hemilaminotomy foraminotomy, discectomy.  He is here in follow-up today.  He says that he did well after the surgery and he said that his lower extremity pain to completely resolve.  He said that about a week and a half ago he started having return of his lower extremity symptoms.  On the right side it would go down into his buttocks on the left side of her go down to about his mid thigh on the lateral aspect of his thigh.  He says it does feel different than what it did before surgery and does feel like its deeper and also feels that it is not as bad as what it was prior to surgery.  He says that he has been trying to adhere to His activity restrictions.  Denies any bowel or bladder incontinence.  Overall he does feel like he has made improvements since surgery.    Review of Systems   Musculoskeletal: Positive for back pain.   Neurological: Negative.          Objective      Vital Signs  /88 (BP Location: Right arm, Patient Position: Sitting)  Ht 185.4 cm (73\")  Wt (!) 139 kg (306 lb)  BMI 40.37 kg/m2    Physical Exam   Constitutional: He is oriented to person, place, and time. He appears well-developed and well-nourished.   HENT:   Head: Normocephalic.   Eyes: EOM are normal. Pupils are equal, round, and reactive to light.   Neck: Normal range of motion.   Pulmonary/Chest: Effort normal.   Musculoskeletal: Normal range of motion.        Lumbar back: He exhibits pain.   Neurological: He is alert and oriented to person, place, and time. He has normal strength and normal reflexes. No cranial nerve deficit or sensory deficit. Gait normal. GCS eye " subscore is 4. GCS verbal subscore is 5. GCS motor subscore is 6.   Skin: Skin is warm.   Incision clean dry and intact   Psychiatric: He has a normal mood and affect. His speech is normal and behavior is normal. Thought content normal.       Results Review: No new imaging          Assessment/Plan: At this point the patient can continue with his activities restrictions.  We will follow-up again with him in 6 weeks which time a see Dr. Castillo.  We will start him on a Medrol Dosepak to see this will help with the lingering symptoms that he does have.  Should he continue to have symptoms can consider a possible new MRI to see if there is been any reherniation.  BMI shows that is very overweight.  BMI chart was given the patient.  He is a         Alin was seen today for post-op.    Diagnoses and all orders for this visit:    Displacement of lumbar intervertebral disc without myelopathy    Herniated intervertebral disc of lumbar spine    Degeneration of lumbar or lumbosacral intervertebral disc    BMI 38.0-38.9,adult    Non-smoker    Other orders  -     MethylPREDNISolone (MEDROL, ERASTO,) 4 MG tablet; Take as directed on package instructions.      I discussed the patients findings and my recommendations with patient  Huber Hyde, APRN  12/26/17  10:17 AM

## 2018-01-17 RX ORDER — HYDROCODONE BITARTRATE AND ACETAMINOPHEN 10; 325 MG/1; MG/1
1 TABLET ORAL EVERY 6 HOURS PRN
Qty: 120 TABLET | Refills: 0 | Status: SHIPPED | OUTPATIENT
Start: 2018-01-17 | End: 2018-02-15 | Stop reason: SDUPTHER

## 2018-01-30 ENCOUNTER — DOCUMENTATION (OUTPATIENT)
Dept: PHYSICAL THERAPY | Facility: HOSPITAL | Age: 43
End: 2018-01-30

## 2018-01-30 DIAGNOSIS — M51.26 DISPLACEMENT OF LUMBAR INTERVERTEBRAL DISC WITHOUT MYELOPATHY: Primary | ICD-10-CM

## 2018-02-15 ENCOUNTER — OFFICE VISIT (OUTPATIENT)
Dept: NEUROSURGERY | Facility: CLINIC | Age: 43
End: 2018-02-15

## 2018-02-15 VITALS
SYSTOLIC BLOOD PRESSURE: 142 MMHG | WEIGHT: 292 LBS | DIASTOLIC BLOOD PRESSURE: 82 MMHG | BODY MASS INDEX: 38.7 KG/M2 | HEIGHT: 73 IN

## 2018-02-15 DIAGNOSIS — M51.26 DISPLACEMENT OF LUMBAR INTERVERTEBRAL DISC WITHOUT MYELOPATHY: Primary | ICD-10-CM

## 2018-02-15 DIAGNOSIS — Z78.9 NON-SMOKER: ICD-10-CM

## 2018-02-15 DIAGNOSIS — M51.37 DEGENERATION OF LUMBAR OR LUMBOSACRAL INTERVERTEBRAL DISC: ICD-10-CM

## 2018-02-15 PROCEDURE — 99024 POSTOP FOLLOW-UP VISIT: CPT | Performed by: NEUROLOGICAL SURGERY

## 2018-02-15 RX ORDER — TIZANIDINE 4 MG/1
TABLET ORAL
Refills: 1 | COMMUNITY
Start: 2017-12-03 | End: 2021-06-08

## 2018-02-15 RX ORDER — HYDROCODONE BITARTRATE AND ACETAMINOPHEN 10; 325 MG/1; MG/1
1 TABLET ORAL 2 TIMES DAILY
Qty: 60 TABLET | Refills: 0 | Status: SHIPPED | OUTPATIENT
Start: 2018-02-15 | End: 2019-05-09

## 2018-02-15 NOTE — PROGRESS NOTES
"SUBJECTIVE:  Patient ID: Alin Richard is a 42 y.o. male is here today for follow-up.    Chief Complaint:back  Pain   Chief Complaint   Patient presents with   • BACK & LEG PAIN     patient is s/p L45, 5/1 hemilaminotomy/foraminotomy, discectomy on 12/1/2017; today he states he is slowly getting better but it is \"very, very slow\", \"I am having alot of back pain at night after doing stuff all day\".       HPI  41 yo male s/p L45, L5S1 micro discectomy in December of 2017. He States that his leg pain has gone but he does have back pain and hip pain.  He feels that it in general is improving as he still is taking Norco 10 mg every 6 hours.  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  He stated that he has gone up and down ladders.  After a full day of activities he says he does have a lot of back pain.  He is done no physical therapy.  We have referred him to outpatient weight loss program which he says he cannot afford to participate in.  He has not returned to work as a  as of yet.    The following portions of the patient's history were reviewed and updated as appropriate: allergies, current medications, past family history, past medical history, past social history, past surgical history and problem list.    OBJECTIVE:    Review of Systems   Musculoskeletal: Positive for back pain.   All other systems reviewed and are negative.         Physical Exam   Constitutional: He is oriented to person, place, and time. He appears well-developed and well-nourished.   HENT:   Head: Normocephalic and atraumatic.   Right Ear: Hearing normal.   Left Ear: Hearing normal.   Eyes: EOM are normal. Pupils are equal, round, and reactive to light.   Neck: Normal range of motion.   Neurological: He is alert and oriented to person, place, and time. He has normal strength and normal reflexes. No cranial nerve deficit or sensory deficit. He displays a negative " Romberg sign. GCS eye subscore is 4. GCS verbal subscore is 5. GCS motor subscore is 6. He displays no Babinski's sign on the right side. He displays no Babinski's sign on the left side.   Psychiatric: His speech is normal. Judgment normal. Cognition and memory are normal.       Neurologic Exam     Mental Status   Oriented to person, place, and time.   Speech: speech is normal     Cranial Nerves     CN III, IV, VI   Pupils are equal, round, and reactive to light.  Extraocular motions are normal.     Motor Exam     Strength   Strength 5/5 throughout.       Independent Review of Radiographic Studies:       ASSESSMENT/PLAN:  The patient did have 2 herniated disks in his back and he is left with low back pain.  At this point I explained to  him very clearly very patiently that the next logical steps in his care would be a work hardening physical therapy program to see if he can get back to work coupled with a aggressive weight loss program.  He is approximately 100 pounds overweight.  He expressed a lack of desire to participate in a physical therapy program.  He expressed a lack of ability to pay for weight loss program.  We are going to reduce his pain medication by half and refer him to a pain management specialist.      1. Displacement of lumbar intervertebral disc without myelopathy    2. Degeneration of lumbar or lumbosacral intervertebral disc    3. BMI 38.0-38.9,adult    4. Non-smoker            Return in about 6 weeks (around 3/29/2018) for follow up w/SUKUMAR.      Favio Castillo MD

## 2018-02-15 NOTE — PATIENT INSTRUCTIONS

## 2018-02-19 DIAGNOSIS — M51.26 HERNIATED INTERVERTEBRAL DISC OF LUMBAR SPINE: ICD-10-CM

## 2018-02-19 DIAGNOSIS — M51.37 DEGENERATION OF LUMBAR OR LUMBOSACRAL INTERVERTEBRAL DISC: ICD-10-CM

## 2018-02-19 DIAGNOSIS — M51.26 DISPLACEMENT OF LUMBAR INTERVERTEBRAL DISC WITHOUT MYELOPATHY: Primary | ICD-10-CM

## 2018-02-19 DIAGNOSIS — M54.16 LUMBAR RADICULOPATHY: ICD-10-CM

## 2018-02-22 ENCOUNTER — HOSPITAL ENCOUNTER (OUTPATIENT)
Dept: PHYSICAL THERAPY | Facility: HOSPITAL | Age: 43
Setting detail: THERAPIES SERIES
Discharge: HOME | End: 2018-02-22

## 2018-02-22 DIAGNOSIS — M51.26 DISPLACEMENT OF LUMBAR INTERVERTEBRAL DISC WITHOUT MYELOPATHY: Primary | ICD-10-CM

## 2018-02-22 DIAGNOSIS — M54.16 LUMBAR RADICULOPATHY: ICD-10-CM

## 2018-02-22 DIAGNOSIS — M51.26 HERNIATED INTERVERTEBRAL DISC OF LUMBAR SPINE: ICD-10-CM

## 2018-02-22 DIAGNOSIS — M51.37 DEGENERATION OF LUMBAR OR LUMBOSACRAL INTERVERTEBRAL DISC: ICD-10-CM

## 2018-02-22 DIAGNOSIS — M51.37 DEGENERATION OF LUMBOSACRAL INTERVERTEBRAL DISC: ICD-10-CM

## 2018-02-22 PROCEDURE — 97161 PT EVAL LOW COMPLEX 20 MIN: CPT

## 2018-02-22 NOTE — THERAPY EVALUATION
"    Outpatient Physical Therapy Ortho Initial Evaluation  [facility]     Patient Name: Alin Richard  : 1975  MRN: [id number]  Today's Date: 2018      Visit Date: 2018    Patient Active Problem List   Diagnosis   • Displacement of lumbar intervertebral disc without myelopathy   • BMI 38.0-38.9,adult   • Non-smoker   • Renal infarction   • Hypertension   • Herniated intervertebral disc of lumbar spine   • Disease of thyroid gland   • PFO (patent foramen ovale)   • Degeneration of lumbar or lumbosacral intervertebral disc   • Lumbar radiculopathy        Past Medical History:   Diagnosis Date   • Arthritis    • Depression    • Disease of thyroid gland    • Factor V Leiden    • Hypertension    • Infarction of kidney     BLOOD CLOT IN KIDNEY   • Injury of back    • Murmur, heart    • PTSD (post-traumatic stress disorder)         Past Surgical History:   Procedure Laterality Date   • LUMBAR DISCECTOMY Bilateral 2017    Procedure: LUMBAR Micro DISCECTOMY LEFT L4-5 and RIGHT L5-S1;  Surgeon: Favio Castillo MD;  Location: [facility] OR;  Service:    • TONSILLECTOMY     • VASECTOMY       Allergies   Allergen Reactions   • Eggs Or Egg-Derived Products Nausea And Vomiting       Current Outpatient Prescriptions:   •  apixaban (ELIQUIS) 5 MG tablet tablet, Take 1 tablet by mouth 2 (Two) Times a Day., Disp: 60 tablet, Rfl: 2  •  B-D 3CC LUER-SRIDHAR SYR 14SR5-3/2 22G X 1-1/2\" 3 ML misc, USE AS DIRECTED, Disp: , Rfl: 1  •  escitalopram (LEXAPRO) 20 MG tablet, TAKE 1 TABLET BY MOUTH DAILY, Disp: , Rfl: 5  •  HYDROcodone-acetaminophen (NORCO)  MG per tablet, Take 1 tablet by mouth 2 (Two) Times a Day., Disp: 60 tablet, Rfl: 0  •  levothyroxine (SYNTHROID, LEVOTHROID) 50 MCG tablet, TAKE 1 TABLET BY MOUTH DAILY, Disp: , Rfl: 11  •  lisinopril (PRINIVIL,ZESTRIL) 10 MG tablet, TAKE 1 TABLET EVERY DAY, Disp: , Rfl: 5  •  Testosterone Cypionate (DEPOTESTOTERONE CYPIONATE) 200 MG/ML injection, , " Disp: , Rfl:   •  tiZANidine (ZANAFLEX) 4 MG tablet, TAKE 1 TABLET BY MOUTH EVERY 8 HOURS AS NEEDED, Disp: , Rfl: 1  •  topiramate (TOPAMAX) 50 MG tablet, , Disp: , Rfl:     Visit Dx:     ICD-10-CM ICD-9-CM   1. Displacement of lumbar intervertebral disc without myelopathy M51.26 722.10   2. Herniated intervertebral disc of lumbar spine M51.26 722.10   3. Degeneration of lumbosacral intervertebral disc M51.37 722.52   4. Lumbar radiculopathy M54.16 724.4             Patient History       02/22/18 0900          History    Chief Complaint Pain  -NH      Type of Pain Back pain  -NH      Brief Description of Current Complaint Patient has a Hx of chronic LBP that led up to surgery on 12/1/17 for herniated discs. Patient reports that it was a laminectomy. Patient reports continued pain after surgery in which he has pain that goes into his B hip sockets. States at night his legs feel extremely stiff and tight. Pain starts mid afternoon and continues to get worse into the evening, especially when trying to sleep. Patient thinks it may be related to him doing more activity. Patient wants to eventually return to work as .   -NH      Patient/Caregiver Goals Return to work;Relieve pain  -NH      Occupation/sports/leisure activities  (still off work); Carpentry work on the side  -NH      Pain     Pain Location Back  -NH      Pain at Present 3  -NH      Pain at Best 1  -NH      Pain at Worst 9  -NH      What Performance Factors Make the Current Problem(s) WORSE? More activity= increased pain, climbs ladders  -NH        User Key  (r) = Recorded By, (t) = Taken By, (c) = Cosigned By    Initials Name Provider Type    NH Yaz Tucker, PT Physical Therapist                PT Ortho       02/22/18 0900    Subjective Comments    Subjective Comments Patient reports he will be consistent with stretches. He still remembers a few from therapy before.   -NH    Precautions and Contraindications     Precautions/Limitations no known precautions/limitations  -NH    Precautions s/p Laminectomy 12/1/17  -NH    Posture/Observations    Posture/Observations Comments Normal posture, no acute distress. Mild TTP lumbar spine L3-S1. Good recall and demos proper return on log roll for mat table transfers.   -NH    Quarter Clearing    Quarter Clearing Lower Quarter Clearing  -NH    Neural Tension Signs- Lower Quarter Clearing    SLR Bilateral:;Positive   Passive SLR pain in B calves worse on L  -NH    Prone knee flexion Right:;Negative   LBP  -NH    Lumbar ROM Screen- Lower Quarter Clearing    Lumbar Flexion Normal   tightness LB and back of legs  -NH    Lumbar Extension Normal  -NH    Lumbar Lateral Flexion Normal  -NH    MMT (Manual Muscle Testing)    General MMT Assessment lower extremity strength deficits identified  -NH    Left Hip    Hip Flexion Gross Movement (4-/5) good minus  -NH    Hip Extension Gross Movement (4/5) good  -NH    Hip ABduction Gross Movement (4-/5) good minus  -NH    Hip ADduction Gross Movement (4-/5) good minus  -NH    Right Hip    Hip Flexion Gross Movement (4-/5) good minus  -NH    Hip Extension Gross Movement (4/5) good  -NH    Hip ABduction Gross Movement (4-/5) good minus  -NH    Hip ADduction Gross Movement (4-/5) good minus  -NH    Lower Extremity    Lower Ext Manual Muscle Testing left hip strength deficit;right hip strength deficit;left knee strength deficit;right knee strength deficit  -NH    Left Knee    Knee Extension Gross Movement (4+/5) good plus  -NH    Knee Flexion Gross Movement (4+/5) good plus  -NH    Right Knee    Knee Extension Gross Movement (4+/5) good plus  -NH    Knee Flexion Gross Movement (5/5) normal  -NH    Flexibility    Flexibility Tested? Lower Extremity  -NH    Lower Extremity Flexibility    Hamstrings Right:;Mildly limited;Left:;Moderately limited  -NH    Hip Flexors Bilateral:;Mildly limited  -NH    Quadriceps Bilateral:;Mildly limited  -NH    Hip External  Rotators Bilateral:;Moderately limited  -NH      User Key  (r) = Recorded By, (t) = Taken By, (c) = Cosigned By    Initials Name Provider Type    NH Yaz Tucker, PT Physical Therapist                      Therapy Education  Education Details: HEP, body mechanics, sleep positioning, use of ice with TENS unit at home  Given: HEP, Symptoms/condition management, Pain management, Posture/body mechanics  Program: New  How Provided: Verbal, Demonstration, Written  Provided to: Patient  Level of Understanding: Verbalized, Demonstrated           PT OP Goals       02/22/18 1200       PT Short Term Goals    STG Date to Achieve 03/15/18  -NH     STG 1 IND and compliant with HEP  -NH     STG 2 Reduce Modified OSWESTRY to 30% or less  -NH     STG 3 Patient will have 4+/5 B hip flexion strength  -NH     STG 4 Patient will be able to tolerate 45 min treatment session without increased pain.   -NH     STG 5 Patient will have resolution of radicular pain in B hips  -NH     Long Term Goals    LTG Date to Achieve 03/08/18  -NH     LTG 1 Reduce Modified OSWESTRY to 20% or less  -NH     LTG 2 Patient will be able to demo proper lifting from floor to waist 20# without cuing  -NH     LTG 3 Patient will RTW without restriction  -NH     LTG 4 Patient will have 5/5 B hip flexion strength  -NH     LTG 5 Patient will have 5/5 B hip extension strength  -NH     LTG 6 Patient will improve LB flexibility to mildly restricted and equal bilaterally.  -NH     Time Calculation    PT Goal Re-Cert Due Date 02/15/18  -NH       User Key  (r) = Recorded By, (t) = Taken By, (c) = Cosigned By    Initials Name Provider Type    NH Yaz Tucker, PT Physical Therapist                PT Assessment/Plan       02/22/18 1200       PT Assessment    Functional Limitations Limitations in community activities;Limitations in functional capacity and performance;Performance in leisure activities;Performance in work activities;Performance in sport activities  -NH      Impairments Endurance;Impaired flexibility;Pain;Muscle strength;Joint mobility;Poor body mechanics;Posture;Range of motion  -NH     Assessment Comments Patient is a 42 year old male presenting to the clinic with continued LBP with radiculopathy following recent surgery in December 2017. Patient presents with positive neural tension tests L>R and B hip tightness. Patient demonstrates poor to fair core stability. Patient would benefit from skilled physical therapy to address deficits outlined in evaluation in order to return patient to [facility]. Patient has to return to work as a  and needs to be able to perform high demand job without re-injury.   -NH     Please refer to paper survey for additional self-reported information Yes  -NH     Rehab Potential Good  -NH     Patient/caregiver participated in establishment of treatment plan and goals Yes  -NH     Patient would benefit from skilled therapy intervention Yes  -NH     PT Plan    PT Frequency 2x/week  -NH     Predicted Duration of Therapy Intervention (days/wks) 6 weeks  -NH     Planned CPT's? PT EVAL LOW COMPLEXITY: 97785;PT RE-EVAL: 46950;PT THER PROC EA 15 MIN: 88530;PT THER ACT EA 15 MIN: 18167;PT MANUAL THERAPY EA 15 MIN: 70112;PT NEUROMUSC RE-EDUCATION EA 15 MIN: 27799;PT GAIT TRAINING EA 15 MIN: 42359;PT ELECTRICAL STIM UNATTEND: ;PT THER SUPP EA 15 MIN  -NH     Physical Therapy Interventions (Optional Details) balance training;home exercise program;lumbar stabilization;modalities;manual therapy techniques;neuromuscular re-education;patient/family education;postural re-education;stair training;strengthening;stretching  -NH     PT Plan Comments Focus on progression toward RTW activities and core endurance for upright activities. B hip stretching and lumbar mobility exercises.   -NH       User Key  (r) = Recorded By, (t) = Taken By, (c) = Cosigned By    Initials Name Provider Type    NH Yaz Tucker, PT Physical Therapist                   Exercises       02/22/18 0900          Subjective Comments    Subjective Comments Patient reports he will be consistent with stretches. He still remembers a few from therapy before.   -NH      Aquatics    Aquatics performed? No  -NH      Exercise 1    Exercise Name 1 TA isometric education  -NH      Time (Minutes) 1 2 min  -NH      Exercise 2    Exercise Name 2 Pelvic tilts  -NH      Reps 2 10  -NH      Exercise 3    Exercise Name 3 LTR  -NH      Reps 3 10  -NH      Exercise 4    Exercise Name 4 HL modified piriformis stretch  -NH      Sets 4 3  -NH      Time (Seconds) 4 30  -NH      Exercise 5    Exercise Name 5 Supine sciatic nerve glides  -NH      Reps 5 20  -NH      Additional Comments bilateral, towel to hold thigh at 90  -NH      Exercise 6    Exercise Name 6 S/L quad stretch  -NH      Sets 6 1  -NH      Additional Comments Demo for HEP  -NH      Exercise 7    Exercise Name 7 SKTC stretch  -NH      Sets 7 3  -NH      Time (Seconds) 7 30  -NH        User Key  (r) = Recorded By, (t) = Taken By, (c) = Cosigned By    Initials Name Provider Type    NH Yaz Tucker, PT Physical Therapist                        Outcome Measure Options: Modkarthikd Marge  Modified Oswestry  Modified Oswestry Score/Comments: 44%      Time Calculation:   Start Time: 0938  Stop Time: 1017  Time Calculation (min): 39 min  Total Timed Code Minutes- PT: 0 minute(s)     Therapy Charges for Today     Code Description Service Date Service Provider Modifiers Qty    79096697103  PT EVAL LOW COMPLEXITY 3 2/22/2018 Yaz Tucker, PT GP 1          PT G-Codes  Outcome Measure Options: Modifed Owestry         Yaz Tucker, PT  2/22/2018

## 2018-02-27 ENCOUNTER — OFFICE VISIT (OUTPATIENT)
Dept: CARDIOLOGY | Facility: CLINIC | Age: 43
End: 2018-02-27

## 2018-02-27 VITALS
DIASTOLIC BLOOD PRESSURE: 86 MMHG | SYSTOLIC BLOOD PRESSURE: 138 MMHG | BODY MASS INDEX: 39.17 KG/M2 | HEART RATE: 98 BPM | WEIGHT: 305.2 LBS | HEIGHT: 74 IN

## 2018-02-27 DIAGNOSIS — I10 ESSENTIAL HYPERTENSION: ICD-10-CM

## 2018-02-27 DIAGNOSIS — N28.0 RENAL INFARCTION (HCC): ICD-10-CM

## 2018-02-27 DIAGNOSIS — D68.51 FACTOR V LEIDEN (HCC): ICD-10-CM

## 2018-02-27 DIAGNOSIS — G43.909 MIGRAINE WITHOUT STATUS MIGRAINOSUS, NOT INTRACTABLE, UNSPECIFIED MIGRAINE TYPE: ICD-10-CM

## 2018-02-27 DIAGNOSIS — Q21.12 PFO (PATENT FORAMEN OVALE): Primary | ICD-10-CM

## 2018-02-27 PROCEDURE — 99214 OFFICE O/P EST MOD 30 MIN: CPT | Performed by: INTERNAL MEDICINE

## 2018-02-27 PROCEDURE — 93000 ELECTROCARDIOGRAM COMPLETE: CPT | Performed by: INTERNAL MEDICINE

## 2018-02-27 NOTE — ASSESSMENT & PLAN NOTE
Since patient was found to have factor V Leiden deficiency, development of a DVT that subsequently embolized to the arterial system and cause a renal infarction was made possible by PFO.  However, now that a known etiology for thrombus formation has been identified in the factor V Leiden deficiency, treatment will be anticoagulation for life.  No indication for PFO closure at this point, but should the patient ever have another venous thromboembolic event while on anticoagulation, would then consider PFO closure.

## 2018-02-27 NOTE — PROGRESS NOTES
Subjective:     Encounter Date:02/27/2018      Patient ID: Alin Richard is a 42 y.o. male.    Chief Complaint: PFO f/u  History of Present Illness   42-year-old male who presented to the hospital last October with flank pain and was found by CT to have a renal infarct.  Workup for this revealed a patent foramen ovale, and we were initially asked whether closure was indicated.  At that time, my recommendation was to anticoagulate the patient and to follow him clinically.  Since that time, the flank pain for which he initially presented to the hospital has resolved.  He continued having chronic low back pain and underwent successful back surgery with Dr. Castillo on 12/1/17 - doing PT now and still on Norco BID. Has lost 30 lbs since by doing low-carb, high-protein diet.  Hypercoagulable workup has since been completed and the patient was found to have factor V Leiden deficiency.  He returns today to discuss options regarding his PFO and management decisions in light of the above findings.      Since being on anticoagulation, patient has had no other known thrombotic events.  He does have a history of associated migraines, but these have completely resolved since being started on Topamax.    HTN: well controlled on lisinopril     Very rare palpitaitons, last 1-2 seconds     The following portions of the patient's history were reviewed and updated as appropriate: allergies, current medications, past family history, past medical history, past social history, past surgical history and problem list.    Review of Systems   Constitution: Negative for malaise/fatigue.   Cardiovascular: Negative for chest pain, claudication, dyspnea on exertion, leg swelling, near-syncope, orthopnea, palpitations, paroxysmal nocturnal dyspnea and syncope.   Respiratory: Negative for shortness of breath.    Hematologic/Lymphatic: Does not bruise/bleed easily.        Objective:      Vitals:    02/27/18 1426   BP: 138/86   Pulse: 98      Physical Exam   Constitutional: He is oriented to person, place, and time. He appears well-developed and well-nourished.   Neck: No JVD present.   Cardiovascular: Normal rate, regular rhythm, normal heart sounds and intact distal pulses.    No murmur heard.  Pulmonary/Chest: Effort normal and breath sounds normal.   Musculoskeletal: He exhibits no edema.   Neurological: He is alert and oriented to person, place, and time.   Skin: Skin is warm and dry.       Lab Review:         ECG 12 Lead  Date/Time: 2/27/2018 3:22 PM  Performed by: KHUSHBOO KRISHNAMURTHY  Authorized by: KHUSHBOO KRISHNAMURTHY   Rhythm: sinus rhythm  Rate: normal  BPM: 98  QRS axis: normal  Other findings comments: Nonspecific T wave abnormlaity  Clinical impression: abnormal ECG              Assessment/Plan:     Problem List Items Addressed This Visit        Cardiovascular and Mediastinum    Renal infarction    Current Assessment & Plan     Improved/resolved. Due to VTE (likely from Factor V Leiden deficiency) that embolized through PFO; continue eliquis         Hypertension    Current Assessment & Plan     Well-controlled on lisinopril 10 mg daily; continue to follow with Dr. Stallworth.         Relevant Orders    ECG 12 Lead    PFO (patent foramen ovale) - Primary    Current Assessment & Plan     Since patient was found to have factor V Leiden deficiency, development of a DVT that subsequently embolized to the arterial system and cause a renal infarction was made possible by PFO.  However, now that a known etiology for thrombus formation has been identified in the factor V Leiden deficiency, treatment will be anticoagulation for life.  No indication for PFO closure at this point, but should the patient ever have another venous thromboembolic event while on anticoagulation, would then consider PFO closure.         Migraine    Current Assessment & Plan     Symptoms resolved after Topamax; with presence of PFO, should migraines recur despite medical therapy,  could also consider this as an indication for closure.              Hematopoietic and Hemostatic    Factor V Leiden    Current Assessment & Plan     Recommend lifelong anticoagulation;  continue Eliquis 5 mg twice a day             F/u PRN    Talon Reis MD  02/27/2018  5:15 PM

## 2018-02-27 NOTE — ASSESSMENT & PLAN NOTE
Improved/resolved. Due to VTE (likely from Factor V Leiden deficiency) that embolized through PFO; continue eliquis

## 2018-02-27 NOTE — ASSESSMENT & PLAN NOTE
Symptoms resolved after Topamax; with presence of PFO, should migraines recur despite medical therapy, could also consider this as an indication for closure.

## 2018-02-28 ENCOUNTER — HOSPITAL ENCOUNTER (OUTPATIENT)
Dept: PHYSICAL THERAPY | Facility: HOSPITAL | Age: 43
Setting detail: THERAPIES SERIES
Discharge: HOME | End: 2018-02-28

## 2018-02-28 DIAGNOSIS — M51.26 DISPLACEMENT OF LUMBAR INTERVERTEBRAL DISC WITHOUT MYELOPATHY: Primary | ICD-10-CM

## 2018-02-28 PROCEDURE — 97110 THERAPEUTIC EXERCISES: CPT

## 2018-02-28 PROCEDURE — G0283 ELEC STIM OTHER THAN WOUND: HCPCS

## 2018-03-02 ENCOUNTER — HOSPITAL ENCOUNTER (OUTPATIENT)
Dept: PHYSICAL THERAPY | Facility: HOSPITAL | Age: 43
Setting detail: THERAPIES SERIES
Discharge: HOME | End: 2018-03-02

## 2018-03-02 DIAGNOSIS — M51.26 DISPLACEMENT OF LUMBAR INTERVERTEBRAL DISC WITHOUT MYELOPATHY: Primary | ICD-10-CM

## 2018-03-02 DIAGNOSIS — M51.26 HERNIATED INTERVERTEBRAL DISC OF LUMBAR SPINE: ICD-10-CM

## 2018-03-02 DIAGNOSIS — M54.16 LUMBAR RADICULOPATHY: ICD-10-CM

## 2018-03-02 DIAGNOSIS — M51.37 DEGENERATION OF LUMBOSACRAL INTERVERTEBRAL DISC: ICD-10-CM

## 2018-03-02 PROCEDURE — 97110 THERAPEUTIC EXERCISES: CPT | Performed by: PHYSICAL THERAPIST

## 2018-03-02 PROCEDURE — G0283 ELEC STIM OTHER THAN WOUND: HCPCS | Performed by: PHYSICAL THERAPIST

## 2018-03-02 NOTE — THERAPY TREATMENT NOTE
"    Outpatient Physical Therapy Ortho Treatment Note  [facility]     Patient Name: Alin Richard  : 1975  MRN: [id number]  Today's Date: 3/2/2018      Visit Date: 2018          Subjective Improvement: \"getting better\"       Attendance: 3/3   Approved: 8 visits           MD follow up: ALICIA                          date: 3/15/18        Visit Dx:    ICD-10-CM ICD-9-CM   1. Displacement of lumbar intervertebral disc without myelopathy M51.26 722.10   2. Herniated intervertebral disc of lumbar spine M51.26 722.10   3. Degeneration of lumbosacral intervertebral disc M51.37 722.52   4. Lumbar radiculopathy M54.16 724.4       Patient Active Problem List   Diagnosis   • Displacement of lumbar intervertebral disc without myelopathy   • BMI 38.0-38.9,adult   • Non-smoker   • Renal infarction   • Hypertension   • Herniated intervertebral disc of lumbar spine   • Disease of thyroid gland   • PFO (patent foramen ovale)   • Degeneration of lumbar or lumbosacral intervertebral disc   • Lumbar radiculopathy   • Factor V Leiden   • Migraine        Past Medical History:   Diagnosis Date   • Arthritis    • Depression    • Disease of thyroid gland    • Factor V Leiden    • Hypertension    • Infarction of kidney     BLOOD CLOT IN KIDNEY   • Injury of back    • Murmur, heart    • PTSD (post-traumatic stress disorder)         Past Surgical History:   Procedure Laterality Date   • BACK SURGERY     • LUMBAR DISCECTOMY Bilateral 2017    Procedure: LUMBAR Micro DISCECTOMY LEFT L4-5 and RIGHT L5-S1;  Surgeon: Favio Castillo MD;  Location: [facility];  Service:    • TONSILLECTOMY     • VASECTOMY               PT Ortho       18 1600    Subjective Comments    Subjective Comments Pt reports that he is not having any pain today  -MB    Precautions and Contraindications    Precautions/Limitations no known precautions/limitations  -MB    Precautions s/p Laminectomy 17  -MB    Subjective Pain    Able to " rate subjective pain? yes  -MB    Pre-Treatment Pain Level 0  -MB    Post-Treatment Pain Level 0  -MB      User Key  (r) = Recorded By, (t) = Taken By, (c) = Cosigned By    Initials Name Provider Type    TAMMY Velez, PTA Physical Therapy Assistant                            PT Assessment/Plan       03/02/18 0800       PT Assessment    Functional Limitations Limitations in community activities;Limitations in functional capacity and performance;Performance in leisure activities;Performance in work activities;Performance in sport activities  -KG     Impairments Endurance;Impaired flexibility;Pain;Muscle strength;Joint mobility;Poor body mechanics;Posture;Range of motion  -KG     Assessment Comments Pt progressing well PT. More challenged with core activation on physioball but does very well overall with min to no cuing required for PN alignment. Pt reports continued compliance with HEP.  -KG     Rehab Potential Good  -KG     Patient/caregiver participated in establishment of treatment plan and goals Yes  -KG     Patient would benefit from skilled therapy intervention Yes  -KG     PT Plan    PT Frequency 2x/week  -KG     Predicted Duration of Therapy Intervention (days/wks) 6 weeks  -KG     PT Plan Comments Continue physiball core activities; pball TA rows/lat pulls.  -KG       User Key  (r) = Recorded By, (t) = Taken By, (c) = Cosigned By    Initials Name Provider Type    DIO Linton PT Physical Therapist                Modalities       03/02/18 0800          Moist Heat    MH Applied Yes  -KG      Location Lumbar  -KG      Rx Minutes 15 mins  -KG      MH S/P Rx Yes  -KG      ELECTRICAL STIMULATION    Attended/Unattended Unattended  -KG      Stimulation Type IFC  -KG      Location/Electrode Placement/Other Lumbar  -KG      Rx Minutes 15 mins  -KG        User Key  (r) = Recorded By, (t) = Taken By, (c) = Cosigned By    Initials Name Provider Type    DIO Linton PT Physical Therapist               "  Exercises       03/02/18 0800          Subjective Comments    Subjective Comments Pt states his a little sore today and his L hamstring feels tight. Otherwise, no new complaints or concerns. States he's already noticing a difference since starting PT.  -KG      Subjective Pain    Able to rate subjective pain? yes  -KG      Pre-Treatment Pain Level 0  -KG      Post-Treatment Pain Level 0  -KG      Aquatics    Aquatics performed? No  -KG      Exercise 1    Exercise Name 1 PRO II ROM/End  -KG      Time (Minutes) 1 7  -KG      Additional Comments 4.0  -KG      Exercise 2    Exercise Name 2 [place] S  -KG      Reps 2 3  -KG      Time (Seconds) 2 30  -KG      Exercise 3    Exercise Name 3 Supine Piriformis S  -KG      Reps 3 3  -KG      Time (Seconds) 3 30  -KG      Exercise 4    Exercise Name 4 SKTC S  -KG      Reps 4 3  -KG      Time (Seconds) 4 30  -KG      Exercise 5    Exercise Name 5 LTR   -KG      Sets 5 1  -KG      Reps 5 10  -KG      Time (Seconds) 5 5\" hold  -KG      Exercise 6    Exercise Name 6 Physioball Marching + TA  -KG      Sets 6 2  -KG      Reps 6 10  -KG      Exercise 7    Exercise Name 7 Physioball LAQ + TA  -KG      Sets 7 2  -KG      Reps 7 10  -KG      Exercise 8    Exercise Name 8 Physioball woodchops + TA  -KG      Sets 8 2  -KG      Reps 8 10  -KG      Additional Comments Bilateral; 2.2# MB  -KG      Exercise 9    Exercise Name 9 Physiball overhead raises + TA  -KG      Sets 9 2  -KG      Reps 9 10  -KG      Additional Comments 2.2# MB  -KG        User Key  (r) = Recorded By, (t) = Taken By, (c) = Cosigned By    Initials Name Provider Type    KG Qian Linton, PT Physical Therapist                               PT OP Goals       03/02/18 0800       PT Short Term Goals    STG Date to Achieve 03/15/18  -KG     STG 1 IND and compliant with HEP  -KG     STG 1 Progress Progressing  -KG     STG 2 Reduce Modified OSWESTRY to 30% or less  -KG     STG 2 Progress Progressing  -KG     STG 3 " Patient will have 4+/5 B hip flexion strength  -KG     STG 3 Progress Progressing  -KG     STG 4 Patient will be able to tolerate 45 min treatment session without increased pain.   -KG     STG 4 Progress Progressing  -KG     STG 5 Patient will have resolution of radicular pain in B hips  -KG     STG 5 Progress Progressing  -KG     Long Term Goals    LTG Date to Achieve 03/08/18  -KG     LTG 1 Reduce Modified OSWESTRY to 20% or less  -KG     LTG 1 Progress Progressing  -KG     LTG 2 Patient will be able to demo proper lifting from floor to waist 20# without cuing  -KG     LTG 2 Progress Progressing  -KG     LTG 3 Patient will RTW without restriction  -KG     LTG 3 Progress Progressing  -KG     LTG 4 Patient will have 5/5 B hip flexion strength  -KG     LTG 4 Progress Progressing  -KG     LTG 5 Patient will have 5/5 B hip extension strength  -KG     LTG 5 Progress Progressing  -KG     LTG 6 Patient will improve LB flexibility to mildly restricted and equal bilaterally.  -KG     LTG 6 Progress Progressing  -KG     Time Calculation    PT Goal Re-Cert Due Date 03/15/18  -KG       User Key  (r) = Recorded By, (t) = Taken By, (c) = Cosigned By    Initials Name Provider Type    KG Qian Linton, PT Physical Therapist          Therapy Education  Given: HEP, Symptoms/condition management, Pain management  Program: Reinforced  How Provided: Verbal, Demonstration  Provided to: Patient  Level of Understanding: Demonstrated, Verbalized              Time Calculation:   Start Time: 0810  Stop Time: 0907  Time Calculation (min): 57 min  Total Timed Code Minutes- PT: 42 minute(s)    Therapy Charges for Today     Code Description Service Date Service Provider Modifiers Qty    27917727580 HC PT THER PROC EA 15 MIN 3/2/2018 Qian Linton, PT GP 3    12315541040 HC PT ELECTRICAL STIM UNATTENDED 3/2/2018 Qian Linton, PT  1    36007347779 HC PT THER SUPP EA 15 MIN 3/2/2018 Qian Linton, PT GP 1                    Qian WOLF  Royer, PT  3/2/2018

## 2018-03-07 ENCOUNTER — HOSPITAL ENCOUNTER (OUTPATIENT)
Dept: PHYSICAL THERAPY | Facility: HOSPITAL | Age: 43
Setting detail: THERAPIES SERIES
Discharge: HOME | End: 2018-03-07

## 2018-03-07 DIAGNOSIS — M51.37 DEGENERATION OF LUMBOSACRAL INTERVERTEBRAL DISC: ICD-10-CM

## 2018-03-07 DIAGNOSIS — M51.26 HERNIATED INTERVERTEBRAL DISC OF LUMBAR SPINE: ICD-10-CM

## 2018-03-07 DIAGNOSIS — M54.16 LUMBAR RADICULOPATHY: ICD-10-CM

## 2018-03-07 DIAGNOSIS — M51.37 DEGENERATION OF LUMBAR OR LUMBOSACRAL INTERVERTEBRAL DISC: ICD-10-CM

## 2018-03-07 DIAGNOSIS — M51.26 DISPLACEMENT OF LUMBAR INTERVERTEBRAL DISC WITHOUT MYELOPATHY: Primary | ICD-10-CM

## 2018-03-07 PROCEDURE — 97110 THERAPEUTIC EXERCISES: CPT

## 2018-03-07 PROCEDURE — G0283 ELEC STIM OTHER THAN WOUND: HCPCS

## 2018-03-07 NOTE — THERAPY TREATMENT NOTE
"    Outpatient Physical Therapy Ortho Treatment Note  [facility]  Johanny Aponte PTA       Patient Name: Alin Richard  : 1975  MRN: [id number]  Today's Date: 3/7/2018      Visit Date: 2018     Visits: 4/4  Insurance Visits Approved: 8 visits  Recert Due: 03/15/2018  MD Appt: TBD with surgeon  Pain: pretreatment \"sore\"/10; post treatment 0/10  Improvement: pt is subjectively reporting reports has some improvement% improvement since initial evaluation    Visit Dx:    ICD-10-CM ICD-9-CM   1. Displacement of lumbar intervertebral disc without myelopathy M51.26 722.10   2. Herniated intervertebral disc of lumbar spine M51.26 722.10   3. Degeneration of lumbosacral intervertebral disc M51.37 722.52   4. Lumbar radiculopathy M54.16 724.4   5. Degeneration of lumbar or lumbosacral intervertebral disc M51.37 722.52       Patient Active Problem List   Diagnosis   • Displacement of lumbar intervertebral disc without myelopathy   • BMI 38.0-38.9,adult   • Non-smoker   • Renal infarction   • Hypertension   • Herniated intervertebral disc of lumbar spine   • Disease of thyroid gland   • PFO (patent foramen ovale)   • Degeneration of lumbar or lumbosacral intervertebral disc   • Lumbar radiculopathy   • Factor V Leiden   • Migraine        Past Medical History:   Diagnosis Date   • Arthritis    • Depression    • Disease of thyroid gland    • Factor V Leiden    • Hypertension    • Infarction of kidney     BLOOD CLOT IN KIDNEY   • Injury of back    • Murmur, heart    • PTSD (post-traumatic stress disorder)         Past Surgical History:   Procedure Laterality Date   • BACK SURGERY     • LUMBAR DISCECTOMY Bilateral 2017    Procedure: LUMBAR Micro DISCECTOMY LEFT L4-5 and RIGHT L5-S1;  Surgeon: Favio Castillo MD;  Location: [facility] OR;  Service:    • TONSILLECTOMY     • VASECTOMY               PT Ortho       18 0800    Subjective Comments    Subjective Comments states that he is just sore " today, reports that he did a lot yesterday. denies pain and states that its only soreness. states that the doctor has referred him to orthopedic institute with Dr. Guaman in [place]. will have injections on March 22, 2018.   -    Precautions and Contraindications    Precautions/Limitations no known precautions/limitations  -    Precautions s/p Laminectomy 12/1/17  -    Subjective Pain    Able to rate subjective pain? yes  -    Pre-Treatment Pain Level --   sore  -    Post-Treatment Pain Level 0  -      User Key  (r) = Recorded By, (t) = Taken By, (c) = Cosigned By    Initials Name Provider Type     Johanny Aponte PTA Physical Therapy Assistant                            PT Assessment/Plan       03/07/18 0800       PT Assessment    Assessment Comments demonstrates good knowledge of current HEP and is able to demonstrate and verbalize with no cues. good effort throughout. demonstrates decent transab contraction with physioball exercises, better when only UE involved in exercise  -     PT Plan    PT Frequency 2x/week  -     PT Plan Comments continue to progress physioball activity. initiate step ups fwd with opp hip ext  -       User Key  (r) = Recorded By, (t) = Taken By, (c) = Cosigned By    Initials Name Provider Type     Johanny Aponte PTA Physical Therapy Assistant                Modalities       03/07/18 0800          Moist Heat     Applied Yes  -      Location Lumbar  -      Rx Minutes --   20 mins  -[facility] S/P Rx Yes  -      ELECTRICAL STIMULATION    Attended/Unattended Unattended  -      Stimulation Type IFC  -      Location/Electrode Placement/Other Lumbar  -      Rx Minutes 20 mins  -        User Key  (r) = Recorded By, (t) = Taken By, (c) = Cosigned By    Initials Name Provider Type     Johanny Aponte PTA Physical Therapy Assistant                Exercises       03/07/18 0800          Subjective Comments    Subjective Comments states that he is just sore  today, reports that he did a lot yesterday. denies pain and states that its only soreness. states that the doctor has referred him to orthopedic institute with Dr. Guaman in [place]. will have injections on March 22, 2018.   -      Subjective Pain    Able to rate subjective pain? yes  -      Pre-Treatment Pain Level --   sore  -MH      Post-Treatment Pain Level 0  -MH      Exercise 1    Exercise Name 1 Pro II LE's  -MH      Time (Minutes) 1 10 minutes  -      Additional Comments 5.5  -MH      Exercise 2    Exercise Name 2 Incline Calf S  -MH      Reps 2 2  -MH      Time (Seconds) 2 30 sec hold  -MH      Exercise 3    Exercise Name 3 B St. HS S  -MH      Reps 3 2  -MH      Time (Seconds) 3 30 sec hold  -MH      Exercise 4    Exercise Name 4 B Seated Piriformis S  -MH      Reps 4 2  -MH      Time (Seconds) 4 30 sec hold  -MH      Exercise 5    Exercise Name 5 Sit to/from Stand with Lx Ext  -      Reps 5 10  -MH      Time (Seconds) 5 5 sec hold  -MH      Exercise 6    Exercise Name 6 LTR   -      Reps 6 10  -MH      Time (Seconds) 6 10 sec hold  -MH      Exercise 7    Exercise Name 7 Bridges with ADD squeeze  -      Reps 7 20  -MH      Time (Seconds) 7 5 sec hold  -MH      Exercise 8    Exercise Name 8 Physioball Marching  -      Exercise 9    Exercise Name 9 Physioball LAQ  -      Exercise 10    Exercise Name 10 Physioball Woodchop B  -MH      Exercise 11    Exercise Name 11 Physioball Chest press out and Up  -        User Key  (r) = Recorded By, (t) = Taken By, (c) = Cosigned By    Initials Name Provider Type     Johanny Aponte, NAVIN Physical Therapy Assistant                               PT OP Goals       03/07/18 0800       PT Short Term Goals    STG Date to Achieve 03/15/18  -     STG 1 IND and compliant with HEP  -     STG 1 Progress Met  -     STG 2 Reduce Modified OSWESTRY to 30% or less  -     STG 2 Progress Progressing  -     STG 3 Patient will have 4+/5 B hip flexion  strength  -     STG 3 Progress Progressing  -     STG 4 Patient will be able to tolerate 45 min treatment session without increased pain.   -     STG 4 Progress Met  -     STG 5 Patient will have resolution of radicular pain in B hips  -     STG 5 Progress Progressing  -     Long Term Goals    LTG Date to Achieve 03/08/18  -     LTG 1 Reduce Modified OSWESTRY to 20% or less  -     LTG 1 Progress Progressing  -     LTG 2 Patient will be able to demo proper lifting from floor to waist 20# without cuing  -     LTG 2 Progress Progressing  -     LTG 3 Patient will RTW without restriction  -     LTG 3 Progress Progressing  -     LTG 4 Patient will have 5/5 B hip flexion strength  -     LTG 4 Progress Progressing  -     LTG 5 Patient will have 5/5 B hip extension strength  -     LTG 5 Progress Progressing  -     LTG 6 Patient will improve LB flexibility to mildly restricted and equal bilaterally.  -     LTG 6 Progress Progressing  -     Time Calculation    PT Goal Re-Cert Due Date 03/15/18  -       User Key  (r) = Recorded By, (t) = Taken By, (c) = Cosigned By    Initials Name Provider Type     Johanny Aponte PTA Physical Therapy Assistant          Therapy Education  Given: HEP, Symptoms/condition management, Pain management  Program: Reinforced  How Provided: Verbal, Demonstration  Provided to: Patient  Level of Understanding: Demonstrated, Verbalized              Time Calculation:   Start Time: 0845  Stop Time: 0950  Time Calculation (min): 65 min  Total Timed Code Minutes- PT: 45 minute(s)    Therapy Charges for Today     Code Description Service Date Service Provider Modifiers Qty    97452850291 HC PT THER PROC EA 15 MIN 3/7/2018 Johanny Aponte, PTA GP 3    58777369888 HC PT THER SUPP EA 15 MIN 3/7/2018 Johanny Aponte, NAVIN GP 1    09888557329 HC PT ELECTRICAL STIM UNATTENDED 3/7/2018 Johanny Aponte PTA  1                    Johanny Aponte PTA  3/7/2018

## 2018-03-09 ENCOUNTER — HOSPITAL ENCOUNTER (OUTPATIENT)
Dept: PHYSICAL THERAPY | Facility: HOSPITAL | Age: 43
Setting detail: THERAPIES SERIES
Discharge: HOME | End: 2018-03-09

## 2018-03-09 DIAGNOSIS — M51.26 HERNIATED INTERVERTEBRAL DISC OF LUMBAR SPINE: ICD-10-CM

## 2018-03-09 DIAGNOSIS — M51.37 DEGENERATION OF LUMBAR OR LUMBOSACRAL INTERVERTEBRAL DISC: ICD-10-CM

## 2018-03-09 DIAGNOSIS — M51.26 DISPLACEMENT OF LUMBAR INTERVERTEBRAL DISC WITHOUT MYELOPATHY: Primary | ICD-10-CM

## 2018-03-09 DIAGNOSIS — M54.16 LUMBAR RADICULOPATHY: ICD-10-CM

## 2018-03-09 DIAGNOSIS — M51.37 DEGENERATION OF LUMBOSACRAL INTERVERTEBRAL DISC: ICD-10-CM

## 2018-03-09 PROCEDURE — G0283 ELEC STIM OTHER THAN WOUND: HCPCS | Performed by: SPECIALIST/TECHNOLOGIST

## 2018-03-09 PROCEDURE — 97110 THERAPEUTIC EXERCISES: CPT | Performed by: SPECIALIST/TECHNOLOGIST

## 2018-03-09 NOTE — THERAPY TREATMENT NOTE
"    Outpatient Physical Therapy Ortho Treatment Note  [facility]     Patient Name: Alin Richard  : 1975  MRN: [id number]  Today's Date: 3/9/2018      Visit Date: 2018   Patients reports pain as:  0/10   Patient reports overall % improvement as:  \"yes\"   Patients attendance has been:     Insurance visits available   8 visits   Recert Date is:  3-15-18   Next MD visit is:  TBDW/ surgeon         Visit Dx:    ICD-10-CM ICD-9-CM   1. Displacement of lumbar intervertebral disc without myelopathy M51.26 722.10   2. Herniated intervertebral disc of lumbar spine M51.26 722.10   3. Degeneration of lumbosacral intervertebral disc M51.37 722.52   4. Lumbar radiculopathy M54.16 724.4   5. Degeneration of lumbar or lumbosacral intervertebral disc M51.37 722.52       Patient Active Problem List   Diagnosis   • Displacement of lumbar intervertebral disc without myelopathy   • BMI 38.0-38.9,adult   • Non-smoker   • Renal infarction   • Hypertension   • Herniated intervertebral disc of lumbar spine   • Disease of thyroid gland   • PFO (patent foramen ovale)   • Degeneration of lumbar or lumbosacral intervertebral disc   • Lumbar radiculopathy   • Factor V Leiden   • Migraine        Past Medical History:   Diagnosis Date   • Arthritis    • Depression    • Disease of thyroid gland    • Factor V Leiden    • Hypertension    • Infarction of kidney     BLOOD CLOT IN KIDNEY   • Injury of back    • Murmur, heart    • PTSD (post-traumatic stress disorder)         Past Surgical History:   Procedure Laterality Date   • BACK SURGERY     • LUMBAR DISCECTOMY Bilateral 2017    Procedure: LUMBAR Micro DISCECTOMY LEFT L4-5 and RIGHT L5-S1;  Surgeon: Favio Castillo MD;  Location: [facility];  Service:    • TONSILLECTOMY     • VASECTOMY               PT Ortho       18 0800    Posture/Observations    Posture/Observations Comments (P)  fwb.  non-antalgic.  no distress or guarding.    -ML      18 0800    " Subjective Comments    Subjective Comments states that he is just sore today, reports that he did a lot yesterday. denies pain and states that its only soreness. states that the doctor has referred him to orthopedic institute with Dr. Guaman in [place]. will have injections on March 22, 2018.   -    Precautions and Contraindications    Precautions/Limitations no known precautions/limitations  -    Precautions s/p Laminectomy 12/1/17  -    Subjective Pain    Able to rate subjective pain? yes  -    Pre-Treatment Pain Level --   sore  -    Post-Treatment Pain Level 0  -      User Key  (r) = Recorded By, (t) = Taken By, (c) = Cosigned By    Initials Name Provider Type     Johanny Aponte, PTA Physical Therapy Assistant    YARELY Campbell [facility]                             PT Assessment/Plan       03/09/18 1000       PT Assessment    Assessment Comments (P)  nakul rx.  hamstrings loosened w/ rx.  good nakul w/ > volume and new ther-exer.  tmill helpful.  -ML     PT Plan    PT Frequency (P)  2x/week  -ML     PT Plan Comments (P)  multifidus fwd/lat step ups  -ML       User Key  (r) = Recorded By, (t) = Taken By, (c) = Cosigned By    Initials Name Provider Type     Howie Campbell ATC                 Modalities       03/09/18 0900          Moist Heat    MH Applied (P)  Yes  -ML      Location (P)  lumbar- Prone  -ML      MH S/P Rx (P)  Yes  -ML      Ice    Ice Applied (P)  --  -ML      Location (P)     -ML      Rx Minutes (P)  --  -ML      Ice S/P Rx (P)  --  -ML      ELECTRICAL STIMULATION    Attended/Unattended (P)  Unattended  -ML      Stimulation Type (P)  IFC  -ML      Location/Electrode Placement/Other (P)  Lumbar  -ML      Rx Minutes (P)  --   20 min  -ML        User Key  (r) = Recorded By, (t) = Taken By, (c) = Cosigned By    Initials Name Provider Type     Howie Campbell ATC                 Exercises       03/09/18 0900 03/09/18 0800        Subjective Comments    Subjective Comments  (P)  sore primary for activites the other day. it just seems to stay in low back area.  awaiting pain mgmt injections.    -ML     Subjective Pain    Able to rate subjective pain?  (P)  yes  -ML     Pre-Treatment Pain Level  (P)  0  -ML     Post-Treatment Pain Level  (P)  0  -ML     Exercise 1    Exercise Name 1  (P)  pro2   -ML     Time (Minutes) 1  (P)  10 min.   -ML     Additional Comments  (P)  L5.0  -ML     Exercise 2    Exercise Name 2  (P)  Incline Calf S  -ML     Reps 2  (P)  2  -ML     Time (Seconds) 2  (P)  30 sec hold  -ML     Exercise 3    Exercise Name 3  (P)  B St. HS S  -ML     Reps 3  (P)  2  -ML     Time (Seconds) 3  (P)  30 sec hold  -ML     Exercise 4    Exercise Name 4  (P)  B Seated Piriformis S  -ML     Reps 4  (P)  2  -ML     Time (Seconds) 4  (P)  30 sec hold  -ML     Exercise 5    Exercise Name 5 (P)     -ML (P)  Sit to Stand w/ Lx ext  -ML     Sets 5  (P)  2  -ML     Reps 5  (P)  10  -ML     Time (Seconds) 5 (P)     -ML (P)  5 sec hold  -ML     Exercise 6    Exercise Name 6  (P)  Belizean:  Alt LAQ  -ML     Sets 6  (P)  2  -ML     Reps 6  (P)  20  -ML     Exercise 7    Exercise Name 7  (P)  Belizean: Alt March  -ML     Sets 7  (P)  2  -ML     Reps 7  (P)  20  -ML     Exercise 8    Exercise Name 8  (P)  Belizean: kettle up & outs  -ML     Sets 8  (P)  2  -ML     Reps 8  (P)  10  -ML     Additional Comments  (P)  green kettle  -ML     Exercise 9    Exercise Name 9  (P)  Swiss: kettle diag chops  -ML     Sets 9  (P)  2  -ML     Reps 9  (P)  10  -ML     Additional Comments  (P)  green kettle  -ML     Exercise 10    Exercise Name 10  (P)  Belizean: DkC rolls  -ML     Sets 10  (P)  2  -ML     Reps 10  (P)  10  -ML     Exercise 11    Exercise Name 11  (P)  Swiss: feet on ball: alt hip flex w/ TA  -ML     Reps 11  (P)  20  -ML     Exercise 12    Exercise Name 12  (P)  T-mill w/ TA  -ML     Time (Minutes) 12  (P)  10 min  -ML     Additional Comments  (P)  2.5 mph  -ML      Exercise 13    Exercise Name 13  (P)  estim (see modalties)  -ML       User Key  (r) = Recorded By, (t) = Taken By, (c) = Cosigned By    Initials Name Provider Type    YARELY Campbell, [facility]                                PT OP Goals       03/09/18 1000 03/09/18 0800    PT Short Term Goals    STG Date to Achieve (P)  03/15/18  -ML     STG 1 (P)  IND and compliant with HEP  -ML     STG 1 Progress (P)  Met  -ML     STG 2 (P)  Reduce Modified OSWESTRY to 30% or less  -ML     STG 2 Progress (P)  Progressing  -ML     STG 3 (P)  Patient will have 4+/5 B hip flexion strength  -ML     STG 3 Progress (P)  Progressing  -ML     STG 4 (P)  Patient will be able to tolerate 45 min treatment session without increased pain.   -ML     STG 4 Progress (P)  Met  -ML     STG 5 (P)  Patient will have resolution of radicular pain in B hips  -ML     STG 5 Progress (P)  Progressing  -ML     Long Term Goals    LTG Date to Achieve (P)  03/08/18  -ML     LTG 1 (P)  Reduce Modified OSWESTRY to 20% or less  -ML     LTG 1 Progress (P)  Progressing  -ML     LTG 2 (P)  Patient will be able to demo proper lifting from floor to waist 20# without cuing  -ML     LTG 2 Progress (P)  Progressing  -ML     LTG 3 (P)  Patient will RTW without restriction  -ML     LTG 3 Progress (P)  Progressing  -ML     LTG 4 (P)  Patient will have 5/5 B hip flexion strength  -ML     LTG 4 Progress (P)  Progressing  -ML     LTG 5 (P)  Patient will have 5/5 B hip extension strength  -ML     LTG 5 Progress (P)  Progressing  -ML     LTG 6 (P)  Patient will improve LB flexibility to mildly restricted and equal bilaterally.  -ML     LTG 6 Progress (P)  Progressing  -ML     Time Calculation    PT Goal Re-Cert Due Date  (P)  03/15/18  -ML      User Key  (r) = Recorded By, (t) = Taken By, (c) = Cosigned By    Initials Name Provider Type    YARELY Denise Rc Campbell, ATC                          Time Calculation:   Start Time: (P) 0840  Stop Time: (P)  1012  Time Calculation (min): (P) 92 min  Total Timed Code Minutes- PT: (P) 72 minute(s)    Therapy Charges for Today     Code Description Service Date Service Provider Modifiers Qty    27622776105 HC PT THER SUPP EA 15 MIN 3/9/2018 Howie Campbell, ATC  1    66363632253 HC PT ELECTRICAL STIM UNATTENDED 3/9/2018 Howie Campbell, ATC  1    34113041177 HC PT THER PROC EA 15 MIN 3/9/2018 Howie Campbell, ATC  5                    Howie Campbell, ATC  3/9/2018

## 2018-03-14 ENCOUNTER — HOSPITAL ENCOUNTER (OUTPATIENT)
Dept: PHYSICAL THERAPY | Facility: HOSPITAL | Age: 43
Setting detail: THERAPIES SERIES
End: 2018-03-14

## 2018-03-16 ENCOUNTER — HOSPITAL ENCOUNTER (OUTPATIENT)
Dept: PHYSICAL THERAPY | Facility: HOSPITAL | Age: 43
Setting detail: THERAPIES SERIES
Discharge: HOME | End: 2018-03-16

## 2018-03-16 DIAGNOSIS — M51.37 DEGENERATION OF LUMBAR OR LUMBOSACRAL INTERVERTEBRAL DISC: ICD-10-CM

## 2018-03-16 DIAGNOSIS — M51.37 DEGENERATION OF LUMBOSACRAL INTERVERTEBRAL DISC: ICD-10-CM

## 2018-03-16 DIAGNOSIS — M54.16 LUMBAR RADICULOPATHY: ICD-10-CM

## 2018-03-16 DIAGNOSIS — M51.26 DISPLACEMENT OF LUMBAR INTERVERTEBRAL DISC WITHOUT MYELOPATHY: Primary | ICD-10-CM

## 2018-03-16 DIAGNOSIS — M51.26 HERNIATED INTERVERTEBRAL DISC OF LUMBAR SPINE: ICD-10-CM

## 2018-03-16 PROCEDURE — 97110 THERAPEUTIC EXERCISES: CPT

## 2018-03-16 PROCEDURE — G0283 ELEC STIM OTHER THAN WOUND: HCPCS

## 2018-03-16 NOTE — THERAPY PROGRESS REPORT/RE-CERT
Outpatient Physical Therapy Ortho Progress Note  [facility]     Patient Name: Alin Richard  : 1975  MRN: [id number]  Today's Date: 3/16/2018      Visit Date: 2018    Patient Active Problem List   Diagnosis   • Displacement of lumbar intervertebral disc without myelopathy   • BMI 38.0-38.9,adult   • Non-smoker   • Renal infarction   • Hypertension   • Herniated intervertebral disc of lumbar spine   • Disease of thyroid gland   • PFO (patent foramen ovale)   • Degeneration of lumbar or lumbosacral intervertebral disc   • Lumbar radiculopathy   • Factor V Leiden   • Migraine        Past Medical History:   Diagnosis Date   • Arthritis    • Depression    • Disease of thyroid gland    • Factor V Leiden    • Hypertension    • Infarction of kidney     BLOOD CLOT IN KIDNEY   • Injury of back    • Murmur, heart    • PTSD (post-traumatic stress disorder)         Past Surgical History:   Procedure Laterality Date   • BACK SURGERY     • LUMBAR DISCECTOMY Bilateral 2017    Procedure: LUMBAR Micro DISCECTOMY LEFT L4-5 and RIGHT L5-S1;  Surgeon: Favio Castillo MD;  Location: [facility] OR;  Service:    • TONSILLECTOMY     • VASECTOMY         Visit Dx:     ICD-10-CM ICD-9-CM   1. Displacement of lumbar intervertebral disc without myelopathy M51.26 722.10   2. Herniated intervertebral disc of lumbar spine M51.26 722.10   3. Degeneration of lumbosacral intervertebral disc M51.37 722.52   4. Lumbar radiculopathy M54.16 724.4   5. Degeneration of lumbar or lumbosacral intervertebral disc M51.37 722.52                 PT Ortho     Row Name 18 1000       Subjective Comments    Subjective Comments Patient reports he hit his head the other day and fell and has been having pain in the upper back today vs. the normal spot in low back.   -NH       Precautions and Contraindications    Precautions/Limitations no known precautions/limitations  -NH    Precautions s/p Laminectomy 17  -NH        Subjective Pain    Able to rate subjective pain? yes  -NH    Pre-Treatment Pain Level 0  -NH    Post-Treatment Pain Level 0  -NH       Posture/Observations    Posture/Observations Comments fwb.  non-antalgic.  no distress or guarding.  TTP over lower thoracic paraspinals on L. TTP over R SI and QL.  -NH       Neural Tension Signs- Lower Quarter Clearing    SLR Bilateral:;Positive  -NH    Prone knee flexion Bilateral:;Negative  -NH       Lumbar ROM Screen- Lower Quarter Clearing    Lumbar Flexion Normal   fingertip to floor  -NH    Lumbar Extension Normal  -NH    Lumbar Lateral Flexion Normal  -NH       MMT (Manual Muscle Testing)    Additional Documentation General Assessment (Manual Muscle Testing) (Group)  -NH       General Assessment (Manual Muscle Testing)    General Manual Muscle Testing (MMT) Assessment lower extremity strength deficits identified  -NH       Lower Extremity (Manual Muscle Testing)    Lower Extremity: Manual Muscle Testing (MMT) left hip strength deficit;right hip strength deficit;left knee strength deficit;right knee strength deficit  -NH       Left Hip (Manual Muscle Testing)    MMT, Left Hip: Manual Muscle Testing (MMT) 4+/5 gross  -NH       Right Hip (Manual Muscle Testing)    MMT, Right Hip: Manual Muscle Testing (MMT) 4+/5 gross  -NH       Left Knee (Manual Muscle Testing)    Comment, Left Knee: Manual Muscle Testing (MMT) 5/5 gross  -NH       Right Knee (Manual Muscle Testing)    Comment, Right Knee: Manual Muscle Testing (MMT) 5/5 gross  -NH       Lower Extremity Flexibility    Hamstrings Left:;Mildly limited  -NH    Hip Flexors Bilateral:;Mildly limited  -NH    Quadriceps Bilateral:;Mildly limited  -NH    Hip External Rotators Bilateral:;Mildly limited  -NH      User Key  (r) = Recorded By, (t) = Taken By, (c) = Cosigned By    Initials Name Provider Type    NH Yaz Tucker, PT Physical Therapist                                  PT OP Goals     Row Name 03/16/18 1100          PT  Short Term Goals    STG Date to Achieve 03/15/18  -NH     STG 1 IND and compliant with HEP  -NH     STG 1 Progress Met  -NH     STG 2 Reduce Modified OSWESTRY to 30% or less  -NH     STG 2 Progress Met  -NH     STG 3 Patient will have 4+/5 B hip flexion strength  -NH     STG 3 Progress Met  -NH     STG 4 Patient will be able to tolerate 45 min treatment session without increased pain.   -NH     STG 4 Progress Met  -NH     STG 5 Patient will have resolution of radicular pain in B hips  -NH     STG 5 Progress Progressing  -NH        Long Term Goals    LTG Date to Achieve 03/08/18  -NH     LTG 1 Reduce Modified OSWESTRY to 20% or less  -NH     LTG 1 Progress Progressing  -NH     LTG 2 Patient will be able to demo proper lifting from floor to waist 20# without cuing  -NH     LTG 2 Progress Progressing  -NH     LTG 3 Patient will RTW without restriction  -NH     LTG 3 Progress Progressing  -NH     LTG 4 Patient will have 5/5 B hip flexion strength  -NH     LTG 4 Progress Progressing  -NH     LTG 5 Patient will have 5/5 B hip extension strength  -NH     LTG 5 Progress Progressing  -NH     LTG 6 Patient will improve LB flexibility to mildly restricted and equal bilaterally.  -NH     LTG 6 Progress Progressing  -NH        Time Calculation    PT Goal Re-Cert Due Date 04/06/18  -NH       User Key  (r) = Recorded By, (t) = Taken By, (c) = Cosigned By    Initials Name Provider Type    NH Yaz Tucker, PT Physical Therapist                PT Assessment/Plan     Row Name 03/16/18 1100          PT Assessment    Functional Limitations Limitations in community activities;Limitations in functional capacity and performance;Performance in leisure activities;Performance in work activities;Performance in sport activities  -NH     Impairments Endurance;Impaired flexibility;Pain;Muscle strength;Joint mobility;Poor body mechanics;Posture;Range of motion  -NH     Assessment Comments Patient is progressing well with physical therapy at  this time; however, he cont to display core and LB weakness that limits his capacity for work tasks. Patient has to get back to job with high physical demand. Patient would benefit from skilled physical therapy to address deficits and allow patient to return to work without re-injury.   -NH     Rehab Potential Good  -NH     Patient/caregiver participated in establishment of treatment plan and goals Yes  -NH     Patient would benefit from skilled therapy intervention Yes  -NH        PT Plan    PT Frequency 2x/week  -NH     Predicted Duration of Therapy Intervention (OT Eval) 4 weeks  -NH     PT Plan Comments Progress to more aggressive treatment similar to work conditioning. Hold bike. Have patient start on TM. Emhasis on TA with functional tasks and lifting (i.e. squats, sled, lifting and carry, OH press up).  -NH       User Key  (r) = Recorded By, (t) = Taken By, (c) = Cosigned By    Initials Name Provider Type    NH Yaz Tucker, PT Physical Therapist                Modalities     Row Name 03/16/18 1000             Moist Heat    MH Applied Yes  -NH      Location lumbar- Prone  -NH      MH S/P Rx Yes  -NH         Ice    Location    -NH         ELECTRICAL STIMULATION    Attended/Unattended Unattended  -NH      Stimulation Type IFC  -NH      Location/Electrode Placement/Other Lumbar  -NH      Rx Minutes --   20 min  -NH        User Key  (r) = Recorded By, (t) = Taken By, (c) = Cosigned By    Initials Name Provider Type    NH Yaz Tucker, PT Physical Therapist              Exercises     Row Name 03/16/18 1000             Subjective Comments    Subjective Comments Patient reports he hit his head the other day and fell and has been having pain in the upper back today vs. the normal spot in low back.   -NH         Subjective Pain    Able to rate subjective pain? yes  -NH      Pre-Treatment Pain Level 0  -NH      Post-Treatment Pain Level 0  -NH         Exercise 1    Exercise Name 1 SB wall squats  -NH      Sets 1  2  -NH      Reps 1 10  -NH      Additional Comments Green kettle ball oblique twist  -NH         Exercise 2    Sets 2 2  -NH      Reps 2 10  -NH      Time (Seconds) 2 Modified dead bug (LE only)  -NH         Exercise 3    Reps 3 5 each direction  -NH      Additional Comments 3 plates  -NH      Time (Seconds) 3 Resisted walking  -NH         Exercise 4    Sets 4 2  -NH      Reps 4 10  -NH      Additional Comments 5 plates  -NH      Time (Seconds) 4 TA + Lat pull  -NH         Exercise 5    Sets 5 2  -NH      Reps 5 10  -NH      Time (Seconds) 5 Reverse BOSU squats  -NH         Exercise 6    Exercise Name 6 [place]:  Alt LAQ  -NH      Sets 6 2  -NH      Reps 6 10  -NH         Exercise 7    Exercise Name 7 Swiss: Alt March  -NH      Sets 7 2  -NH      Reps 7 10  -NH         Exercise 8    Exercise Name 8 Swiss: kettle up & outs  -NH      Sets 8 2  -NH      Reps 8 10  -NH         Exercise 9    Exercise Name 9 Swiss: kettle diag chops  -NH      Sets 9 2  -NH      Reps 9 10  -NH        User Key  (r) = Recorded By, (t) = Taken By, (c) = Cosigned By    Initials Name Provider Type    NH Yaz Tucker, PT Physical Therapist                        Outcome Measure Options: Modifed [place]  Modified Oswestry  Modified Oswestry Score/Comments: 30%      Time Calculation:   Start Time: 1018  Stop Time: 1120  Time Calculation (min): 62 min  Total Timed Code Minutes- PT: 47 minute(s)     Therapy Charges for Today     Code Description Service Date Service Provider Modifiers Qty    47425642585 HC PT THER PROC EA 15 MIN 3/16/2018 Yaz Tucker, PT GP 3    19066666887 HC PT THER SUPP EA 15 MIN 3/16/2018 Yaz Tucker, PT GP 1    58083958951 HC PT ELECTRICAL STIM UNATTENDED 3/16/2018 Yaz Tucker, PT  1          PT G-Codes  Outcome Measure Options: Modifed Owestry         Yaz Tucker, PT  3/16/2018

## 2018-03-21 ENCOUNTER — HOSPITAL ENCOUNTER (OUTPATIENT)
Dept: PHYSICAL THERAPY | Facility: HOSPITAL | Age: 43
Setting detail: THERAPIES SERIES
Discharge: HOME | End: 2018-03-21

## 2018-03-21 DIAGNOSIS — M51.26 HERNIATED INTERVERTEBRAL DISC OF LUMBAR SPINE: ICD-10-CM

## 2018-03-21 DIAGNOSIS — M51.26 DISPLACEMENT OF LUMBAR INTERVERTEBRAL DISC WITHOUT MYELOPATHY: Primary | ICD-10-CM

## 2018-03-21 DIAGNOSIS — M51.37 DEGENERATION OF LUMBOSACRAL INTERVERTEBRAL DISC: ICD-10-CM

## 2018-03-21 PROCEDURE — 97110 THERAPEUTIC EXERCISES: CPT | Performed by: PHYSICAL THERAPIST

## 2018-03-21 PROCEDURE — G0283 ELEC STIM OTHER THAN WOUND: HCPCS | Performed by: PHYSICAL THERAPIST

## 2018-03-22 NOTE — THERAPY TREATMENT NOTE
"    Outpatient Physical Therapy Ortho Treatment Note  [facility]     Patient Name: Alin Richard  : 1975  MRN: [id number]  Today's Date: 3/21/2018      Visit Date: 2018     Subjective Improvement:  \"better\"    Attendance:   Approved: 8 visits          MD follow up: ALICIA           date:     18      Visit Dx:    ICD-10-CM ICD-9-CM   1. Displacement of lumbar intervertebral disc without myelopathy M51.26 722.10   2. Herniated intervertebral disc of lumbar spine M51.26 722.10   3. Degeneration of lumbosacral intervertebral disc M51.37 722.52       Patient Active Problem List   Diagnosis   • Displacement of lumbar intervertebral disc without myelopathy   • BMI 38.0-38.9,adult   • Non-smoker   • Renal infarction   • Hypertension   • Herniated intervertebral disc of lumbar spine   • Disease of thyroid gland   • PFO (patent foramen ovale)   • Degeneration of lumbar or lumbosacral intervertebral disc   • Lumbar radiculopathy   • Factor V Leiden   • Migraine        Past Medical History:   Diagnosis Date   • Arthritis    • Depression    • Disease of thyroid gland    • Factor V Leiden    • Hypertension    • Infarction of kidney     BLOOD CLOT IN KIDNEY   • Injury of back    • Murmur, heart    • PTSD (post-traumatic stress disorder)         Past Surgical History:   Procedure Laterality Date   • BACK SURGERY     • LUMBAR DISCECTOMY Bilateral 2017    Procedure: LUMBAR Micro DISCECTOMY LEFT L4-5 and RIGHT L5-S1;  Surgeon: Favio Castillo MD;  Location: [facility];  Service:    • TONSILLECTOMY     • VASECTOMY               PT Ortho     Row Name 18 0800       Precautions and Contraindications    Precautions/Limitations no known precautions/limitations  -KG    Precautions s/p Laminectomy 17  -KG       Subjective Pain    Post-Treatment Pain Level 0  -KG       Posture/Observations    Posture/Observations Comments No acute distress. Non-antalgic gait noted  -KG      User Key  (r) = " Recorded By, (t) = Taken By, (c) = Cosigned By    Initials Name Provider Type    KG Qian Linton PT Physical Therapist                            PT Assessment/Plan     Row Name 03/21/18 0800          PT Assessment    Functional Limitations Limitations in community activities;Limitations in functional capacity and performance;Performance in leisure activities;Performance in work activities;Performance in sport activities  -KG     Impairments Endurance;Impaired flexibility;Pain;Muscle strength;Joint mobility;Poor body mechanics;Posture;Range of motion  -KG     Assessment Comments Pt will good tolerance to new therex this date. Did well with treadmill warm up vs. pro II. Pt does well self-correcting with core activitation & form with reverse BOSU mini-squats. Able to perform sled push/pull with increased pain but faituges easily.  -KG     Rehab Potential Good  -KG     Patient/caregiver participated in establishment of treatment plan and goals Yes  -KG     Patient would benefit from skilled therapy intervention Yes  -KG        PT Plan    PT Frequency 2x/week  -KG     Predicted Duration of Therapy Intervention (OT Eval) 4 weeks  -KG     PT Plan Comments Possible lift & carry next visit. Continue functional work conditioning activities  -KG       User Key  (r) = Recorded By, (t) = Taken By, (c) = Cosigned By    Initials Name Provider Type    KG Qian Linton PT Physical Therapist                Modalities     Row Name 03/21/18 0800             Moist Heat    MH Applied Yes  -KG      Location lumbar- Prone  -KG      Rx Minutes 15 mins  -KG      MH S/P Rx Yes  -KG         ELECTRICAL STIMULATION    Attended/Unattended Unattended  -KG      Stimulation Type IFC  -KG      Location/Electrode Placement/Other Lumbar  -KG      Rx Minutes 15 mins  -KG        User Key  (r) = Recorded By, (t) = Taken By, (c) = Cosigned By    Initials Name Provider Type    DIO Linton PT Physical Therapist                Exercises      Row Name 03/21/18 0800             Subjective Comments    Subjective Comments Pt states the previous treatment went well. Reports some muscle soreness after but no pain. Still having some upper back pain but is getting better.  -KG         Subjective Pain    Able to rate subjective pain? yes  -KG      Pre-Treatment Pain Level 0  -KG      Post-Treatment Pain Level 0  -KG         Aquatics    Aquatics performed? No  -KG         Exercise 1    Exercise Name 1 Treadmill walk warm-up  -KG      Time 1 10 min  -KG      Additional Comments Self-controlled speed  -KG         Exercise 2    Sets 2 1  -KG      Reps 2 5  -KG      Additional Comments 5 plates  -KG      Time (Seconds) 2 CC: Resisted walking 4-way  -KG         Exercise 3    Sets 3 2  -KG      Reps 3 10  -KG      Additional Comments 5 plates  -KG      Time (Seconds) 3 TA + Lat pull  -KG         Exercise 4    Sets 4 2  -KG      Reps 4 10  -KG      Additional Comments Green kettle bell forward raise  -KG      Time (Seconds) 4 SB wall squats  -KG         Exercise 5    Sets 5 2  -KG      Reps 5 10  -KG      Time (Seconds) 5 Reverse BOSU squats  -KG         Exercise 6    Exercise Name 6 Sled push/pull  -KG      Sets 6 1  -KG      Reps 6 5  -KG      Additional Comments 24 feet; 90#  -KG         Exercise 7    Exercise Name 7 Swiss: kettle diag chops  -KG      Sets 7 2  -KG      Reps 7 10  -KG      Additional Comments Green kettle bell  -KG         Exercise 8    Exercise Name 8 Swiss: kettle up & outs  -KG      Sets 8 2  -KG      Reps 8 10  -KG      Additional Comments Green kettle bell  -KG        User Key  (r) = Recorded By, (t) = Taken By, (c) = Cosigned By    Initials Name Provider Type    KG Qian Linton PT Physical Therapist                               PT OP Goals     Row Name 03/21/18 0800          PT Short Term Goals    STG Date to Achieve 03/15/18  -KG     STG 1 IND and compliant with HEP  -KG     STG 1 Progress Met  -KG     STG 2 Reduce Modified OSWESTRY to  30% or less  -KG     STG 2 Progress Met  -KG     STG 3 Patient will have 4+/5 B hip flexion strength  -KG     STG 3 Progress Met  -KG     STG 4 Patient will be able to tolerate 45 min treatment session without increased pain.   -KG     STG 4 Progress Met  -KG     STG 5 Patient will have resolution of radicular pain in B hips  -KG     STG 5 Progress Progressing  -KG        Long Term Goals    LTG Date to Achieve 03/08/18  -KG     LTG 1 Reduce Modified OSWESTRY to 20% or less  -KG     LTG 1 Progress Progressing  -KG     LTG 2 Patient will be able to demo proper lifting from floor to waist 20# without cuing  -KG     LTG 2 Progress Progressing  -KG     LTG 3 Patient will RTW without restriction  -KG     LTG 3 Progress Progressing  -KG     LTG 4 Patient will have 5/5 B hip flexion strength  -KG     LTG 4 Progress Progressing  -KG     LTG 5 Patient will have 5/5 B hip extension strength  -KG     LTG 5 Progress Progressing  -KG     LTG 6 Patient will improve LB flexibility to mildly restricted and equal bilaterally.  -KG     LTG 6 Progress Progressing  -KG        Time Calculation    PT Goal Re-Cert Due Date 04/06/18  -KG       User Key  (r) = Recorded By, (t) = Taken By, (c) = Cosigned By    Initials Name Provider Type    KG Qian Linton, PT Physical Therapist          Therapy Education  Given: HEP, Symptoms/condition management, Posture/body mechanics  Program: Reinforced  How Provided: Verbal  Provided to: Patient  Level of Understanding: Demonstrated, Verbalized              Time Calculation:   Start Time: 0850  Stop Time: 0955  Time Calculation (min): 65 min  Total Timed Code Minutes- PT: 50 minute(s)    Therapy Charges for Today     Code Description Service Date Service Provider Modifiers Qty    17959509833 HC PT THER PROC EA 15 MIN 3/21/2018 Qian Linton, PT GP 3    99712477834 HC PT ELECTRICAL STIM UNATTENDED 3/21/2018 Qian Linton PT  1    20217538401 HC PT THER SUPP EA 15 MIN 3/21/2018 Qian WOLF  Royer, PT GP 1                    Qian Linton, PT  3/21/2018

## 2018-03-28 ENCOUNTER — HOSPITAL ENCOUNTER (OUTPATIENT)
Dept: PHYSICAL THERAPY | Facility: HOSPITAL | Age: 43
Setting detail: THERAPIES SERIES
End: 2018-03-28

## 2018-03-30 ENCOUNTER — HOSPITAL ENCOUNTER (OUTPATIENT)
Dept: PHYSICAL THERAPY | Facility: HOSPITAL | Age: 43
Setting detail: THERAPIES SERIES
End: 2018-03-30

## 2018-04-09 ENCOUNTER — OFFICE VISIT (OUTPATIENT)
Dept: NEUROSURGERY | Facility: CLINIC | Age: 43
End: 2018-04-09

## 2018-04-09 VITALS
DIASTOLIC BLOOD PRESSURE: 88 MMHG | WEIGHT: 302.8 LBS | BODY MASS INDEX: 38.86 KG/M2 | HEIGHT: 74 IN | SYSTOLIC BLOOD PRESSURE: 118 MMHG

## 2018-04-09 DIAGNOSIS — Z78.9 NON-SMOKER: ICD-10-CM

## 2018-04-09 DIAGNOSIS — M51.26 DISPLACEMENT OF LUMBAR INTERVERTEBRAL DISC WITHOUT MYELOPATHY: ICD-10-CM

## 2018-04-09 DIAGNOSIS — M51.37 DEGENERATION OF LUMBAR OR LUMBOSACRAL INTERVERTEBRAL DISC: Primary | ICD-10-CM

## 2018-04-09 PROCEDURE — 99213 OFFICE O/P EST LOW 20 MIN: CPT | Performed by: NURSE PRACTITIONER

## 2018-04-09 NOTE — PROGRESS NOTES
"    Chief complaint:   Chief Complaint   Patient presents with   • Back Pain     Alin returns today for follow up for his back pain, he feels he is doing better, he has been through physical therapy and some pain managment and is here today to discuss getting back to work         Subjective      HPI: This is a 42-year-old male gentleman went to the operating room in December 2017 for a left L4-5 micro-discectomy and right L5-S1 discectomy.  He is here in follow-up today.  He states overall it feels like is doing very well.  Denies any pain.  Is not having any back pain or leg pain or denies any numbness or tingling.  Denies any bowel or bladder incontinence.  He has been going to physical therapy and have been focusing on returning him to work and he feels like this is made a big difference in overall is happy and satisfied with results of the surgery.  Rates the pain on scale 0-10 at a 0.  Denies any issues at this time.  Denies taking any pain medication.    Review of Systems   Musculoskeletal: Negative.    Neurological: Negative.          Objective      Vital Signs  /88 (BP Location: Right arm, Patient Position: Sitting)   Ht 188 cm (74\")   Wt (!) 137 kg (302 lb 12.8 oz)   BMI 38.88 kg/m²     Physical Exam   Constitutional: He is oriented to person, place, and time. He appears well-developed and well-nourished.   HENT:   Head: Normocephalic.   Eyes: Conjunctivae, EOM and lids are normal. Pupils are equal, round, and reactive to light.   Neck: Normal range of motion.   Cardiovascular: Normal rate, regular rhythm and normal heart sounds.    Pulmonary/Chest: Effort normal and breath sounds normal.   Abdominal: Normal appearance.   Musculoskeletal: Normal range of motion.   Neurological: He is alert and oriented to person, place, and time. He has normal strength and normal reflexes. He displays normal reflexes. No cranial nerve deficit or sensory deficit. GCS eye subscore is 4. GCS verbal subscore is 5. GCS " motor subscore is 6.   Skin: Skin is warm.   Psychiatric: He has a normal mood and affect. His speech is normal and behavior is normal. Thought content normal. Cognition and memory are normal.       Results Review: No new imaging          Assessment/Plan: At this point the patient appears to be doing very well.  We can all the patient to go back to work at this time without any restrictions and full duty.  He is told to call us if any further problems.  We will follow-up again with him in 3 months to see how is doing in getting back to work.  BMI shows that is very overweight.  BMI chart was given the patient.  He is a nonsmoker.         Alin was seen today for back pain.    Diagnoses and all orders for this visit:    Degeneration of lumbar or lumbosacral intervertebral disc    Displacement of lumbar intervertebral disc without myelopathy    Non-smoker    BMI 38.0-38.9,adult        I discussed the patients findings and my recommendations with patient  Huber Hyde, WIL  04/09/18  11:49 AM

## 2018-04-09 NOTE — PATIENT INSTRUCTIONS

## 2019-04-17 ENCOUNTER — TRANSCRIBE ORDERS (OUTPATIENT)
Dept: ADMINISTRATIVE | Facility: HOSPITAL | Age: 44
End: 2019-04-17

## 2019-04-17 DIAGNOSIS — M47.816 SPONDYLOSIS WITHOUT MYELOPATHY OR RADICULOPATHY, LUMBAR REGION: Primary | ICD-10-CM

## 2019-04-17 DIAGNOSIS — M47.816 LUMBAR SPONDYLOSIS: Primary | ICD-10-CM

## 2019-04-19 ENCOUNTER — TELEPHONE (OUTPATIENT)
Dept: VASCULAR SURGERY | Facility: CLINIC | Age: 44
End: 2019-04-19

## 2019-04-19 NOTE — TELEPHONE ENCOUNTER
Called pt to give him new appt date and time. However, pt wanted to come 5/9/19 instead, which was fine. I told pt should he have any questions or concerns to give us a call.

## 2019-05-01 ENCOUNTER — APPOINTMENT (OUTPATIENT)
Dept: MRI IMAGING | Facility: HOSPITAL | Age: 44
End: 2019-05-01

## 2019-05-08 ENCOUNTER — TELEPHONE (OUTPATIENT)
Dept: VASCULAR SURGERY | Facility: CLINIC | Age: 44
End: 2019-05-08

## 2019-05-08 NOTE — TELEPHONE ENCOUNTER
Left message reminding Mr Richard of his appointment for Thursday, May 9th, 2019 at 815 am with Dr Ames. Also advised if he had any questions or needed to reschedule to please call the office at [phone].

## 2019-05-09 ENCOUNTER — OFFICE VISIT (OUTPATIENT)
Dept: VASCULAR SURGERY | Facility: CLINIC | Age: 44
End: 2019-05-09

## 2019-05-09 VITALS
BODY MASS INDEX: 40.43 KG/M2 | WEIGHT: 315 LBS | OXYGEN SATURATION: 99 % | HEIGHT: 74 IN | SYSTOLIC BLOOD PRESSURE: 136 MMHG | HEART RATE: 82 BPM | DIASTOLIC BLOOD PRESSURE: 86 MMHG

## 2019-05-09 DIAGNOSIS — I73.00 RAYNAUD'S DISEASE WITHOUT GANGRENE: Primary | ICD-10-CM

## 2019-05-09 PROCEDURE — 99204 OFFICE O/P NEW MOD 45 MIN: CPT | Performed by: SURGERY

## 2019-05-09 RX ORDER — GABAPENTIN 300 MG/1
CAPSULE ORAL
Refills: 2 | COMMUNITY
Start: 2019-03-01 | End: 2021-06-08

## 2019-05-09 RX ORDER — OXYCODONE AND ACETAMINOPHEN 10; 325 MG/1; MG/1
1 TABLET ORAL EVERY 4 HOURS PRN
COMMUNITY
Start: 2019-05-07

## 2019-05-09 NOTE — PROGRESS NOTES
05/09/2019      Lve Stallworth MD  [address]  [facility] [address]  [place] [address]    Alin Richard  1975    Chief Complaint   Patient presents with   • Establish Care     Pt has Raynauds symptoms of the L ring finger.  He has a lot of pain with this during the winter time.  Pt only had blood work.  Pt has factor 5.  Referred by Dr. Lev Stallworth.       Dear Lev Stallworth MD    HPI  I had the pleasure of seeing your patient Alin Richard in the office today.  Thank you kindly for this consultation.  As you recall, Alin Richard is a 44 y.o.  male who you are currently following for routine health maintenance.  He has complaints of Raynaud's symptoms to left ring finger.  He reports in turns completely white during the cold even with gloves.  He does have a history of Factor V Leiden.  He is maintained on Eliquis.      Past Medical History:   Diagnosis Date   • Arthritis    • Depression    • Disease of thyroid gland    • Factor V Leiden (CMS/HCC)    • Hypertension    • Infarction of kidney (CMS/HCC)     BLOOD CLOT IN KIDNEY   • Injury of back    • Murmur, heart    • PTSD (post-traumatic stress disorder)        Past Surgical History:   Procedure Laterality Date   • BACK SURGERY     • LUMBAR DISCECTOMY Bilateral 12/1/2017    Procedure: LUMBAR Micro DISCECTOMY LEFT L4-5 and RIGHT L5-S1;  Surgeon: Favio Castillo MD;  Location: [facility];  Service:    • TONSILLECTOMY     • VASECTOMY         Family History   Problem Relation Age of Onset   • Arthritis Mother    • Hypertension Mother    • Clotting disorder Mother    • Aneurysm Father    • No Known Problems Sister    • No Known Problems Brother        Social History     Socioeconomic History   • Marital status:      Spouse name: Not on file   • Number of children: Not on file   • Years of education: Not on file   • Highest education level: Not on file   Tobacco Use   • Smoking status: Former Smoker     Packs/day: 1.00     Years: 5.00     Pack years:  "5.00     Types: Cigarettes   • Smokeless tobacco: Current User     Types: Snuff   Substance and Sexual Activity   • Alcohol use: Yes     Comment: occassional/ WEEKENDS   • Drug use: No   • Sexual activity: Yes     Partners: Female       Allergies   Allergen Reactions   • Eggs Or Egg-Derived Products Nausea And Vomiting       Current Outpatient Medications:   •  apixaban (ELIQUIS) 5 MG tablet tablet, Take 1 tablet by mouth 2 (Two) Times a Day., Disp: 60 tablet, Rfl: 2  •  B-D 3CC LUER-SRIDHAR SYR 30ID0-9/2 22G X 1-1/2\" 3 ML misc, USE AS DIRECTED, Disp: , Rfl: 1  •  gabapentin (NEURONTIN) 300 MG capsule, TAKE ONE CAPSULE BY MOUTH EVERY 8 HOURS AS NEEDED, Disp: , Rfl: 2  •  levothyroxine (SYNTHROID, LEVOTHROID) 50 MCG tablet, TAKE 1 TABLET BY MOUTH DAILY, Disp: , Rfl: 11  •  lisinopril (PRINIVIL,ZESTRIL) 10 MG tablet, TAKE 1 TABLET EVERY DAY, Disp: , Rfl: 5  •  oxyCODONE-acetaminophen (PERCOCET) 7.5-325 MG per tablet, , Disp: , Rfl:   •  Testosterone Cypionate (DEPOTESTOTERONE CYPIONATE) 200 MG/ML injection, , Disp: , Rfl:   •  tiZANidine (ZANAFLEX) 4 MG tablet, TAKE 1 TABLET BY MOUTH EVERY 8 HOURS AS NEEDED, Disp: , Rfl: 1  •  topiramate (TOPAMAX) 50 MG tablet, , Disp: , Rfl:       Review of Systems   Constitutional: Negative.    HENT: Negative.    Eyes: Negative.    Respiratory: Negative.    Cardiovascular: Negative.    Gastrointestinal: Negative.    Endocrine: Negative.    Genitourinary: Negative.    Musculoskeletal: Negative.    Skin: Positive for color change (left ring finger).   Allergic/Immunologic: Negative.    Neurological: Negative.    Hematological: Negative.    Psychiatric/Behavioral: Negative.    All other systems reviewed and are negative.    /86   Pulse 82   Ht 188 cm (74\")   Wt (!) 144 kg (317 lb)   SpO2 99%   BMI 40.70 kg/m²     Physical Exam   Constitutional: He is oriented to person, place, and time. He appears well-developed and well-nourished.   HENT:   Head: Normocephalic and atraumatic. "   Eyes: Pupils are equal, round, and reactive to light. No scleral icterus.   Neck: Neck supple. No JVD present. Carotid bruit is not present. No thyromegaly present.   Cardiovascular: Normal rate, regular rhythm and normal heart sounds.   Pulses:       Carotid pulses are 2+ on the right side, and 2+ on the left side.       Femoral pulses are 2+ on the right side, and 2+ on the left side.       Popliteal pulses are 2+ on the right side, and 2+ on the left side.        Dorsalis pedis pulses are 2+ on the right side, and 2+ on the left side.        Posterior tibial pulses are 2+ on the right side, and 2+ on the left side.   Pulmonary/Chest: Effort normal and breath sounds normal.   Abdominal: Soft. Bowel sounds are normal. He exhibits no distension, no abdominal bruit and no mass. There is no hepatosplenomegaly. There is no tenderness.   Musculoskeletal: Normal range of motion. He exhibits no edema.   Lymphadenopathy:     He has no cervical adenopathy.   Neurological: He is alert and oriented to person, place, and time. He has normal strength. No cranial nerve deficit or sensory deficit.   Skin: Skin is warm, dry and intact.   Psychiatric: He has a normal mood and affect. His behavior is normal. Judgment and thought content normal.   Nursing note and vitals reviewed.      Patient Active Problem List   Diagnosis   • Displacement of lumbar intervertebral disc without myelopathy   • BMI 38.0-38.9,adult   • Non-smoker   • Renal infarction (CMS/HCC)   • Hypertension   • Herniated intervertebral disc of lumbar spine   • Disease of thyroid gland   • PFO (patent foramen ovale)   • Degeneration of lumbar or lumbosacral intervertebral disc   • Lumbar radiculopathy   • Factor V Leiden (CMS/HCC)   • Migraine        Diagnosis Plan   1. Raynaud's disease without gangrene         Plan: After thoroughly evaluating Alin Richard, I believe the best course of action is to remain conservative from a vascular standpoint.  We did  recommend he keep his hands nice and warm.  He does not have difficulties during the summer but if he has trouble during the winter months, he could try calcium channel blocker, such as Norvasc.  Overall, his hands and fingers are nice and warm with palpable pulses.  He can follow up as needed.  The patient is to continue taking their medications as previously discussed.   This was all discussed in full with complete understanding.  Thank you for allowing me to participate in the care of your patient.  Please do not hesitate to call with any questions or concerns.  We will keep you aware of any further encounters with Alin Richard.        Sincerely yours,         DO Federica Lopez Danny Neale, MD

## 2019-05-09 NOTE — PATIENT INSTRUCTIONS
Raynaud Phenomenon  Raynaud phenomenon is a condition that affects the blood vessels (arteries) that carry blood to your fingers and toes. The arteries that supply blood to your ears, lips, nipples, or the tip of your nose might also be affected. Raynaud phenomenon causes the arteries to become narrow temporarily (spasm). As a result, the flow of blood to the affected areas is temporarily decreased. This usually occurs in response to cold temperatures or stress. During an attack, the skin in the affected areas turns white, then blue, and finally red. You may also feel tingling or numbness in those areas.  Attacks usually last for only a brief period, and then the blood flow to the area returns to normal. In most cases, Raynaud phenomenon does not cause serious health problems.  What are the causes?  In many cases, the cause of this condition is not known. The condition may occur on its own (primary Raynaud phenomenon) or may be associated with other diseases or factors (secondary Raynaud phenomenon).  Possible causes may include:  · Diseases or medical conditions that damage the arteries.  · Injuries and repetitive actions that hurt the hands or feet.  · Being exposed to certain chemicals.  · Taking medicines that narrow the arteries.  · Other medical conditions, such as lupus, scleroderma, rheumatoid arthritis, thyroid problems, blood disorders, Sjogren syndrome, or atherosclerosis.    What increases the risk?  The following factors may make you more likely to develop this condition:  · Being 20-40 years old.  · Being female.  · Having a family history of Raynaud phenomenon.  · Living in a cold climate.  · Smoking.    What are the signs or symptoms?  Symptoms of this condition usually occur when you are exposed to cold temperatures or when you have emotional stress. The symptoms may last for a few minutes or up to several hours. They usually affect your fingers but may also affect your toes, nipples, lips, ears, or  the tip of your nose. Symptoms may include:  · Changes in skin color. The skin in the affected areas will turn pale or white. The skin may then change from white to bluish to red as normal blood flow returns to the area.  · Numbness, tingling, or pain in the affected areas.    In severe cases, symptoms may include:  · Skin sores.  · Tissues decaying and dying (gangrene).    How is this diagnosed?  This condition may be diagnosed based on:  · Your symptoms and medical history.  · A physical exam. During the exam, you may be asked to put your hands in cold water to check for a reaction to cold temperature.  · Tests, such as:  ? Blood tests to check for other diseases or conditions.  ? A test to check the movement of blood through your arteries and veins (vascular ultrasound).  ? A test in which the skin at the base of your fingernail is examined under a microscope (nailfold capillaroscopy).    How is this treated?  Treatment for this condition often involves making lifestyle changes and taking steps to control your exposure to cold temperatures. For more severe cases, medicine (calcium channel blockers) may be used to improve blood flow. Surgery is sometimes done to block the nerves that control the affected arteries, but this is rare.  Follow these instructions at home:  Avoiding cold temperatures  Take these steps to avoid exposure to cold:  · If possible, stay indoors during cold weather.  · When you go outside during cold weather, dress in layers and wear mittens, a hat, a scarf, and warm footwear.  · Wear mittens or gloves when handling ice or frozen food.  · Use holders for glasses or cans containing cold drinks.  · Let warm water run for a while before taking a shower or bath.  · Warm up the car before driving in cold weather.    Lifestyle  · If possible, avoid stressful and emotional situations. Try to find ways to manage your stress, such as:  ? Exercise.  ? Yoga.  ? Meditation.  ? Biofeedback.  · Do not use  any products that contain nicotine or tobacco, such as cigarettes and e-cigarettes. If you need help quitting, ask your health care provider.  · Avoid secondhand smoke.  · Limit your use of caffeine.  ? Switch to decaffeinated coffee, tea, and soda.  ? Avoid chocolate.  · Avoid vibrating tools and machinery.  General instructions  · Protect your hands and feet from injuries, cuts, or bruises.  · Avoid wearing tight rings or wristbands.  · Wear loose fitting socks and comfortable, roomy shoes.  · Take over-the-counter and prescription medicines only as told by your health care provider.  Contact a health care provider if:  · Your discomfort becomes worse despite lifestyle changes.  · You develop sores on your fingers or toes that do not heal.  · Your fingers or toes turn black.  · You have breaks in the skin on your fingers or toes.  · You have a fever.  · You have pain or swelling in your joints.  · You have a rash.  · Your symptoms occur on only one side of your body.  Summary  · Raynaud phenomenon is a condition that affects the arteries that carry blood to your fingers, toes, ears, lips, nipples, or the tip of your nose.  · In many cases, the cause of this condition is not known.  · Symptoms of this condition include changes in skin color, and numbness and tingling of the affected area.  · Treatment for this condition includes lifestyle changes, reducing exposure to cold temperatures, and using medicines for severe cases of the condition.  · Contact your health care provider if your condition worsens despite treatment.  This information is not intended to replace advice given to you by your health care provider. Make sure you discuss any questions you have with your health care provider.  Document Released: 12/15/2001 Document Revised: 07/03/2018 Document Reviewed: 01/29/2018  [employer] Patient Education © 2019 [employer]

## 2019-05-10 ENCOUNTER — HOSPITAL ENCOUNTER (OUTPATIENT)
Dept: MRI IMAGING | Facility: HOSPITAL | Age: 44
Discharge: HOME | End: 2019-05-10
Admitting: NURSE PRACTITIONER

## 2019-05-10 ENCOUNTER — HOSPITAL ENCOUNTER (OUTPATIENT)
Dept: GENERAL RADIOLOGY | Facility: HOSPITAL | Age: 44
Discharge: HOME | End: 2019-05-10

## 2019-05-10 DIAGNOSIS — M47.816 LUMBAR SPONDYLOSIS: ICD-10-CM

## 2019-05-10 DIAGNOSIS — M47.816 SPONDYLOSIS WITHOUT MYELOPATHY OR RADICULOPATHY, LUMBAR REGION: ICD-10-CM

## 2019-05-10 PROCEDURE — 72148 MRI LUMBAR SPINE W/O DYE: CPT

## 2019-05-10 PROCEDURE — 72110 X-RAY EXAM L-2 SPINE 4/>VWS: CPT

## 2019-05-21 NOTE — ASSESSMENT & PLAN NOTE
Bouchra Starkey presents today for   Chief Complaint   Patient presents with    Cold Symptoms     cough, congestion, sore throat, sinus pain and pressure started 5/18/19 has taken dayquil/nyquil              Depression Screening:  3 most recent PHQ Screens 5/21/2019   Little interest or pleasure in doing things Not at all   Feeling down, depressed, irritable, or hopeless Not at all   Total Score PHQ 2 0       Learning Assessment:  Learning Assessment 5/21/2019   PRIMARY LEARNER Patient   HIGHEST LEVEL OF EDUCATION - PRIMARY LEARNER  [id number] PRIMARY LEARNER NONE   CO-LEARNER CAREGIVER No   PRIMARY LANGUAGE ENGLISH   LEARNER PREFERENCE PRIMARY DEMONSTRATION   ANSWERED BY patient   RELATIONSHIP SELF       Abuse Screening:  Abuse Screening Questionnaire 5/21/2019   Do you ever feel afraid of your partner? N   Are you in a relationship with someone who physically or mentally threatens you? N   Is it safe for you to go home? Y       Fall Risk  No flowsheet data found. Coordination of Care:  1. Have you been to the ER, urgent care clinic since your last visit? no   Hospitalized since your last visit? no    2. Have you seen or consulted any other health care providers outside of the [address] [place] [address] since your last visit? Include any pap smears or colon screening.  no Well-controlled on lisinopril 10 mg daily; continue to follow with Dr. Stallworth.

## 2020-03-22 NOTE — PLAN OF CARE
Problem: Patient Care Overview (Adult)  Goal: Plan of Care Review  Outcome: Ongoing (interventions implemented as appropriate)    12/01/17 1410   Coping/Psychosocial Response Interventions   Plan Of Care Reviewed With patient   Patient Care Overview   Progress improving   Outcome Evaluation   Outcome Summary/Follow up Plan Flacc-1, complaint of pain upon awaking with immediate return to sleep. PACU dc criteria met.         Problem: Perioperative Period (Adult)  Goal: Signs and Symptoms of Listed Potential Problems Will be Absent or Manageable (Perioperative Period)  Outcome: Ongoing (interventions implemented as appropriate)       Internal Medicine

## 2021-05-03 ENCOUNTER — TELEPHONE (OUTPATIENT)
Dept: VASCULAR SURGERY | Facility: CLINIC | Age: 46
End: 2021-05-03

## 2021-05-03 ENCOUNTER — HOSPITAL ENCOUNTER (OUTPATIENT)
Facility: HOSPITAL | Age: 46
Setting detail: SURGERY ADMIT
End: 2021-05-03
Attending: ORTHOPAEDIC SURGERY | Admitting: ORTHOPAEDIC SURGERY

## 2021-05-03 NOTE — TELEPHONE ENCOUNTER
Spoke with patient and advised of upcoming appointment with Dr. Ames in reference to surgery with Dr. Ames.  Patient expressed understanding for all that was discussed and all information was mailed.

## 2021-05-26 ENCOUNTER — TELEPHONE (OUTPATIENT)
Dept: VASCULAR SURGERY | Facility: CLINIC | Age: 46
End: 2021-05-26

## 2021-06-02 ENCOUNTER — APPOINTMENT (OUTPATIENT)
Dept: PREADMISSION TESTING | Facility: HOSPITAL | Age: 46
End: 2021-06-02

## 2021-06-08 ENCOUNTER — PRE-ADMISSION TESTING (OUTPATIENT)
Dept: PREADMISSION TESTING | Facility: HOSPITAL | Age: 46
End: 2021-06-08

## 2021-06-08 ENCOUNTER — HOSPITAL ENCOUNTER (OUTPATIENT)
Dept: GENERAL RADIOLOGY | Facility: HOSPITAL | Age: 46
Discharge: HOME | End: 2021-06-08

## 2021-06-08 ENCOUNTER — OFFICE VISIT (OUTPATIENT)
Dept: VASCULAR SURGERY | Facility: CLINIC | Age: 46
End: 2021-06-08

## 2021-06-08 VITALS
SYSTOLIC BLOOD PRESSURE: 149 MMHG | BODY MASS INDEX: 41.75 KG/M2 | HEIGHT: 73 IN | OXYGEN SATURATION: 94 % | RESPIRATION RATE: 16 BRPM | DIASTOLIC BLOOD PRESSURE: 93 MMHG | WEIGHT: 315 LBS | HEART RATE: 87 BPM

## 2021-06-08 VITALS
OXYGEN SATURATION: 95 % | DIASTOLIC BLOOD PRESSURE: 90 MMHG | HEIGHT: 74 IN | HEART RATE: 88 BPM | SYSTOLIC BLOOD PRESSURE: 142 MMHG | WEIGHT: 315 LBS | BODY MASS INDEX: 40.43 KG/M2

## 2021-06-08 DIAGNOSIS — G89.29 CHRONIC MIDLINE LOW BACK PAIN, UNSPECIFIED WHETHER SCIATICA PRESENT: Primary | ICD-10-CM

## 2021-06-08 DIAGNOSIS — D68.51 FACTOR V LEIDEN (HCC): ICD-10-CM

## 2021-06-08 DIAGNOSIS — M54.50 CHRONIC MIDLINE LOW BACK PAIN, UNSPECIFIED WHETHER SCIATICA PRESENT: Primary | ICD-10-CM

## 2021-06-08 LAB
ALBUMIN SERPL-MCNC: 4.2 G/DL (ref 3.5–5.2)
ALBUMIN/GLOB SERPL: 1.2 G/DL
ALP SERPL-CCNC: 116 U/L (ref 39–117)
ALT SERPL W P-5'-P-CCNC: 44 U/L (ref 1–41)
ANION GAP SERPL CALCULATED.3IONS-SCNC: 10 MMOL/L (ref 5–15)
APTT PPP: 22.5 SECONDS (ref 24.1–35)
AST SERPL-CCNC: 32 U/L (ref 1–40)
BACTERIA UR QL AUTO: ABNORMAL /HPF
BILIRUB SERPL-MCNC: 0.6 MG/DL (ref 0–1.2)
BILIRUB UR QL STRIP: NEGATIVE
BUN SERPL-MCNC: 12 MG/DL (ref 6–20)
BUN/CREAT SERPL: 13 (ref 7–25)
CALCIUM SPEC-SCNC: 9.6 MG/DL (ref 8.6–10.5)
CHLORIDE SERPL-SCNC: 103 MMOL/L (ref 98–107)
CLARITY UR: CLEAR
CO2 SERPL-SCNC: 26 MMOL/L (ref 22–29)
COLOR UR: YELLOW
CREAT SERPL-MCNC: 0.92 MG/DL (ref 0.76–1.27)
DEPRECATED RDW RBC AUTO: 40.7 FL (ref 37–54)
ERYTHROCYTE [DISTWIDTH] IN BLOOD BY AUTOMATED COUNT: 12.7 % (ref 12.3–15.4)
GFR SERPL CREATININE-BSD FRML MDRD: 88 ML/MIN/1.73
GLOBULIN UR ELPH-MCNC: 3.6 GM/DL
GLUCOSE SERPL-MCNC: 101 MG/DL (ref 65–99)
GLUCOSE UR STRIP-MCNC: NEGATIVE MG/DL
HCT VFR BLD AUTO: 42.4 % (ref 37.5–51)
HGB BLD-MCNC: 13.8 G/DL (ref 13–17.7)
HGB UR QL STRIP.AUTO: NEGATIVE
HYALINE CASTS UR QL AUTO: ABNORMAL /LPF
INR PPP: 1.02 (ref 0.91–1.09)
KETONES UR QL STRIP: NEGATIVE
LEUKOCYTE ESTERASE UR QL STRIP.AUTO: ABNORMAL
MCH RBC QN AUTO: 28.5 PG (ref 26.6–33)
MCHC RBC AUTO-ENTMCNC: 32.5 G/DL (ref 31.5–35.7)
MCV RBC AUTO: 87.6 FL (ref 79–97)
NITRITE UR QL STRIP: NEGATIVE
PH UR STRIP.AUTO: <=5 [PH] (ref 5–8)
PLATELET # BLD AUTO: 250 10*3/MM3 (ref 140–450)
PMV BLD AUTO: 9.7 FL (ref 6–12)
POTASSIUM SERPL-SCNC: 3.9 MMOL/L (ref 3.5–5.2)
PROT SERPL-MCNC: 7.8 G/DL (ref 6–8.5)
PROT UR QL STRIP: NEGATIVE
PROTHROMBIN TIME: 12.6 SECONDS (ref 11.5–13.4)
RBC # BLD AUTO: 4.84 10*6/MM3 (ref 4.14–5.8)
RBC # UR: ABNORMAL /HPF
REF LAB TEST METHOD: ABNORMAL
SODIUM SERPL-SCNC: 139 MMOL/L (ref 136–145)
SP GR UR STRIP: 1.02 (ref 1–1.03)
SQUAMOUS #/AREA URNS HPF: ABNORMAL /HPF
UROBILINOGEN UR QL STRIP: ABNORMAL
WBC # BLD AUTO: 8.83 10*3/MM3 (ref 3.4–10.8)
WBC UR QL AUTO: ABNORMAL /HPF

## 2021-06-08 PROCEDURE — 36415 COLL VENOUS BLD VENIPUNCTURE: CPT

## 2021-06-08 PROCEDURE — 93010 ELECTROCARDIOGRAM REPORT: CPT | Performed by: INTERNAL MEDICINE

## 2021-06-08 PROCEDURE — 93005 ELECTROCARDIOGRAM TRACING: CPT

## 2021-06-08 PROCEDURE — 99214 OFFICE O/P EST MOD 30 MIN: CPT | Performed by: SURGERY

## 2021-06-08 PROCEDURE — 81001 URINALYSIS AUTO W/SCOPE: CPT

## 2021-06-08 PROCEDURE — 80053 COMPREHEN METABOLIC PANEL: CPT

## 2021-06-08 PROCEDURE — 71045 X-RAY EXAM CHEST 1 VIEW: CPT

## 2021-06-08 PROCEDURE — 85027 COMPLETE CBC AUTOMATED: CPT

## 2021-06-08 PROCEDURE — 85730 THROMBOPLASTIN TIME PARTIAL: CPT

## 2021-06-08 PROCEDURE — 85610 PROTHROMBIN TIME: CPT

## 2021-06-08 RX ORDER — PAROXETINE HYDROCHLORIDE 20 MG/1
20 TABLET, FILM COATED ORAL EVERY MORNING
COMMUNITY

## 2021-06-08 RX ORDER — CYCLOBENZAPRINE HCL 10 MG
10 TABLET ORAL NIGHTLY
COMMUNITY
End: 2021-06-19 | Stop reason: HOSPADM

## 2021-06-08 NOTE — DISCHARGE INSTRUCTIONS
DAY OF SURGERY INSTRUCTIONS        YOUR SURGEON: Dr. Kristopher Mirza / Dr. Kristopher Ames    PROCEDURE: Anterior decompression, anterior lumbar interbody fusion with instrumentation lumbar 5 - sacral 1    DATE OF SURGERY: Wednesday June 16, 2021    ARRIVAL TIME: AS DIRECTED BY OFFICE    YOU MAY TAKE THE FOLLOWING MEDICATION(S) THE MORNING OF SURGERY WITH A SIP OF WATER: Oxycodone/Acetaminophen (PERCOCET) if needed for pain    Do NOT take your Lisinopril (ZESTRIL) within 24 hours of surgery      ALL OTHER HOME MEDICATION CHECK WITH YOUR PHYSICIAN      DO NOT TAKE ANY ERECTILE DYSFUNCTION MEDICATIONS (EX: CIALIS, VIAGRA) 24 HOURS PRIOR TO SURGERY                      MANAGING PAIN AFTER SURGERY    We know you are probably wondering what your pain will be like after surgery.  Following surgery it is unrealistic to expect you will not have pain.   Pain is how our bodies let us know that something is wrong or cautions us to be careful.  That said, our goal is to make your pain tolerable.    Methods we may use to treat your pain include (oral or IV medications, PCAs, epidurals, nerve blocks, etc.)   While some procedures require IV pain medications for a short time after surgery, transitioning to pain medications by mouth allows for better management of pain.   Your nurse will encourage you to take oral pain medications whenever possible.  IV medications work almost immediately, but only last a short while.  Taking medications by mouth allows for a more constant level of medication in your blood stream for a longer period of time.      Once your pain is out of control it is harder to get back under control.  It is important you are aware when your next dose of pain medication is due.  If you are admitted, your nurse may write the time of your next dose on the white board in your room to help you remember.      We are interested in your pain and encourage you to inform us about aggravating factors during your visit.    Many times a simple repositioning every few hours can make a big difference.    If your physician says it is okay, do not let your pain prevent you from getting out of bed. Be sure to call your nurse for assistance prior to getting up so you do not fall.      Before surgery, please decide your tolerable pain goal.  These faces help describe the pain ratings we use on a 0-10 scale.   Be prepared to tell us your goal and whether or not you take pain or anxiety medications at home.          BEFORE YOU COME TO THE HOSPITAL  (Pre-op instructions)  • Do not eat, drink, smoke or chew gum after midnight the night before surgery.  This also includes no mints.  • Morning of surgery take only the medicines you have been instructed with a sip of water unless otherwise instructed  by your physician.  • Do not shave, wear makeup or dark nail polish.  • Remove all jewelry including rings.  • Leave anything you consider valuable at home.  • Leave your suitcase in the car until after your surgery.  • Bring the following with you if applicable:  o Picture ID and insurance, Medicare or Medicaid cards  o Co-pay/deductible required by insurance (cash, check, credit card)  o Copy of advance directive, living will or power-of- documents if not brought to PAT  o CPAP or BIPAP mask and tubing  o Relaxation aids ( book, magazine), etc.  o Hearing aids                        ON THE DAY OF SURGERY  · On the day of surgery check in at registration located at the main entrance of the hospital.   ? You will be registered and given a beeper with instructions where to wait in the main lobby.  ? When your beeper lights up and vibrates a member of the Outpatient Surgery staff will meet you at the double doors under the stair steps and escort you to your preoperative room.   · You may have cloth compression devices placed on your legs. These help to prevent blood clots and reduce swelling in your legs.  · An IV may be inserted into one of your  "veins.  · In the operating room, you may be given one or more of the following:  ? A medicine to help you relax (sedative).  ? A medicine to numb the area (local anesthetic).  ? A medicine to make you fall asleep (general anesthetic).  ? A medicine that is injected into an area of your body to numb everything below the injection site (regional anesthetic).  · Your surgical site will be marked or identified.  · You may be given an antibiotic through your IV to help prevent infection.  Contact a health care provider if you:  · Develop a fever of more than 100.4°F (38°C) or other feelings of illness during the 48 hours before your surgery.  · Have symptoms that get worse.  Have questions or concerns about your surgery    General Anesthesia/Surgery, Adult  General anesthesia is the use of medicines to make a person \"go to sleep\" (unconscious) for a medical procedure. General anesthesia must be used for certain procedures, and is often recommended for procedures that:  · Last a long time.  · Require you to be still or in an unusual position.  · Are major and can cause blood loss.  The medicines used for general anesthesia are called general anesthetics. As well as making you unconscious for a certain amount of time, these medicines:  · Prevent pain.  · Control your blood pressure.  · Relax your muscles.  Tell a health care provider about:  · Any allergies you have.  · All medicines you are taking, including vitamins, herbs, eye drops, creams, and over-the-counter medicines.  · Any problems you or family members have had with anesthetic medicines.  · Types of anesthetics you have had in the past.  · Any blood disorders you have.  · Any surgeries you have had.  · Any medical conditions you have.  · Any recent upper respiratory, chest, or ear infections.  · Any history of:  ? Heart or lung conditions, such as heart failure, sleep apnea, asthma, or chronic obstructive pulmonary disease (COPD).  ?  " service.  ? Depression or anxiety.  · Any tobacco or drug use, including marijuana or alcohol use.  · Whether you are pregnant or may be pregnant.  What are the risks?  Generally, this is a safe procedure. However, problems may occur, including:  · Allergic reaction.  · Lung and heart problems.  · Inhaling food or liquid from the stomach into the lungs (aspiration).  · Nerve injury.  · Air in the bloodstream, which can lead to stroke.  · Extreme agitation or confusion (delirium) when you wake up from the anesthetic.  · Waking up during your procedure and being unable to move. This is rare.  These problems are more likely to develop if you are having a major surgery or if you have an advanced or serious medical condition. You can prevent some of these complications by answering all of your health care provider's questions thoroughly and by following all instructions before your procedure.  General anesthesia can cause side effects, including:  · Nausea or vomiting.  · A sore throat from the breathing tube.  · Hoarseness.  · Wheezing or coughing.  · Shaking chills.  · Tiredness.  · Body aches.  · Anxiety.  · Sleepiness or drowsiness.  · Confusion or agitation.  RISKS AND COMPLICATIONS OF SURGERY  Your health care provider will discuss possible risks and complications with you before surgery. Common risks and complications include:    · Problems due to the use of anesthetics.  · Blood loss and replacement (does not apply to minor surgical procedures).  · Temporary increase in pain due to surgery.  · Uncorrected pain or problems that the surgery was meant to correct.  · Infection.  · New damage.    What happens before the procedure?    Medicines  Ask your health care provider about:  · Changing or stopping your regular medicines. This is especially important if you are taking diabetes medicines or blood thinners.  · Taking medicines such as aspirin and ibuprofen. These medicines can thin your blood. Do not take these  medicines unless your health care provider tells you to take them.  · Taking over-the-counter medicines, vitamins, herbs, and supplements. Do not take these during the week before your procedure unless your health care provider approves them.  General instructions  · Starting 3-6 weeks before the procedure, do not use any products that contain nicotine or tobacco, such as cigarettes and e-cigarettes. If you need help quitting, ask your health care provider.  · If you brush your teeth on the morning of the procedure, make sure to spit out all of the toothpaste.  · Tell your health care provider if you become ill or develop a cold, cough, or fever.  · If instructed by your health care provider, bring your sleep apnea device with you on the day of your surgery (if applicable).  · Ask your health care provider if you will be going home the same day, the following day, or after a longer hospital stay.  ? Plan to have someone take you home from the hospital or clinic.  ? Plan to have a responsible adult care for you for at least 24 hours after you leave the hospital or clinic. This is important.  What happens during the procedure?  · You will be given anesthetics through both of the following:  ? A mask placed over your nose and mouth.  ? An IV in one of your veins.  · You may receive a medicine to help you relax (sedative).  · After you are unconscious, a breathing tube may be inserted down your throat to help you breathe. This will be removed before you wake up.  · An anesthesia specialist will stay with you throughout your procedure. He or she will:  ? Keep you comfortable and safe by continuing to give you medicines and adjusting the amount of medicine that you get.  ? Monitor your blood pressure, pulse, and oxygen levels to make sure that the anesthetics do not cause any problems.  The procedure may vary among health care providers and hospitals.  What happens after the procedure?  · Your blood pressure, temperature,  heart rate, breathing rate, and blood oxygen level will be monitored until the medicines you were given have worn off.  · You will wake up in a recovery area. You may wake up slowly.  · If you feel anxious or agitated, you may be given medicine to help you calm down.  · If you will be going home the same day, your health care provider may check to make sure you can walk, drink, and urinate.  · Your health care provider will treat any pain or side effects you have before you go home.  · Do not drive for 24 hours if you were given a sedative.  Summary  · General anesthesia is used to keep you still and prevent pain during a procedure.  · It is important to tell your healthcare provider about your medical history and any surgeries you have had, and previous experience with anesthesia.  · Follow your healthcare provider’s instructions about when to stop eating, drinking, or taking certain medicines before your procedure.  · Plan to have someone take you home from the hospital or clinic.  This information is not intended to replace advice given to you by your health care provider. Make sure you discuss any questions you have with your health care provider.  Document Released: 03/26/2009 Document Revised: 08/03/2018 Document Reviewed: 08/03/2018  [employer] Interactive Patient Education © 2019 [employer]       Fall Prevention in Hospitals, Adult  As a hospital patient, your condition and the treatments you receive can increase your risk for falls. Some additional risk factors for falls in a hospital include:  · Being in an unfamiliar environment.  · Being on bed rest.  · Your surgery.  · Taking certain medicines.  · Your tubing requirements, such as intravenous (IV) therapy or catheters.  It is important that you learn how to decrease fall risks while at the hospital. Below are important tips that can help prevent falls.  SAFETY TIPS FOR PREVENTING FALLS  Talk about your risk of falling.  · Ask your health care provider  why you are at risk for falling. Is it your medicine, illness, tubing placement, or something else?  · Make a plan with your health care provider to keep you safe from falls.  · Ask your health care provider or pharmacist about side effects of your medicines. Some medicines can make you dizzy or affect your coordination.  Ask for help.  · Ask for help before getting out of bed. You may need to press your call button.  · Ask for assistance in getting safely to the toilet.  · Ask for a walker or cane to be put at your bedside. Ask that most of the side rails on your bed be placed up before your health care provider leaves the room.  · Ask family or friends to sit with you.  · Ask for things that are out of your reach, such as your glasses, hearing aids, telephone, bedside table, or call button.  Follow these tips to avoid falling:  · Stay lying or seated, rather than standing, while waiting for help.  · Wear rubber-soled slippers or shoes whenever you walk in the hospital.  · Avoid quick, sudden movements.  ¨ Change positions slowly.  ¨ Sit on the side of your bed before standing.  ¨ Stand up slowly and wait before you start to walk.  · Let your health care provider know if there is a spill on the floor.  · Pay careful attention to the medical equipment, electrical cords, and tubes around you.  · When you need help, use your call button by your bed or in the bathroom. Wait for one of your health care providers to help you.  · If you feel dizzy or unsure of your footing, return to bed and wait for assistance.  · Avoid being distracted by the TV, telephone, or another person in your room.  · Do not lean or support yourself on rolling objects, such as IV poles or bedside tables.     This information is not intended to replace advice given to you by your health care provider. Make sure you discuss any questions you have with your health care provider.     Document Released: 12/15/2001 Document Revised: 01/08/2016 Document  Reviewed: 08/25/2013  [employer] Interactive Patient Education ©2016 [employer]       [facility]  CHG 4% Patient Instruction Sheet    Chlorhexidine Before Surgery  Chlorhexidine gluconate (CHG) is a germ-killing (antiseptic) solution that is used to clean the skin. It gets rid of the bacteria that normally live on the skin. Cleaning your skin with CHG before surgery helps lower the risk for infection after surgery.    How to use CHG solution  · You will take 2 showers, one shower the night before surgery, the second shower the morning of surgery before coming to the hospital.  · Use CHG only as told by your health care provider, and follow the instructions on the label.  · Use CHG solution while taking a shower. Follow these steps when using CHG solution (unless your health care provider gives you different instructions):  1. Start the shower.  2. Use your normal soap and shampoo to wash your face and hair.  3. Turn off the shower or move out of the shower stream.  4. Pour the CHG onto a clean washcloth. Do not use any type of brush or rough-edged sponge.  5. Starting at your neck, lather your body down to your toes. Make sure you:  6. Pay special attention to the part of your body where you will be having surgery. Scrub this area for at least 1 minute.  7. Use the full amount of CHG as directed. Usually, this is one half bottle for each shower.  8. Do not use CHG on your head or face. If the solution gets into your ears or eyes, rinse them well with water.  9. Avoid your genital area.  10. Avoid any areas of skin that have broken skin, cuts, or scrapes.  11. Scrub your back and under your arms. Make sure to wash skin folds.  12. Let the lather sit on your skin for 1-2 minutes or as long as told by your health care  provider.  13. Thoroughly rinse your entire body in the shower. Make sure that all body creases and crevices are rinsed well.  14. Dry off with a clean towel. Do not put any substances on  your body afterward, such as powder, lotion, or perfume.  15. Put on clean clothes or pajamas.  16. If it is the night before your surgery, sleep in clean sheets.    What are the risks?  Risks of using CHG include:  · A skin reaction.  · Hearing loss, if CHG gets in your ears.  · Eye injury, if CHG gets in your eyes and is not rinsed out.  · The CHG product catching fire.  Make sure that you avoid smoking and flames after applying CHG to your skin.  Do not use CHG:  · If you have a chlorhexidine allergy or have previously reacted to chlorhexidine.  · On babies younger than 2 months of age.      On the day of surgery, when you are taken to your room in Outpatient Surgery you will be given a CHG prepackaged cloth to wipe the site for your surgery.  How to use CHG prepackaged cloths  · Follow the instructions on the label.  · Use the CHG cloth on clean, dry skin. Follow these steps when using a CHG cloth (unless your health care provider gives you different instructions):  1. Using the CHG cloth, vigorously scrub the part of your body where you will be having surgery. Scrub using a back-and-forth motion for 3 minutes. The area on your body should be completely wet with CHG when you are finished scrubbing.  2. Do not rinse. Discard the cloth and let the area air-dry for 1 minute. Do not put any substances on your body afterward, such as powder, lotion, or perfume.  Contact a health care provider if:  · Your skin gets irritated after scrubbing.  · You have questions about using your solution or cloth.  Get help right away if:  · Your eyes become very red or swollen.  · Your eyes itch badly.  · Your skin itches badly and is red or swollen.  · Your hearing changes.  · You have trouble seeing.  · You have swelling or tingling in your mouth or throat.  · You have trouble breathing.  · You swallow any chlorhexidine.  Summary  · Chlorhexidine gluconate (CHG) is a germ-killing (antiseptic) solution that is used to clean the  skin. Cleaning your skin with CHG before surgery helps lower the risk for infection after surgery.  · You may be given CHG to use at home. It may be in a bottle or in a prepackaged cloth to use on your skin. Carefully follow your health care provider's instructions and the instructions on the product label.  · Do not use CHG if you have a chlorhexidine allergy.  · Contact your health care provider if your skin gets irritated after scrubbing.  This information is not intended to replace advice given to you by your health care provider. Make sure you discuss any questions you have with your health care provider.  Document Released: 09/11/2013 Document Revised: 11/15/2018 Document Reviewed: 11/15/2018  [employer] Interactive Patient Education © 2019 [employer]          PATIENT/FAMILY/RESPONSIBLE PARTY VERBALIZES UNDERSTANDING OF ABOVE EDUCATION.  COPY OF PAIN SCALE GIVEN AND REVIEWED WITH VERBALIZED UNDERSTANDING.

## 2021-06-08 NOTE — PROGRESS NOTES
06/08/2021      JULIUS Mirza MD  [address] [place] [address]    Alin Richard  1975    Chief Complaint   Patient presents with   • Pre-op Exam     ALIF with Dr Mirza on Wednesday, June 16th, 2021. Patient denies any stroke like symptoms.    • Former Smoker     Patient is a Former Smoker - Patient is a Current Smokeless Tobacco Useer   • Med Management     Verbally verified medications with patient        Dear JULIUS Mirza MD:      HPI  I had the pleasure of seeing your patient Alin Richard in the office today.  Thank you kindly for this consultation.  As you recall, Alin Richard is a 46 y.o.  male who you are currently following for chronic back pain.  Alin Richardis scheduled for anterior lumbar interbody fusion of L5-S1 with Dr. Mirza on 6/16/21.  The patient denies any history of DVT to his lower extremities.  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  He has Factor V Leiden diagnosed 4 years ago.  He is chronically anticoagulated with Xarelto.     Past Medical History:   Diagnosis Date   • Arthritis    • Depression    • Disease of thyroid gland    • Factor V Leiden (CMS/HCC)    • Hypertension    • Infarction of kidney (CMS/HCC)     BLOOD CLOT IN KIDNEY   • Injury of back    • Murmur, heart    • PTSD (post-traumatic stress disorder)        Past Surgical History:   Procedure Laterality Date   • BACK SURGERY     • LUMBAR DISCECTOMY Bilateral 12/1/2017    Procedure: LUMBAR Micro DISCECTOMY LEFT L4-5 and RIGHT L5-S1;  Surgeon: Favio Castillo MD;  Location: [facility];  Service:    • TONSILLECTOMY     • VASECTOMY         Family History   Problem Relation Age of Onset   • Arthritis Mother    • Hypertension Mother    • Clotting disorder Mother    • Aneurysm Father    • No Known Problems Sister    • No Known Problems Brother        Social History     Socioeconomic History   • Marital status:      Spouse name: Not on file   • Number of  "children: Not on file   • Years of education: Not on file   • Highest education level: Not on file   Tobacco Use   • Smoking status: Former Smoker     Packs/day: 1.00     Years: 5.00     Pack years: 5.00     Types: Cigarettes   • Smokeless tobacco: Current User     Types: Snuff   Substance and Sexual Activity   • Alcohol use: Yes     Comment: occassional/ WEEKENDS   • Drug use: No   • Sexual activity: Yes     Partners: Female       Allergies   Allergen Reactions   • Eggs Or Egg-Derived Products Nausea And Vomiting       Review of Systems   Constitutional: Negative.    HENT: Negative.    Eyes: Negative.    Respiratory: Negative.    Cardiovascular: Negative.    Gastrointestinal: Negative.    Endocrine: Negative.    Genitourinary: Negative.    Musculoskeletal: Positive for back pain.   Skin: Negative.    Allergic/Immunologic: Negative.    Neurological: Negative.    Hematological: Negative.    Psychiatric/Behavioral: Negative.    All other systems reviewed and are negative.      /90 (BP Location: Right arm, Patient Position: Sitting, Cuff Size: Adult)   Pulse 88   Ht 188 cm (74\")   Wt (!) 152 kg (336 lb)   SpO2 95%   BMI 43.14 kg/m²   Physical Exam  Vitals and nursing note reviewed.   Constitutional:       Appearance: Normal appearance. He is well-developed. He is morbidly obese.   HENT:      Head: Normocephalic and atraumatic.   Eyes:      General: No scleral icterus.     Pupils: Pupils are equal, round, and reactive to light.   Neck:      Thyroid: No thyromegaly.      Vascular: No carotid bruit or JVD.   Cardiovascular:      Rate and Rhythm: Normal rate and regular rhythm.      Pulses:           Carotid pulses are 2+ on the right side and 2+ on the left side.       Femoral pulses are 2+ on the right side and 2+ on the left side.       Popliteal pulses are 2+ on the right side and 2+ on the left side.        Dorsalis pedis pulses are 2+ on the right side and 2+ on the left side.        Posterior tibial " pulses are 2+ on the right side and 2+ on the left side.      Heart sounds: Normal heart sounds.   Pulmonary:      Effort: Pulmonary effort is normal.      Breath sounds: Normal breath sounds.   Abdominal:      General: Bowel sounds are normal. There is no distension or abdominal bruit.      Palpations: Abdomen is soft. There is no mass.      Tenderness: There is no abdominal tenderness.   Musculoskeletal:         General: Normal range of motion.      Cervical back: Neck supple.   Lymphadenopathy:      Cervical: No cervical adenopathy.   Skin:     General: Skin is warm and dry.   Neurological:      General: No focal deficit present.      Mental Status: He is alert and oriented to person, place, and time.      Cranial Nerves: No cranial nerve deficit.      Sensory: No sensory deficit.   Psychiatric:         Mood and Affect: Mood normal.         Behavior: Behavior normal. Behavior is cooperative.         Thought Content: Thought content normal.         Judgment: Judgment normal.         No results found.    Patient Active Problem List   Diagnosis   • Displacement of lumbar intervertebral disc without myelopathy   • BMI 38.0-38.9,adult   • Non-smoker   • Renal infarction (CMS/HCC)   • Hypertension   • Herniated intervertebral disc of lumbar spine   • Disease of thyroid gland   • PFO (patent foramen ovale)   • Degeneration of lumbar or lumbosacral intervertebral disc   • Lumbar radiculopathy   • Factor V Leiden (CMS/HCC)   • Migraine         ICD-10-CM ICD-9-CM   1. Chronic midline low back pain, unspecified whether sciatica present  M54.5 724.2    G89.29 338.29   2. Factor V Leiden (CMS/HCC)  D68.51 289.81       Plan: After thoroughly evaluating Alin Richard, I believe the best course of action is to proceed with anterior lumbar interbody fusion of L5-S1.  Risks of ALIF were discussed and include, but are not limited to, bleeding, infection, nerve damage, vessel damage, retrograde ejaculation, bowel damage, ureteral  damage, DVT, MI, stroke, and death.  The patient understands these risks and wishes to proceed with procedure.  The patient can continue taking their current medication regimen as previously planned.  This was all discussed in full with complete understanding.    Thank you for allowing me to participate in the care of your patient.  Please do not hesitate with any questions or concerns.  I will keep you aware of any further encounters with Alin Richard.        Sincerely yours,         Kristopher Ames, DO

## 2021-06-09 ENCOUNTER — TRANSCRIBE ORDERS (OUTPATIENT)
Dept: ADMINISTRATIVE | Facility: HOSPITAL | Age: 46
End: 2021-06-09

## 2021-06-09 DIAGNOSIS — Z11.59 SCREENING FOR VIRAL DISEASE: Primary | ICD-10-CM

## 2021-06-09 LAB
QT INTERVAL: 386 MS
QTC INTERVAL: 425 MS

## 2021-06-14 ENCOUNTER — LAB (OUTPATIENT)
Dept: LAB | Facility: HOSPITAL | Age: 46
End: 2021-06-14

## 2021-06-14 LAB — SARS-COV-2 ORF1AB RESP QL NAA+PROBE: NOT DETECTED

## 2021-06-14 PROCEDURE — C9803 HOPD COVID-19 SPEC COLLECT: HCPCS | Performed by: ORTHOPAEDIC SURGERY

## 2021-06-14 PROCEDURE — U0004 COV-19 TEST NON-CDC HGH THRU: HCPCS | Performed by: ORTHOPAEDIC SURGERY

## 2021-06-15 ENCOUNTER — ANESTHESIA EVENT (OUTPATIENT)
Dept: PERIOP | Facility: HOSPITAL | Age: 46
End: 2021-06-15

## 2021-06-16 ENCOUNTER — APPOINTMENT (OUTPATIENT)
Dept: GENERAL RADIOLOGY | Facility: HOSPITAL | Age: 46
End: 2021-06-16

## 2021-06-16 ENCOUNTER — ANESTHESIA (OUTPATIENT)
Dept: PERIOP | Facility: HOSPITAL | Age: 46
End: 2021-06-16

## 2021-06-16 ENCOUNTER — HOSPITAL ENCOUNTER (INPATIENT)
Facility: HOSPITAL | Age: 46
LOS: 3 days | Discharge: HOME | End: 2021-06-19
Attending: ORTHOPAEDIC SURGERY | Admitting: ORTHOPAEDIC SURGERY

## 2021-06-16 DIAGNOSIS — Z78.9 DECREASED ACTIVITIES OF DAILY LIVING (ADL): ICD-10-CM

## 2021-06-16 DIAGNOSIS — M48.061 SPINAL STENOSIS OF LUMBAR REGION WITHOUT NEUROGENIC CLAUDICATION: Primary | ICD-10-CM

## 2021-06-16 DIAGNOSIS — Z74.09 IMPAIRED MOBILITY: ICD-10-CM

## 2021-06-16 LAB
ABO GROUP BLD: NORMAL
BLD GP AB SCN SERPL QL: NEGATIVE
RH BLD: POSITIVE
T&S EXPIRATION DATE: NORMAL

## 2021-06-16 PROCEDURE — 25010000002 SUCCINYLCHOLINE PER 20 MG: Performed by: NURSE ANESTHETIST, CERTIFIED REGISTERED

## 2021-06-16 PROCEDURE — C1889 IMPLANT/INSERT DEVICE, NOC: HCPCS | Performed by: ORTHOPAEDIC SURGERY

## 2021-06-16 PROCEDURE — 25010000002 MIDAZOLAM PER 1 MG: Performed by: ANESTHESIOLOGY

## 2021-06-16 PROCEDURE — 86901 BLOOD TYPING SEROLOGIC RH(D): CPT | Performed by: ORTHOPAEDIC SURGERY

## 2021-06-16 PROCEDURE — 25010000002 DEXAMETHASONE PER 1 MG: Performed by: NURSE ANESTHETIST, CERTIFIED REGISTERED

## 2021-06-16 PROCEDURE — 25010000002 CEFAZOLIN 1-4 GM/50ML-% SOLUTION: Performed by: ORTHOPAEDIC SURGERY

## 2021-06-16 PROCEDURE — C1713 ANCHOR/SCREW BN/BN,TIS/BN: HCPCS | Performed by: ORTHOPAEDIC SURGERY

## 2021-06-16 PROCEDURE — 97161 PT EVAL LOW COMPLEX 20 MIN: CPT | Performed by: PHYSICAL THERAPIST

## 2021-06-16 PROCEDURE — 25010000002 FENTANYL CITRATE (PF) 50 MCG/ML SOLUTION: Performed by: ANESTHESIOLOGY

## 2021-06-16 PROCEDURE — 25010000002 PROPOFOL 10 MG/ML EMULSION: Performed by: NURSE ANESTHETIST, CERTIFIED REGISTERED

## 2021-06-16 PROCEDURE — 25010000002 ONDANSETRON PER 1 MG: Performed by: NURSE ANESTHETIST, CERTIFIED REGISTERED

## 2021-06-16 PROCEDURE — 74018 RADEX ABDOMEN 1 VIEW: CPT

## 2021-06-16 PROCEDURE — 94799 UNLISTED PULMONARY SVC/PX: CPT

## 2021-06-16 PROCEDURE — 86900 BLOOD TYPING SEROLOGIC ABO: CPT | Performed by: ORTHOPAEDIC SURGERY

## 2021-06-16 PROCEDURE — 25010000003 HYDROMORPHONE 1 MG/ML SOLUTION: Performed by: ORTHOPAEDIC SURGERY

## 2021-06-16 PROCEDURE — 0SG30A0 FUSION OF LUMBOSACRAL JOINT WITH INTERBODY FUSION DEVICE, ANTERIOR APPROACH, ANTERIOR COLUMN, OPEN APPROACH: ICD-10-PCS | Performed by: ORTHOPAEDIC SURGERY

## 2021-06-16 PROCEDURE — 22558 ARTHRD ANT NTRBD MIN DSC LUM: CPT | Performed by: SURGERY

## 2021-06-16 PROCEDURE — 76000 FLUOROSCOPY <1 HR PHYS/QHP: CPT

## 2021-06-16 PROCEDURE — 72100 X-RAY EXAM L-S SPINE 2/3 VWS: CPT

## 2021-06-16 PROCEDURE — 86850 RBC ANTIBODY SCREEN: CPT | Performed by: ORTHOPAEDIC SURGERY

## 2021-06-16 PROCEDURE — 97605 NEG PRS WND THER DME<=50SQCM: CPT | Performed by: SURGERY

## 2021-06-16 PROCEDURE — 0ST40ZZ RESECTION OF LUMBOSACRAL DISC, OPEN APPROACH: ICD-10-PCS | Performed by: ORTHOPAEDIC SURGERY

## 2021-06-16 PROCEDURE — 25010000002 HYDROMORPHONE PER 4 MG: Performed by: ANESTHESIOLOGY

## 2021-06-16 PROCEDURE — 25010000002 HYDROMORPHONE PER 4 MG: Performed by: NURSE ANESTHETIST, CERTIFIED REGISTERED

## 2021-06-16 PROCEDURE — 97165 OT EVAL LOW COMPLEX 30 MIN: CPT

## 2021-06-16 DEVICE — HEMOST ABS SURGIFOAM SZ100 8X12 10MM: Type: IMPLANTABLE DEVICE | Site: SPINE LUMBAR | Status: FUNCTIONAL

## 2021-06-16 DEVICE — LIGACLIP MCA MULTIPLE CLIP APPLIERS, 30 MEDIUM CLIPS
Type: IMPLANTABLE DEVICE | Site: ABDOMEN | Status: FUNCTIONAL
Brand: LIGACLIP

## 2021-06-16 DEVICE — BONE FILLER VOID NANOSS BIOACTIVE 3D 20CC: Type: IMPLANTABLE DEVICE | Site: SPINE LUMBAR | Status: FUNCTIONAL

## 2021-06-16 DEVICE — LIGACLIP MCA MULTIPLE CLIP APPLIERS, 20 SMALL CLIPS
Type: IMPLANTABLE DEVICE | Site: ABDOMEN | Status: FUNCTIONAL
Brand: LIGACLIP

## 2021-06-16 DEVICE — IMPLANTABLE DEVICE: Type: IMPLANTABLE DEVICE | Status: FUNCTIONAL

## 2021-06-16 DEVICE — SCRW STANDALONE M3 ALIF VARI S/TAP 5.5X20MM: Type: IMPLANTABLE DEVICE | Site: SPINE LUMBAR | Status: FUNCTIONAL

## 2021-06-16 DEVICE — ALLOGRFT FLD BIOBURST 1ML: Type: IMPLANTABLE DEVICE | Site: SPINE LUMBAR | Status: FUNCTIONAL

## 2021-06-16 DEVICE — ALIF SCREW, 6.0MM X 30MM
Type: IMPLANTABLE DEVICE | Site: SPINE LUMBAR | Status: FUNCTIONAL
Brand: ASPIDA

## 2021-06-16 DEVICE — SCRW STANDALONE M3 ALIF VARI S/TAP 5.5X25MM: Type: IMPLANTABLE DEVICE | Site: SPINE LUMBAR | Status: FUNCTIONAL

## 2021-06-16 DEVICE — LK ASMBL STANDALONE M3 ALIF: Type: IMPLANTABLE DEVICE | Site: SPINE LUMBAR | Status: FUNCTIONAL

## 2021-06-16 DEVICE — 18MM SACRAL PLATE
Type: IMPLANTABLE DEVICE | Site: SPINE LUMBAR | Status: FUNCTIONAL
Brand: ASPIDA

## 2021-06-16 DEVICE — KT HEMOST ABS SURGIFOAM PORCN 1GRAM: Type: IMPLANTABLE DEVICE | Site: SPINE LUMBAR | Status: FUNCTIONAL

## 2021-06-16 RX ORDER — MELATONIN
1000 DAILY
COMMUNITY
End: 2022-11-03

## 2021-06-16 RX ORDER — MAGNESIUM HYDROXIDE 1200 MG/15ML
LIQUID ORAL AS NEEDED
Status: DISCONTINUED | OUTPATIENT
Start: 2021-06-16 | End: 2021-06-16 | Stop reason: HOSPADM

## 2021-06-16 RX ORDER — SODIUM CHLORIDE 0.9 % (FLUSH) 0.9 %
10 SYRINGE (ML) INJECTION EVERY 12 HOURS SCHEDULED
Status: DISCONTINUED | OUTPATIENT
Start: 2021-06-16 | End: 2021-06-16 | Stop reason: HOSPADM

## 2021-06-16 RX ORDER — SODIUM CHLORIDE 0.9 % (FLUSH) 0.9 %
10 SYRINGE (ML) INJECTION AS NEEDED
Status: DISCONTINUED | OUTPATIENT
Start: 2021-06-16 | End: 2021-06-16 | Stop reason: HOSPADM

## 2021-06-16 RX ORDER — SODIUM CHLORIDE 9 MG/ML
75 INJECTION, SOLUTION INTRAVENOUS CONTINUOUS
Status: DISPENSED | OUTPATIENT
Start: 2021-06-16 | End: 2021-06-17

## 2021-06-16 RX ORDER — HYDROMORPHONE HCL 110MG/55ML
PATIENT CONTROLLED ANALGESIA SYRINGE INTRAVENOUS AS NEEDED
Status: DISCONTINUED | OUTPATIENT
Start: 2021-06-16 | End: 2021-06-16 | Stop reason: SURG

## 2021-06-16 RX ORDER — HYDROMORPHONE HYDROCHLORIDE 1 MG/ML
0.5 INJECTION, SOLUTION INTRAMUSCULAR; INTRAVENOUS; SUBCUTANEOUS
Status: DISCONTINUED | OUTPATIENT
Start: 2021-06-16 | End: 2021-06-16 | Stop reason: HOSPADM

## 2021-06-16 RX ORDER — NALOXONE HCL 0.4 MG/ML
0.04 VIAL (ML) INJECTION AS NEEDED
Status: DISCONTINUED | OUTPATIENT
Start: 2021-06-16 | End: 2021-06-16 | Stop reason: HOSPADM

## 2021-06-16 RX ORDER — DEXTROSE MONOHYDRATE 25 G/50ML
12.5 INJECTION, SOLUTION INTRAVENOUS AS NEEDED
Status: DISCONTINUED | OUTPATIENT
Start: 2021-06-16 | End: 2021-06-16 | Stop reason: HOSPADM

## 2021-06-16 RX ORDER — SODIUM CHLORIDE, SODIUM LACTATE, POTASSIUM CHLORIDE, CALCIUM CHLORIDE 600; 310; 30; 20 MG/100ML; MG/100ML; MG/100ML; MG/100ML
100 INJECTION, SOLUTION INTRAVENOUS CONTINUOUS PRN
Status: DISCONTINUED | OUTPATIENT
Start: 2021-06-16 | End: 2021-06-16

## 2021-06-16 RX ORDER — SUCCINYLCHOLINE CHLORIDE 20 MG/ML
INJECTION INTRAMUSCULAR; INTRAVENOUS AS NEEDED
Status: DISCONTINUED | OUTPATIENT
Start: 2021-06-16 | End: 2021-06-16 | Stop reason: SURG

## 2021-06-16 RX ORDER — SODIUM CHLORIDE 9 MG/ML
75 INJECTION, SOLUTION INTRAVENOUS CONTINUOUS
Status: DISCONTINUED | OUTPATIENT
Start: 2021-06-16 | End: 2021-06-19 | Stop reason: HOSPADM

## 2021-06-16 RX ORDER — TOPIRAMATE 25 MG/1
50 TABLET ORAL DAILY
Status: DISCONTINUED | OUTPATIENT
Start: 2021-06-16 | End: 2021-06-19 | Stop reason: HOSPADM

## 2021-06-16 RX ORDER — ROCURONIUM BROMIDE 10 MG/ML
INJECTION, SOLUTION INTRAVENOUS AS NEEDED
Status: DISCONTINUED | OUTPATIENT
Start: 2021-06-16 | End: 2021-06-16 | Stop reason: SURG

## 2021-06-16 RX ORDER — OXYCODONE AND ACETAMINOPHEN 10; 325 MG/1; MG/1
1 TABLET ORAL EVERY 4 HOURS PRN
Status: DISCONTINUED | OUTPATIENT
Start: 2021-06-16 | End: 2021-06-19 | Stop reason: HOSPADM

## 2021-06-16 RX ORDER — MELATONIN
1000 NIGHTLY
Status: DISCONTINUED | OUTPATIENT
Start: 2021-06-16 | End: 2021-06-19 | Stop reason: HOSPADM

## 2021-06-16 RX ORDER — FENTANYL CITRATE 50 UG/ML
25 INJECTION, SOLUTION INTRAMUSCULAR; INTRAVENOUS
Status: DISCONTINUED | OUTPATIENT
Start: 2021-06-16 | End: 2021-06-16 | Stop reason: HOSPADM

## 2021-06-16 RX ORDER — MIDAZOLAM HYDROCHLORIDE 1 MG/ML
1 INJECTION INTRAMUSCULAR; INTRAVENOUS
Status: COMPLETED | OUTPATIENT
Start: 2021-06-16 | End: 2021-06-16

## 2021-06-16 RX ORDER — ONDANSETRON 4 MG/1
4 TABLET, FILM COATED ORAL EVERY 6 HOURS PRN
Status: DISCONTINUED | OUTPATIENT
Start: 2021-06-16 | End: 2021-06-19 | Stop reason: HOSPADM

## 2021-06-16 RX ORDER — LIDOCAINE HYDROCHLORIDE 10 MG/ML
0.5 INJECTION, SOLUTION EPIDURAL; INFILTRATION; INTRACAUDAL; PERINEURAL ONCE AS NEEDED
Status: DISCONTINUED | OUTPATIENT
Start: 2021-06-16 | End: 2021-06-16 | Stop reason: HOSPADM

## 2021-06-16 RX ORDER — LIDOCAINE HYDROCHLORIDE 20 MG/ML
INJECTION, SOLUTION EPIDURAL; INFILTRATION; INTRACAUDAL; PERINEURAL AS NEEDED
Status: DISCONTINUED | OUTPATIENT
Start: 2021-06-16 | End: 2021-06-16 | Stop reason: SURG

## 2021-06-16 RX ORDER — ONDANSETRON 2 MG/ML
INJECTION INTRAMUSCULAR; INTRAVENOUS AS NEEDED
Status: DISCONTINUED | OUTPATIENT
Start: 2021-06-16 | End: 2021-06-16 | Stop reason: SURG

## 2021-06-16 RX ORDER — OXYCODONE HCL 20 MG/1
20 TABLET, FILM COATED, EXTENDED RELEASE ORAL EVERY 12 HOURS SCHEDULED
Status: DISCONTINUED | OUTPATIENT
Start: 2021-06-16 | End: 2021-06-19 | Stop reason: HOSPADM

## 2021-06-16 RX ORDER — OXYCODONE HCL 20 MG/1
20 TABLET, FILM COATED, EXTENDED RELEASE ORAL ONCE
Status: COMPLETED | OUTPATIENT
Start: 2021-06-16 | End: 2021-06-16

## 2021-06-16 RX ORDER — DEXAMETHASONE SODIUM PHOSPHATE 4 MG/ML
INJECTION, SOLUTION INTRA-ARTICULAR; INTRALESIONAL; INTRAMUSCULAR; INTRAVENOUS; SOFT TISSUE AS NEEDED
Status: DISCONTINUED | OUTPATIENT
Start: 2021-06-16 | End: 2021-06-16 | Stop reason: SURG

## 2021-06-16 RX ORDER — ONDANSETRON 2 MG/ML
4 INJECTION INTRAMUSCULAR; INTRAVENOUS AS NEEDED
Status: DISCONTINUED | OUTPATIENT
Start: 2021-06-16 | End: 2021-06-16 | Stop reason: HOSPADM

## 2021-06-16 RX ORDER — IBUPROFEN 600 MG/1
600 TABLET ORAL ONCE AS NEEDED
Status: DISCONTINUED | OUTPATIENT
Start: 2021-06-16 | End: 2021-06-16 | Stop reason: HOSPADM

## 2021-06-16 RX ORDER — FLUMAZENIL 0.1 MG/ML
0.2 INJECTION INTRAVENOUS AS NEEDED
Status: DISCONTINUED | OUTPATIENT
Start: 2021-06-16 | End: 2021-06-16 | Stop reason: HOSPADM

## 2021-06-16 RX ORDER — ACETAMINOPHEN 500 MG
1000 TABLET ORAL ONCE
Status: COMPLETED | OUTPATIENT
Start: 2021-06-16 | End: 2021-06-16

## 2021-06-16 RX ORDER — ONDANSETRON 2 MG/ML
4 INJECTION INTRAMUSCULAR; INTRAVENOUS EVERY 6 HOURS PRN
Status: DISCONTINUED | OUTPATIENT
Start: 2021-06-16 | End: 2021-06-19 | Stop reason: HOSPADM

## 2021-06-16 RX ORDER — LISINOPRIL 10 MG/1
10 TABLET ORAL DAILY
Status: DISCONTINUED | OUTPATIENT
Start: 2021-06-16 | End: 2021-06-19 | Stop reason: HOSPADM

## 2021-06-16 RX ORDER — FAMOTIDINE 10 MG/ML
20 INJECTION, SOLUTION INTRAVENOUS EVERY 12 HOURS SCHEDULED
Status: DISCONTINUED | OUTPATIENT
Start: 2021-06-16 | End: 2021-06-19 | Stop reason: HOSPADM

## 2021-06-16 RX ORDER — SODIUM CHLORIDE 0.9 % (FLUSH) 0.9 %
3 SYRINGE (ML) INJECTION EVERY 12 HOURS SCHEDULED
Status: DISCONTINUED | OUTPATIENT
Start: 2021-06-16 | End: 2021-06-19 | Stop reason: HOSPADM

## 2021-06-16 RX ORDER — SODIUM CHLORIDE 0.9 % (FLUSH) 0.9 %
10 SYRINGE (ML) INJECTION AS NEEDED
Status: DISCONTINUED | OUTPATIENT
Start: 2021-06-16 | End: 2021-06-19 | Stop reason: HOSPADM

## 2021-06-16 RX ORDER — SODIUM CHLORIDE, SODIUM LACTATE, POTASSIUM CHLORIDE, CALCIUM CHLORIDE 600; 310; 30; 20 MG/100ML; MG/100ML; MG/100ML; MG/100ML
1000 INJECTION, SOLUTION INTRAVENOUS CONTINUOUS
Status: DISCONTINUED | OUTPATIENT
Start: 2021-06-16 | End: 2021-06-16

## 2021-06-16 RX ORDER — CYCLOBENZAPRINE HCL 10 MG
10 TABLET ORAL 3 TIMES DAILY PRN
Status: DISCONTINUED | OUTPATIENT
Start: 2021-06-16 | End: 2021-06-19 | Stop reason: HOSPADM

## 2021-06-16 RX ORDER — CEFAZOLIN SODIUM 1 G/50ML
1 INJECTION, SOLUTION INTRAVENOUS EVERY 8 HOURS
Status: COMPLETED | OUTPATIENT
Start: 2021-06-16 | End: 2021-06-17

## 2021-06-16 RX ORDER — PAROXETINE HYDROCHLORIDE 20 MG/1
20 TABLET, FILM COATED ORAL EVERY MORNING
Status: DISCONTINUED | OUTPATIENT
Start: 2021-06-16 | End: 2021-06-19 | Stop reason: HOSPADM

## 2021-06-16 RX ORDER — OXYCODONE AND ACETAMINOPHEN 10; 325 MG/1; MG/1
1 TABLET ORAL ONCE AS NEEDED
Status: COMPLETED | OUTPATIENT
Start: 2021-06-16 | End: 2021-06-16

## 2021-06-16 RX ORDER — DIPHENHYDRAMINE HCL 25 MG
25 CAPSULE ORAL NIGHTLY PRN
Status: DISCONTINUED | OUTPATIENT
Start: 2021-06-16 | End: 2021-06-19 | Stop reason: HOSPADM

## 2021-06-16 RX ORDER — LEVOTHYROXINE SODIUM 0.05 MG/1
50 TABLET ORAL
Status: DISCONTINUED | OUTPATIENT
Start: 2021-06-16 | End: 2021-06-19 | Stop reason: HOSPADM

## 2021-06-16 RX ORDER — LABETALOL HYDROCHLORIDE 5 MG/ML
5 INJECTION, SOLUTION INTRAVENOUS
Status: DISCONTINUED | OUTPATIENT
Start: 2021-06-16 | End: 2021-06-16 | Stop reason: HOSPADM

## 2021-06-16 RX ORDER — LIDOCAINE HYDROCHLORIDE 40 MG/ML
SOLUTION TOPICAL AS NEEDED
Status: DISCONTINUED | OUTPATIENT
Start: 2021-06-16 | End: 2021-06-16 | Stop reason: SURG

## 2021-06-16 RX ORDER — PROPOFOL 10 MG/ML
VIAL (ML) INTRAVENOUS AS NEEDED
Status: DISCONTINUED | OUTPATIENT
Start: 2021-06-16 | End: 2021-06-16 | Stop reason: SURG

## 2021-06-16 RX ORDER — VECURONIUM BROMIDE 1 MG/ML
INJECTION, POWDER, LYOPHILIZED, FOR SOLUTION INTRAVENOUS AS NEEDED
Status: DISCONTINUED | OUTPATIENT
Start: 2021-06-16 | End: 2021-06-16 | Stop reason: SURG

## 2021-06-16 RX ORDER — CEFAZOLIN SODIUM IN 0.9 % NACL 3 G/100 ML
3 INTRAVENOUS SOLUTION, PIGGYBACK (ML) INTRAVENOUS ONCE
Status: COMPLETED | OUTPATIENT
Start: 2021-06-16 | End: 2021-06-16

## 2021-06-16 RX ORDER — NEOSTIGMINE METHYLSULFATE 5 MG/5 ML
SYRINGE (ML) INTRAVENOUS AS NEEDED
Status: DISCONTINUED | OUTPATIENT
Start: 2021-06-16 | End: 2021-06-16 | Stop reason: SURG

## 2021-06-16 RX ORDER — FAMOTIDINE 20 MG/1
20 TABLET, FILM COATED ORAL EVERY 12 HOURS SCHEDULED
Status: DISCONTINUED | OUTPATIENT
Start: 2021-06-16 | End: 2021-06-19 | Stop reason: HOSPADM

## 2021-06-16 RX ORDER — SODIUM CHLORIDE, SODIUM LACTATE, POTASSIUM CHLORIDE, CALCIUM CHLORIDE 600; 310; 30; 20 MG/100ML; MG/100ML; MG/100ML; MG/100ML
9 INJECTION, SOLUTION INTRAVENOUS CONTINUOUS
Status: DISCONTINUED | OUTPATIENT
Start: 2021-06-16 | End: 2021-06-16

## 2021-06-16 RX ORDER — SUFENTANIL CITRATE 50 UG/ML
INJECTION EPIDURAL; INTRAVENOUS AS NEEDED
Status: DISCONTINUED | OUTPATIENT
Start: 2021-06-16 | End: 2021-06-16 | Stop reason: SURG

## 2021-06-16 RX ORDER — ONDANSETRON 2 MG/ML
4 INJECTION INTRAMUSCULAR; INTRAVENOUS EVERY 6 HOURS PRN
Status: DISCONTINUED | OUTPATIENT
Start: 2021-06-16 | End: 2021-06-16

## 2021-06-16 RX ADMIN — CEFAZOLIN 3 G: 1 INJECTION, POWDER, FOR SOLUTION INTRAMUSCULAR; INTRAVENOUS; PARENTERAL at 07:12

## 2021-06-16 RX ADMIN — VECURONIUM BROMIDE 2 MG: 1 INJECTION, POWDER, LYOPHILIZED, FOR SOLUTION INTRAVENOUS at 08:40

## 2021-06-16 RX ADMIN — LISINOPRIL 10 MG: 10 TABLET ORAL at 15:41

## 2021-06-16 RX ADMIN — HYDROMORPHONE HYDROCHLORIDE 1 MG: 1 INJECTION, SOLUTION INTRAMUSCULAR; INTRAVENOUS; SUBCUTANEOUS at 12:05

## 2021-06-16 RX ADMIN — ONDANSETRON 4 MG: 2 INJECTION INTRAMUSCULAR; INTRAVENOUS at 08:43

## 2021-06-16 RX ADMIN — CYCLOBENZAPRINE 10 MG: 10 TABLET, FILM COATED ORAL at 21:19

## 2021-06-16 RX ADMIN — HYDROMORPHONE HYDROCHLORIDE 0.5 MG: 2 INJECTION, SOLUTION INTRAMUSCULAR; INTRAVENOUS; SUBCUTANEOUS at 07:55

## 2021-06-16 RX ADMIN — SODIUM CHLORIDE, POTASSIUM CHLORIDE, SODIUM LACTATE AND CALCIUM CHLORIDE 1000 ML: 600; 310; 30; 20 INJECTION, SOLUTION INTRAVENOUS at 06:27

## 2021-06-16 RX ADMIN — CEFAZOLIN SODIUM 1 G: 1 INJECTION, SOLUTION INTRAVENOUS at 15:40

## 2021-06-16 RX ADMIN — ROCURONIUM BROMIDE 5 MG: 10 INJECTION INTRAVENOUS at 07:08

## 2021-06-16 RX ADMIN — PAROXETINE 20 MG: 20 TABLET, FILM COATED ORAL at 15:41

## 2021-06-16 RX ADMIN — GLYCOPYRROLATE 0.4 MG: 0.2 INJECTION, SOLUTION INTRAMUSCULAR; INTRAVENOUS at 09:15

## 2021-06-16 RX ADMIN — FAMOTIDINE 20 MG: 10 INJECTION INTRAVENOUS at 12:06

## 2021-06-16 RX ADMIN — LEVOTHYROXINE SODIUM 50 MCG: 50 TABLET ORAL at 15:41

## 2021-06-16 RX ADMIN — Medication 10 MCG: at 07:31

## 2021-06-16 RX ADMIN — SUFENTANIL CITRATE 20 MCG: 50 INJECTION EPIDURAL; INTRAVENOUS at 07:16

## 2021-06-16 RX ADMIN — HYDROMORPHONE HYDROCHLORIDE 0.5 MG: 2 INJECTION, SOLUTION INTRAMUSCULAR; INTRAVENOUS; SUBCUTANEOUS at 09:14

## 2021-06-16 RX ADMIN — OXYCODONE AND ACETAMINOPHEN 1 TABLET: 325; 10 TABLET ORAL at 10:39

## 2021-06-16 RX ADMIN — SODIUM CHLORIDE 75 ML/HR: 9 INJECTION, SOLUTION INTRAVENOUS at 11:04

## 2021-06-16 RX ADMIN — TOPIRAMATE 50 MG: 25 TABLET, FILM COATED ORAL at 15:41

## 2021-06-16 RX ADMIN — MIDAZOLAM 1 MG: 1 INJECTION INTRAMUSCULAR; INTRAVENOUS at 06:48

## 2021-06-16 RX ADMIN — HYDROMORPHONE HYDROCHLORIDE 0.5 MG: 2 INJECTION, SOLUTION INTRAMUSCULAR; INTRAVENOUS; SUBCUTANEOUS at 07:49

## 2021-06-16 RX ADMIN — OXYCODONE HYDROCHLORIDE 20 MG: 20 TABLET, FILM COATED, EXTENDED RELEASE ORAL at 06:00

## 2021-06-16 RX ADMIN — LIDOCAINE HYDROCHLORIDE 1 EACH: 40 SOLUTION TOPICAL at 07:08

## 2021-06-16 RX ADMIN — FENTANYL CITRATE 25 MCG: 50 INJECTION, SOLUTION INTRAMUSCULAR; INTRAVENOUS at 10:20

## 2021-06-16 RX ADMIN — SODIUM CHLORIDE, POTASSIUM CHLORIDE, SODIUM LACTATE AND CALCIUM CHLORIDE: 600; 310; 30; 20 INJECTION, SOLUTION INTRAVENOUS at 07:54

## 2021-06-16 RX ADMIN — Medication 10 MCG: at 07:34

## 2021-06-16 RX ADMIN — Medication 20 MCG: at 08:02

## 2021-06-16 RX ADMIN — SODIUM CHLORIDE, POTASSIUM CHLORIDE, SODIUM LACTATE AND CALCIUM CHLORIDE 100 ML/HR: 600; 310; 30; 20 INJECTION, SOLUTION INTRAVENOUS at 06:27

## 2021-06-16 RX ADMIN — CYCLOBENZAPRINE 10 MG: 10 TABLET, FILM COATED ORAL at 11:37

## 2021-06-16 RX ADMIN — Medication 1000 UNITS: at 21:20

## 2021-06-16 RX ADMIN — HYDROMORPHONE HYDROCHLORIDE 0.5 MG: 1 INJECTION, SOLUTION INTRAMUSCULAR; INTRAVENOUS; SUBCUTANEOUS at 10:05

## 2021-06-16 RX ADMIN — SUCCINYLCHOLINE CHLORIDE 160 MG: 20 INJECTION, SOLUTION INTRAMUSCULAR; INTRAVENOUS at 07:08

## 2021-06-16 RX ADMIN — MIDAZOLAM 1 MG: 1 INJECTION INTRAMUSCULAR; INTRAVENOUS at 06:59

## 2021-06-16 RX ADMIN — SODIUM CHLORIDE, PRESERVATIVE FREE 3 ML: 5 INJECTION INTRAVENOUS at 21:20

## 2021-06-16 RX ADMIN — FENTANYL CITRATE 25 MCG: 50 INJECTION, SOLUTION INTRAMUSCULAR; INTRAVENOUS at 10:25

## 2021-06-16 RX ADMIN — OXYCODONE AND ACETAMINOPHEN 1 TABLET: 325; 10 TABLET ORAL at 21:20

## 2021-06-16 RX ADMIN — LIDOCAINE HYDROCHLORIDE 100 MG: 20 INJECTION, SOLUTION EPIDURAL; INFILTRATION; INTRACAUDAL; PERINEURAL at 07:08

## 2021-06-16 RX ADMIN — HYDROMORPHONE HYDROCHLORIDE 1 MG: 1 INJECTION, SOLUTION INTRAMUSCULAR; INTRAVENOUS; SUBCUTANEOUS at 15:40

## 2021-06-16 RX ADMIN — Medication 3 MG: at 09:15

## 2021-06-16 RX ADMIN — Medication 20 MCG: at 07:38

## 2021-06-16 RX ADMIN — SUFENTANIL CITRATE 10 MCG: 50 INJECTION EPIDURAL; INTRAVENOUS at 07:29

## 2021-06-16 RX ADMIN — ROCURONIUM BROMIDE 45 MG: 10 INJECTION INTRAVENOUS at 07:13

## 2021-06-16 RX ADMIN — HYDROMORPHONE HYDROCHLORIDE 0.5 MG: 2 INJECTION, SOLUTION INTRAMUSCULAR; INTRAVENOUS; SUBCUTANEOUS at 09:17

## 2021-06-16 RX ADMIN — SODIUM CHLORIDE, POTASSIUM CHLORIDE, SODIUM LACTATE AND CALCIUM CHLORIDE: 600; 310; 30; 20 INJECTION, SOLUTION INTRAVENOUS at 09:18

## 2021-06-16 RX ADMIN — HYDROMORPHONE HYDROCHLORIDE 0.5 MG: 1 INJECTION, SOLUTION INTRAMUSCULAR; INTRAVENOUS; SUBCUTANEOUS at 09:55

## 2021-06-16 RX ADMIN — CEFAZOLIN SODIUM 1 G: 1 INJECTION, SOLUTION INTRAVENOUS at 22:41

## 2021-06-16 RX ADMIN — OXYCODONE HYDROCHLORIDE 20 MG: 20 TABLET, FILM COATED, EXTENDED RELEASE ORAL at 18:20

## 2021-06-16 RX ADMIN — SODIUM CHLORIDE 75 ML/HR: 9 INJECTION, SOLUTION INTRAVENOUS at 22:41

## 2021-06-16 RX ADMIN — ACETAMINOPHEN 1000 MG: 500 TABLET, FILM COATED ORAL at 06:48

## 2021-06-16 RX ADMIN — HYDROMORPHONE HYDROCHLORIDE 1 MG: 1 INJECTION, SOLUTION INTRAMUSCULAR; INTRAVENOUS; SUBCUTANEOUS at 19:34

## 2021-06-16 RX ADMIN — DEXAMETHASONE SODIUM PHOSPHATE 4 MG: 4 INJECTION, SOLUTION INTRA-ARTICULAR; INTRALESIONAL; INTRAMUSCULAR; INTRAVENOUS; SOFT TISSUE at 07:19

## 2021-06-16 RX ADMIN — PROPOFOL 200 MG: 10 INJECTION, EMULSION INTRAVENOUS at 07:08

## 2021-06-16 RX ADMIN — ROCURONIUM BROMIDE 20 MG: 10 INJECTION INTRAVENOUS at 07:35

## 2021-06-16 RX ADMIN — FAMOTIDINE 20 MG: 20 TABLET, FILM COATED ORAL at 21:19

## 2021-06-16 RX ADMIN — SUFENTANIL CITRATE 20 MCG: 50 INJECTION EPIDURAL; INTRAVENOUS at 07:04

## 2021-06-16 RX ADMIN — Medication 20 MCG: at 08:08

## 2021-06-16 RX ADMIN — HYDROMORPHONE HYDROCHLORIDE 1 MG: 1 INJECTION, SOLUTION INTRAMUSCULAR; INTRAVENOUS; SUBCUTANEOUS at 22:41

## 2021-06-16 NOTE — OP NOTE
Alin Richard  6/16/2021     PREOPERATIVE DIAGNOSIS:   1.  Factor V Leiden deficiency.   2.  Status post hemilaminotomy decompression right L4 to S1, Dr. Favio Castillo, 12/01/2017.   3.  Increasing chronic back pain.    4.  Residual bilateral buttock, thigh and leg radiculopathy, right worse than left.   5.  Chronic right gastrocsoleus complex weakness.    6.  Degenerative disc disease L4 to S1, severe L5-S1.    7.  Retrolisthesis L5-S1.    8.  Facet arthropathy L4 to S1, worse at L5-S1.   9.  Chronic recurrent disc herniation right L5-S1.   10.  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   11.  Obesity, BMI of 43.      POSTOPERATIVE DIAGNOSIS: same     PROCEDURE PERFORMED:   1.  Anterior lumbar interbody fusion of L5-S1 with instrumentation  2.  Placement of a 13 cm Prevena wound VAC     SURGEON: Kristopher Ames DO   COSURGEON: Kristopher Mirza MD     ANESTHESIA: General.    PREPARATION: Routine.    STAFF: Circulator: Ml Morley RN  Radiology Technologist: Viridiana Sanders  Scrub Person: Elayne Pandya; Ciro Marin Brittany  Vendor Representative: Huber Jesus  Assistant: Parmjit Stahl PA-C    ESTIMATED BLOOD LOSS: 50 mL    SPECIMENS: None    COMPLICATIONS: None    INDICATIONS: Alin Richard is a 46 y.o. male who you are currently following for chronic back pain.  Alin Richardis scheduled for anterior lumbar interbody fusion of L5-S1 with Dr. Mirza on 6/16/21.  The patient denies any history of DVT to his lower extremities.  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  He has Factor V Leiden diagnosed 4 years ago.  He is chronically anticoagulated with Xarelto. The indications, risks, and possible complications of the procedure were explained to the patient, who voiced understanding and wished to proceed with surgery.     PROCEDURE IN DETAIL:   The patient was taken to the operating room and placed on the operating table in a supine  position. After general anesthesia was obtained, the abdomen was prepped and draped in a sterile manner.  A transverse incision was then made in the left lower quadrant.  Careful dissection was made down through the subcutaneous tissues using the Bovie cautery to ensure hemostasis.  Any crossing veins were ligated with 3-0 silk suture and hemoclips.  The rectus fascia was identified.  It was incised with the Bovie cautery.  Kocher clamps are placed on each side of the rectus fascia and subfascial planes were established in a cephalad and caudad direction.  Once the subfascial planes were established the attention was then turned to the left rectus muscle.  Blunt mobilization was made of the left rectus muscle including its blood supply medially and to the right.  Once it was fully mobilized the attention was then turned laterally to the peritoneal reflection.  Entrance into the retroperitoneal space was established with the use of a sponge stick and the Bovie cautery.  Continued blunt mobilization was made with my hand using a finger sweeping motion moving the peritoneal contents and sacral fat pad medially and to the right.  Once it was fully mobilized the Brau-Richard retractor system was set up.  The retractor blades were set in place.  The left iliac vein was then carefully dissected free and placed safely behind the retractor blade.  The sacral vessels were carefully taken down with hemoclips.  At this point full exposure was established of the L5-S1 disc space.  The next part of the case will be dictated by Dr. Mirza. Upon completion of Dr. Mirza's part of the case the wound bed was irrigated with antibiotic saline and hemostasis was observed.  The retractor blades were carefully taken out one at a time.  The structures were then placed back in their normal anatomic positions.  The rectus fascia was then closed with a #1 PDS in a running fashion to meet in the midline.  The deep layers were closed with a  2-0 Vicryl in a running fashion.  The subcutaneous layers were closed with a 3-0 Vicryl in a running fashion.  The skin was then reapproximated using a 4-0 Monocryl in a subcuticular fashion.  The wound was then cleaned.  A 13 cm Prevena wound VAC was placed. The patient tolerated the procedure well. Sponge and needle counts were correct. The patient was then awakened and extubated in the operating room and taken to the recovery room in good condition.    Kristopher Ames,     Date: 6/16/2021 Time: 09:43 CDT

## 2021-06-16 NOTE — ANESTHESIA PREPROCEDURE EVALUATION
Anesthesia Evaluation     Patient summary reviewed   no history of anesthetic complications:  NPO Solid Status: > 8 hours             Airway   Mallampati: II  TM distance: >3 FB  Neck ROM: full  Dental      Pulmonary    (-) COPD, asthma, sleep apnea, not a smoker  Cardiovascular   Exercise tolerance: excellent (>7 METS)    ECG reviewed    (+) hypertension, DVT,   (-) pacemaker, past MI, angina, cardiac stents      Neuro/Psych  (-) seizures, TIA, CVA  GI/Hepatic/Renal/Endo    (+) morbid obesity,  thyroid problem hypothyroidism  (-) GERD, liver disease, no renal disease, diabetes    Musculoskeletal     Abdominal    Substance History      OB/GYN          Other          Other Comment: Factor 5 Leiden                    Anesthesia Plan    ASA 3     general     intravenous induction     Anesthetic plan, all risks, benefits, and alternatives have been provided, discussed and informed consent has been obtained with: patient.  Use of blood products discussed with patient  Consented to blood products.

## 2021-06-16 NOTE — THERAPY EVALUATION
Patient Name: Alin Richard  : 1975    MRN: [id number]                              Today's Date: 2021       Admit Date: 2021    Visit Dx:     ICD-10-CM ICD-9-CM   1. Impaired mobility  Z74.09 799.89     Patient Active Problem List   Diagnosis   • Displacement of lumbar intervertebral disc without myelopathy   • BMI 38.0-38.9,adult   • Non-smoker   • Renal infarction (CMS/HCC)   • Hypertension   • Herniated intervertebral disc of lumbar spine   • Disease of thyroid gland   • PFO (patent foramen ovale)   • Degeneration of lumbar or lumbosacral intervertebral disc   • Lumbar radiculopathy   • Factor V Leiden (CMS/HCC)   • Migraine   • Lumbar stenosis     Past Medical History:   Diagnosis Date   • Arthritis    • Depression    • Disease of thyroid gland    • Factor V Leiden (CMS/HCC)    • Hypertension    • Infarction of kidney (CMS/HCC)     BLOOD CLOT IN KIDNEY   • Injury of back    • Migraine    • Murmur, heart    • PTSD (post-traumatic stress disorder)      Past Surgical History:   Procedure Laterality Date   • BACK SURGERY     • LUMBAR DISCECTOMY Bilateral 2017    Procedure: LUMBAR Micro DISCECTOMY LEFT L4-5 and RIGHT L5-S1;  Surgeon: Favio Castillo MD;  Location: [facility];  Service:    • TONSILLECTOMY     • VASECTOMY       General Information     Row Name 21 6938          Physical Therapy Time and Intention    Document Type  evaluation s/p ALIF L5-S1 due to increased back pain, B buttock, thigh, leg radicular R>L, R gastroc weak  -MS     Mode of Treatment  physical therapy;co-treatment  -MS     Row Name 21 2816          General Information    Patient Profile Reviewed  yes  -MS     Prior Level of Function  independent:;all household mobility;community mobility;ADL's;driving  -MS     Existing Precautions/Restrictions  brace worn when out of bed;fall;spinal  -MS     Barriers to Rehab  physical barrier  -MS     Row Name 21 4478          Living Environment    Lives With   spouse  -MS     Row Name 06/16/21 1336          Home Main Entrance    Number of Stairs, Main Entrance  none walk in shower without seat, ramp  -MS     Row Name 06/16/21 1336          Stairs Within Home, Primary    Number of Stairs, Within Home, Primary  none  -MS     Row Name 06/16/21 1336          Cognition    Orientation Status (Cognition)  oriented x 4  -MS     Row Name 06/16/21 1336          Safety Issues, Functional Mobility    Impairments Affecting Function (Mobility)  balance;endurance/activity tolerance;strength;pain  -MS       User Key  (r) = Recorded By, (t) = Taken By, (c) = Cosigned By    Initials Name Provider Type    MS Mojgan Person, PT, DPT, NCS Physical Therapist        Mobility     Row Name 06/16/21 1336          Bed Mobility    Bed Mobility  supine-sit;sit-supine  -MS     Supine-Sit [place] (Bed Mobility)  contact guard;nonverbal cues (demo/gesture);verbal cues  -MS     Sit-Supine [place] (Bed Mobility)  contact guard;verbal cues;nonverbal cues (demo/gesture)  -MS     Row Name 06/16/21 1336          Sit-Stand Transfer    Sit-Stand [place] (Transfers)  minimum assist (75% patient effort);verbal cues;nonverbal cues (demo/gesture)  -MS     Row Name 06/16/21 1336          Gait/Stairs (Locomotion)    [place] Level (Gait)  minimum assist (75% patient effort);verbal cues;nonverbal cues (demo/gesture)  -MS     Assistive Device (Gait)  -- HHA  -MS     Distance in Feet (Gait)  20ft x2 with difficulty lifting L foot due to abdominal incision pain  -MS       User Key  (r) = Recorded By, (t) = Taken By, (c) = Cosigned By    Initials Name Provider Type    MS Mojgan Person, PT, DPT, NCS Physical Therapist        Obj/Interventions     Row Name 06/16/21 1336          Range of Motion Comprehensive    Comment, General Range of Motion  painful L hip flexion, impaired 25%  -MS     Row Name 06/16/21 1336          Strength Comprehensive (MMT)    Comment, General Manual Muscle Testing (MMT)  Assessment  L hip flexion too painful for testing  -MS     Row Name 06/16/21 1336          Balance    Balance Assessment  sitting static balance;sitting dynamic balance;standing static balance;standing dynamic balance  -MS     Static Sitting Balance  WFL  -MS     Dynamic Sitting Balance  WFL  -MS     Static Standing Balance  WFL;supported  -MS     Dynamic Standing Balance  WFL;supported  -MS     Row Name 06/16/21 1336          Sensory Assessment (Somatosensory)    Sensory Assessment (Somatosensory)  sensation intact  -MS       User Key  (r) = Recorded By, (t) = Taken By, (c) = Cosigned By    Initials Name Provider Type    MS Person Mojgan HERMELINDA, PT, DPT, NCS Physical Therapist        Goals/Plan     Row Name 06/16/21 1336          Bed Mobility Goal 1 (PT)    Activity/Assistive Device (Bed Mobility Goal 1, PT)  bed mobility activities, all  -MS     [place] Level/Cues Needed (Bed Mobility Goal 1, PT)  independent  -MS     Time Frame (Bed Mobility Goal 1, PT)  long term goal (LTG);by discharge  -MS     Progress/Outcomes (Bed Mobility Goal 1, PT)  goal ongoing  -MS     Row Name 06/16/21 1336          Transfer Goal 1 (PT)    Activity/Assistive Device (Transfer Goal 1, PT)  sit-to-stand/stand-to-sit;bed-to-chair/chair-to-bed  -MS     [place] Level/Cues Needed (Transfer Goal 1, PT)  independent  -MS     Time Frame (Transfer Goal 1, PT)  long term goal (LTG);by discharge  -MS     Progress/Outcome (Transfer Goal 1, PT)  goal ongoing  -MS     Row Name 06/16/21 1336          Gait Training Goal 1 (PT)    Activity/Assistive Device (Gait Training Goal 1, PT)  gait (walking locomotion);decrease fall risk;increase endurance/gait distance;improve balance and speed  -MS     [place] Level (Gait Training Goal 1, PT)  independent  -MS     Distance (Gait Training Goal 1, PT)  150ft  -MS     Time Frame (Gait Training Goal 1, PT)  long term goal (LTG);by discharge  -MS     Progress/Outcome (Gait Training Goal 1, PT)  goal  ongoing  -MS       User Key  (r) = Recorded By, (t) = Taken By, (c) = Cosigned By    Initials Name Provider Type    MS Mojgan Person R, PT, DPT, NCS Physical Therapist        Clinical Impression     Row Name 06/16/21 3256          Pain    Additional Documentation  Pain Scale: Numbers Pre/Post-Treatment (Group)  -MS     Row Name 06/16/21 1336          Pain Scale: Numbers Pre/Post-Treatment    Pretreatment Pain Rating  4/10  -MS     Posttreatment Pain Rating  4/10  -MS     Pain Location - Orientation  lower  -MS     Pain Location  back  -MS     Pre/Posttreatment Pain Comment  on L side radiating toward L hip  -MS     Pain Intervention(s)  Medication (See MAR);Repositioned;Ambulation/increased activity  -MS     Row Name 06/16/21 1446          Plan of Care Review    Plan of Care Reviewed With  patient  -MS     Progress  no change  -MS     Outcome Summary  PT evaluation completed. The patient presents alert and oriented x4 with LSO in the room. He reports signficant incisional pain with any L leg movement or change of position. He had difficulty with ambulation due to incisional pain when attempting to lift his L LE. He has no focal neurological deficits in strength, sensation, or coordination. He will benefit from skilled PT services to work on gait mechanics and decreased pain with mobility. The patient is planning to have a posterior lumbar surgery on Friday. PT will continue to follow.  -MS     Row Name 06/16/21 7772          Therapy Assessment/Plan (PT)    Patient/Family Therapy Goals Statement (PT)  decrease pain  -MS     Rehab Potential (PT)  good, to achieve stated therapy goals  -MS     Criteria for Skilled Interventions Met (PT)  yes;meets criteria;skilled treatment is necessary  -MS     Predicted Duration of Therapy Intervention (PT)  until discharge  -MS     Row Name 06/16/21 8156          Positioning and Restraints    Post Treatment Position  bed  -MS     In Bed  fowlers;call light within reach;encouraged  to call for assist;side rails up x2;patient within staff view  -MS       User Key  (r) = Recorded By, (t) = Taken By, (c) = Cosigned By    Initials Name Provider Type    MS Person Mojgan SHEN, PT, DPT, NCS Physical Therapist        Outcome Measures     Row Name 06/16/21 1336          How much help from another person do you currently need...    Turning from your back to your side while in flat bed without using bedrails?  3  -MS     Moving from lying on back to sitting on the side of a flat bed without bedrails?  3  -MS     Moving to and from a bed to a chair (including a wheelchair)?  3  -MS     Standing up from a chair using your arms (e.g., wheelchair, bedside chair)?  3  -MS     Climbing 3-5 steps with a railing?  1  -MS     To walk in hospital room?  3  -MS     AM-PAC 6 Clicks Score (PT)  16  -MS     Row Name 06/16/21 1336          Functional Assessment    Outcome Measure Options  AM-PAC 6 Clicks Basic Mobility (PT)  -MS       User Key  (r) = Recorded By, (t) = Taken By, (c) = Cosigned By    Initials Name Provider Type    MS Raza Mojgan SHEN, PT, DPT, NCS Physical Therapist        Physical Therapy Education                 Title: PT OT SLP Therapies (In Progress)     Topic: Physical Therapy (In Progress)     Point: Mobility training (Done)     Learning Progress Summary           Patient Acceptance, E, VU by MS at 6/16/2021 1432    Comment: role of PT in his care, spinal restrictions                   Point: Home exercise program (Not Started)     Learner Progress:  Not documented in this visit.          Point: Body mechanics (Not Started)     Learner Progress:  Not documented in this visit.          Point: Precautions (Done)     Learning Progress Summary           Patient Acceptance, E, VU by MS at 6/16/2021 1432    Comment: role of PT in his care, spinal restrictions                               User Key     Initials Effective Dates Name Provider Type Discipline    MS 06/19/18 -  Mojgan Person, PT, DPT, NCS  Physical Therapist PT              PT Recommendation and Plan  Planned Therapy Interventions (PT): balance training, bed mobility training, gait training, patient/family education, orthotic fitting/training, strengthening, transfer training  Plan of Care Reviewed With: patient  Progress: no change  Outcome Summary: PT evaluation completed. The patient presents alert and oriented x4 with LSO in the room. He reports signficant incisional pain with any L leg movement or change of position. He had difficulty with ambulation due to incisional pain when attempting to lift his L LE. He has no focal neurological deficits in strength, sensation, or coordination. He will benefit from skilled PT services to work on gait mechanics and decreased pain with mobility. The patient is planning to have a posterior lumbar surgery on Friday. PT will continue to follow.     Time Calculation:   PT Charges     Row Name 06/16/21 1336             Time Calculation    Start Time  1336  -MS      Stop Time  1420  -MS      Time Calculation (min)  44 min  -MS      PT Received On  06/16/21  -MS      PT Goal Re-Cert Due Date  06/26/21  -MS         Untimed Charges    PT Eval/Re-eval Minutes  44  -MS         Total Minutes    Untimed Charges Total Minutes  44  -MS       Total Minutes  44  -MS        User Key  (r) = Recorded By, (t) = Taken By, (c) = Cosigned By    Initials Name Provider Type    MS Mojgan Person, PT, DPT, NCS Physical Therapist        Therapy Charges for Today     Code Description Service Date Service Provider Modifiers Qty    21694611893 HC PT EVAL LOW COMPLEXITY 3 6/16/2021 Mojgan Person PT, DPT, NCS GP 1          PT G-Codes  Outcome Measure Options: AM-PAC 6 Clicks Basic Mobility (PT)  AM-PAC 6 Clicks Score (PT): 16    Mojgan Person PT, DPT, NCS  6/16/2021

## 2021-06-16 NOTE — PLAN OF CARE
Goal Outcome Evaluation:  Plan of Care Reviewed With: patient, spouse        Progress: no change  Outcome Summary: OT eval completed. Pt awake and alert in fowlers, nc in place, c/o 4/10 pain in low back. educated on spinal precautions and activity modifcations with verbalized and demo understanding. MaxA for LB dressing, Ruthie to don brace. CGA and vc for bed mobility and Ruthie for fxl mobility with room. Pt limiting independence requiring HHA. OT indicated to address [place] transfer training to increase independence and compliance with spinal precautions. Anticipate d/c home w assist following second sx.

## 2021-06-16 NOTE — ANESTHESIA PROCEDURE NOTES
Airway  Urgency: elective    Date/Time: 6/16/2021 7:09 AM  Airway not difficult    General Information and Staff    Patient location during procedure: OR  CRNA: Jacob Baca CRNA    Indications and Patient Condition  Indications for airway management: airway protection    Preoxygenated: yes  Mask difficulty assessment: 0 - not attempted    Final Airway Details  Final airway type: endotracheal airway      Successful airway: ETT  Cuffed: yes   Successful intubation technique: video laryngoscopy  Facilitating devices/methods: intubating stylet  Endotracheal tube insertion site: oral  Blade: Glynn  Blade size: 4  ETT size (mm): 7.5  Cormack-Lehane Classification: grade I - full view of glottis  Placement verified by: chest auscultation and capnometry   Cuff volume (mL): 10  Measured from: lips  ETT/EBT  to lips (cm): 23  Number of attempts at approach: 1  Assessment: lips, teeth, and gum same as pre-op and atraumatic intubation

## 2021-06-16 NOTE — THERAPY EVALUATION
Patient Name: Alin Richard  : 1975    MRN: [id number]                              Today's Date: 2021       Admit Date: 2021    Visit Dx:     ICD-10-CM ICD-9-CM   1. Impaired mobility  Z74.09 799.89   2. Decreased activities of daily living (ADL)  Z78.9 V49.89     Patient Active Problem List   Diagnosis   • Displacement of lumbar intervertebral disc without myelopathy   • BMI 38.0-38.9,adult   • Non-smoker   • Renal infarction (CMS/HCC)   • Hypertension   • Herniated intervertebral disc of lumbar spine   • Disease of thyroid gland   • PFO (patent foramen ovale)   • Degeneration of lumbar or lumbosacral intervertebral disc   • Lumbar radiculopathy   • Factor V Leiden (CMS/HCC)   • Migraine   • Lumbar stenosis     Past Medical History:   Diagnosis Date   • Arthritis    • Depression    • Disease of thyroid gland    • Factor V Leiden (CMS/HCC)    • Hypertension    • Infarction of kidney (CMS/HCC)     BLOOD CLOT IN KIDNEY   • Injury of back    • Migraine    • Murmur, heart    • PTSD (post-traumatic stress disorder)      Past Surgical History:   Procedure Laterality Date   • BACK SURGERY     • LUMBAR DISCECTOMY Bilateral 2017    Procedure: LUMBAR Micro DISCECTOMY LEFT L4-5 and RIGHT L5-S1;  Surgeon: Favio Castillo MD;  Location: [facility];  Service:    • TONSILLECTOMY     • VASECTOMY       General Information     Row Name 21 1330          OT Time and Intention    Document Type  evaluation  -MW     Mode of Treatment  occupational therapy  -MW     Row Name 21 5811          General Information    Patient Profile Reviewed  yes  -MW     Prior Level of Function  independent:;all household mobility;community mobility;ADL's;driving  -MW     Existing Precautions/Restrictions  brace worn when out of bed;fall;spinal  -MW     Barriers to Rehab  physical barrier  -MW     Row Name 21 1302          Occupational Profile    Reason for Services/Referral (Occupational Profile)  ALIF L5-S1    -     Environmental Supports and Barriers (Occupational Profile)  walk in shower  -[facility] Name 06/16/21 1330          Living Environment    Lives With  spouse  -[facility] Name 06/16/21 1330          Home Main Entrance    Number of Stairs, Main Entrance  none  -[facility] Name 06/16/21 1330          Stairs Within Home, Primary    Number of Stairs, Within Home, Primary  none  -[facility] Name 06/16/21 1330          Cognition    Orientation Status (Cognition)  oriented x 4  -[facility] Name 06/16/21 1330          Safety Issues, Functional Mobility    Impairments Affecting Function (Mobility)  balance;endurance/activity tolerance;strength;pain  -       User Key  (r) = Recorded By, (t) = Taken By, (c) = Cosigned By    Initials Name Provider Type     Eva Solano, OTR/L Occupational Therapist          Mobility/ADL's     Row Name 06/16/21 1330          Bed Mobility    Bed Mobility  supine-sit;sit-supine  -     Supine-Sit [place] (Bed Mobility)  contact guard;nonverbal cues (demo/gesture);verbal cues  -     Sit-Supine [place] (Bed Mobility)  contact guard;verbal cues;nonverbal cues (demo/gesture)  -     Assistive Device (Bed Mobility)  bed rails  -[facility] Name 06/16/21 1330          Transfers    Transfers  sit-stand transfer  -     Comment (Transfers)  increased time and effort 2' pain  -     Sit-Stand [place] (Transfers)  minimum assist (75% patient effort);verbal cues;nonverbal cues (demo/gesture)  -[facility] Name 06/16/21 1330          Functional Mobility    Functional Mobility- Ind. Level  contact guard assist;minimum assist (75% patient effort);2 person assist required;verbal cues required  -     Functional Mobility- Comment  HHAx2 2' pain  -[facility] Name 06/16/21 1330          Activities of Daily Living    BADL Assessment/Intervention  upper body dressing;lower body dressing  -[facility] Name 06/16/21 1330          Upper Body Dressing Assessment/Training    [place]  Level (Upper Body Dressing)  don;doff;minimum assist (75% patient effort)  -MW     Position (Upper Body Dressing)  edge of bed sitting  -MW     Comment (Upper Body Dressing)  LSO  -MW     Row Name 06/16/21 1330          Lower Body Dressing Assessment/Training    [place] Level (Lower Body Dressing)  don;doff;maximum assist (25% patient effort)  -MW     Position (Lower Body Dressing)  edge of bed sitting  -MW     Comment (Lower Body Dressing)  2' pain and weakness  -MW       User Key  (r) = Recorded By, (t) = Taken By, (c) = Cosigned By    Initials Name Provider Type    Eva Kuhn, OTR/L Occupational Therapist        Obj/Interventions     Row Name 06/16/21 1330          Range of Motion Comprehensive    General Range of Motion  bilateral upper extremity ROM WNL  -MW     Row Name 06/16/21 1330          Strength Comprehensive (MMT)    Comment, General Manual Muscle Testing (MMT) Assessment  BUE 5/5  -MW     Row Name 06/16/21 1330          Balance    Static Sitting Balance  WFL;sitting, edge of bed  -MW     Dynamic Sitting Balance  WFL;sitting, edge of bed  -MW     Static Standing Balance  WFL;supported HHA  -MW     Dynamic Standing Balance  WFL;supported  -MW       User Key  (r) = Recorded By, (t) = Taken By, (c) = Cosigned By    Initials Name Provider Type    Eva Kuhn, OTR/L Occupational Therapist        Goals/Plan     Row Name 06/16/21 1330          Transfer Goal 1 (OT)    Activity/Assistive Device (Transfer Goal 1, OT)  sit-to-stand/stand-to-sit;bed-to-chair/chair-to-bed;toilet;shower chair;walk-in shower  -MW     [place] Level/Cues Needed (Transfer Goal 1, OT)  supervision required  -MW     Time Frame (Transfer Goal 1, OT)  long term goal (LTG);10 days  -MW     Progress/Outcome (Transfer Goal 1, OT)  goal ongoing  -MW     Row Name 06/16/21 1330          Dressing Goal 1 (OT)    Activity/Device (Dressing Goal 1, OT)  upper body dressing;lower body dressing  -MW     [place]/Cues Needed  (Dressing Goal 1, OT)  -- S for LSO, Ruthie for LB dressing  -MW     Time Frame (Dressing Goal 1, OT)  long term goal (LTG);10 days  -MW     Progress/Outcome (Dressing Goal 1, OT)  goal ongoing  -MW     Row Name 06/16/21 1330          Toileting Goal 1 (OT)    Activity/Device (Toileting Goal 1, OT)  toileting skills, all;commode  -MW     [place] Level/Cues Needed (Toileting Goal 1, OT)  supervision required  -MW     Time Frame (Toileting Goal 1, OT)  long term goal (LTG);10 days  -MW     Progress/Outcome (Toileting Goal 1, OT)  goal ongoing  -MW     Row Name 06/16/21 1330          Therapy Assessment/Plan (OT)    Planned Therapy Interventions (OT)  activity tolerance training;BADL retraining;functional balance retraining;occupation/activity based interventions;orthotic fabrication/fitting/training;patient/caregiver education/training;transfer/mobility retraining  -MW       User Key  (r) = Recorded By, (t) = Taken By, (c) = Cosigned By    Initials Name Provider Type    MW Eva Solano, OTR/L Occupational Therapist        Clinical Impression     Row Name 06/16/21 1330          Pain Scale: Numbers Pre/Post-Treatment    Pretreatment Pain Rating  4/10  -MW     Posttreatment Pain Rating  4/10  -MW     Pain Location - Orientation  lower  -MW     Pain Location  back  -MW     Pre/Posttreatment Pain Comment  on L side radiating toward L hip  -MW     Pain Intervention(s)  Medication (See MAR);Repositioned;Ambulation/increased activity  -MW     Row Name 06/16/21 8050          Plan of Care Review    Plan of Care Reviewed With  patient;spouse  -MW     Progress  no change  -MW     Outcome Summary  OT eval completed. Pt awake and alert in fowlers, nc in place, c/o 4/10 pain in low back. educated on spinal precautions and activity modifcations with verbalized and demo understanding. MaxA for LB dressing, Ruthie to don brace. CGA and vc for bed mobility and Ruthie for fxl mobility with room. Pt limiting independence requiring HHA.  OT indicated to address [place] transfer training to increase independence and compliance with spinal precautions. Anticipate d/c home w assist following second sx.  -MW     Row Name 06/16/21 1330          Therapy Assessment/Plan (OT)    Patient/Family Therapy Goal Statement (OT)  decrease pain  -MW     Rehab Potential (OT)  good, to achieve stated therapy goals  -MW     Criteria for Skilled Therapeutic Interventions Met (OT)  yes;skilled treatment is necessary  -MW     Therapy Frequency (OT)  5 times/wk  -MW     Predicted Duration of Therapy Intervention (OT)  10 days  -MW     Row Name 06/16/21 1330          Therapy Plan Review/Discharge Plan (OT)    Anticipated Discharge Disposition (OT)  home with assist  -MW     Row Name 06/16/21 1330          Vital Signs    Pre SpO2 (%)  95  -MW     O2 Delivery Pre Treatment  nasal cannula  -MW     Intra SpO2 (%)  91  -MW     O2 Delivery Intra Treatment  room air  -MW     Post SpO2 (%)  94  -MW     O2 Delivery Post Treatment  nasal cannula  -MW     Pre Patient Position  Supine  -MW     Intra Patient Position  Standing  -MW     Post Patient Position  Supine  -MW     Row Name 06/16/21 1330          Positioning and Restraints    Pre-Treatment Position  in bed  -MW     Post Treatment Position  bed  -MW     In Bed  notified nsg;fowlers;call light within reach;encouraged to call for assist;side rails up x2  -MW       User Key  (r) = Recorded By, (t) = Taken By, (c) = Cosigned By    Initials Name Provider Type    Eva Kuhn, OTR/L Occupational Therapist        Outcome Measures     Row Name 06/16/21 1330          How much help from another is currently needed...    Putting on and taking off regular lower body clothing?  2  -MW     Bathing (including washing, rinsing, and drying)  2  -MW     Toileting (which includes using toilet bed pan or urinal)  3  -MW     Putting on and taking off regular upper body clothing  3  -MW     Taking care of personal grooming (such as brushing  teeth)  4  -MW     Eating meals  4  -MW     AM-PAC 6 Clicks Score (OT)  18  -MW     Row Name 06/16/21 1336          How much help from another person do you currently need...    Turning from your back to your side while in flat bed without using bedrails?  3  -MS     Moving from lying on back to sitting on the side of a flat bed without bedrails?  3  -MS     Moving to and from a bed to a chair (including a wheelchair)?  3  -MS     Standing up from a chair using your arms (e.g., wheelchair, bedside chair)?  3  -MS     Climbing 3-5 steps with a railing?  1  -MS     To walk in hospital room?  3  -MS     AM-PAC 6 Clicks Score (PT)  16  -MS     Row Name 06/16/21 1336 06/16/21 1330       Functional Assessment    Outcome Measure Options  AM-PAC 6 Clicks Basic Mobility (PT)  -MS  AM-PAC 6 Clicks Daily Activity (OT)  -MW      User Key  (r) = Recorded By, (t) = Taken By, (c) = Cosigned By    Initials Name Provider Type    Mojgan Aranda R, PT, DPT, NCS Physical Therapist    Eva Kuhn, OTR/L Occupational Therapist        Occupational Therapy Education                 Title: PT OT SLP Therapies (In Progress)     Topic: Occupational Therapy (Done)     Point: ADL training (Done)     Description:   Instruct learner(s) on proper safety adaptation and remediation techniques during self care or transfers.   Instruct in proper use of assistive devices.              Learning Progress Summary           Patient Acceptance, E,D, VU,NR by  at 6/16/2021 1507                   Point: Home exercise program (Done)     Description:   Instruct learner(s) on appropriate technique for monitoring, assisting and/or progressing therapeutic exercises/activities.              Learning Progress Summary           Patient Acceptance, E,D, VU,NR by  at 6/16/2021 1507                   Point: Precautions (Done)     Description:   Instruct learner(s) on prescribed precautions during self-care and functional transfers.              Learning  Progress Summary           Patient Acceptance, E,D, VU,NR by  at 6/16/2021 1507                   Point: Body mechanics (Done)     Description:   Instruct learner(s) on proper positioning and spine alignment during self-care, functional mobility activities and/or exercises.              Learning Progress Summary           Patient Acceptance, E,D, VU,NR by  at 6/16/2021 1507                               User Key     Initials Effective Dates Name Provider Type Discipline     08/28/18 -  Eva Solano, OTR/L Occupational Therapist OT              OT Recommendation and Plan  Planned Therapy Interventions (OT): activity tolerance training, BADL retraining, functional balance retraining, occupation/activity based interventions, orthotic fabrication/fitting/training, patient/caregiver education/training, transfer/mobility retraining  Therapy Frequency (OT): 5 times/wk  Plan of Care Review  Plan of Care Reviewed With: patient, spouse  Progress: no change  Outcome Summary: OT eval completed. Pt awake and alert in fowlers, nc in place, c/o 4/10 pain in low back. educated on spinal precautions and activity modifcations with verbalized and demo understanding. MaxA for LB dressing, Ruthie to don brace. CGA and vc for bed mobility and Ruthie for fxl mobility with room. Pt limiting independence requiring HHA. OT indicated to address [place] transfer training to increase independence and compliance with spinal precautions. Anticipate d/c home w assist following second sx.     Time Calculation:   Time Calculation- OT     Row Name 06/16/21 1507             Time Calculation- OT    OT Start Time  1330 +5 min chart review  -      OT Stop Time  1405  -      OT Time Calculation (min)  35 min  -MW      OT Received On  06/16/21  -      OT Goal Re-Cert Due Date  06/26/21  -        User Key  (r) = Recorded By, (t) = Taken By, (c) = Cosigned By    Initials Name Provider Type     Eva Solano, OTR/L Occupational Therapist         Therapy Charges for Today     Code Description Service Date Service Provider Modifiers Qty    [id number] HC OT EVAL LOW COMPLEXITY 3 6/16/2021 Eva Solano, OTR/L GO 1               TEQUILA Torres/YEIMI  6/16/2021

## 2021-06-16 NOTE — ANESTHESIA POSTPROCEDURE EVALUATION
Patient: Alin Richard    Procedure Summary     Date: 06/16/21 Room / Location:  [facility] OR  /  [facility] OR    Anesthesia Start: 0702 Anesthesia Stop: 0939    Procedures:       ANTERIOR DECOMPRESSION, ANTERIOR LUMBAR INTERBODY FUSION WITH INSTRUMENTATION L5-S1 (N/A Spine Lumbar)      ANTERIOR LUMBAR EXPOSURE (N/A Abdomen) Diagnosis: (M54.16)    Surgeons: JULIUS Mirza MD; Kristopher Ames DO Provider: Jacob Baca CRNA    Anesthesia Type: general ASA Status: 3          Anesthesia Type: general    Vitals  Vitals Value Taken Time   /81 06/16/21 1045   Temp 98.1 °F (36.7 °C) 06/16/21 0934   Pulse 96 06/16/21 1047   Resp 10 06/16/21 1045   SpO2 94 % 06/16/21 1047   Vitals shown include unvalidated device data.        Post Anesthesia Care and Evaluation    Patient location during evaluation: PACU  Patient participation: complete - patient participated  Level of consciousness: awake and alert  Pain management: adequate  Airway patency: patent  Anesthetic complications: No anesthetic complications  PONV Status: none  Cardiovascular status: acceptable and hemodynamically stable  Respiratory status: acceptable  Hydration status: acceptable    Comments: Blood pressure 137/81, pulse 90, temperature 98 °F (36.7 °C), temperature source Oral, resp. rate 18, SpO2 95 %.    Patient discharged from PACU based upon Charly score. Please see RN notes for further details

## 2021-06-16 NOTE — OP NOTE
LUMBAR ANTERIOR INTERBODY FUSION  Procedure Note    Alin Richard  6/16/2021    Pre-op Diagnosis:    1.  Factor V Leiden deficiency.   2.  Status post hemilaminotomy decompression right L4 to S1, Dr. Favio Castillo, 12/01/2017.   3.  Increasing chronic back pain.    4.  Residual bilateral buttock, thigh and leg radiculopathy, right worse than left.   5.  Chronic right gastrocsoleus complex weakness.    6.  Degenerative disc disease L4 to S1, severe L5-S1.    7.  Retrolisthesis L5-S1.    8.  Facet arthropathy L4 to S1, worse at L5-S1.   9.  Chronic recurrent disc herniation right L5-S1.   10.  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   11.  Obesity, BMI of 43.     Post-op Diagnosis:    same    Procedure/CPT® Codes:     1. Anterior discectomy decompression with bilateral neural foraminotomy L5-S1  2. Anterior lumbar interbody fusion L5-S1  3. Anterior spinal instrumentation L5-S1 (ATEC anterior plate and screws)  4. Use of titanium interbody biomechanical device for fusion L5-S1 (CoreLink titanium spacer)  5. Use of allograft bone matrix for fusion L5-S1  6. Use of allograft liquid for fusion L5-S1 (BioBurst)  7. Use of fluoroscopy for confirmation of surgical level, placement of interbody spacer and instrumentation  8. Intraoperative neural monitoring     Anesthesia: General     Surgeon: RODGER Mirza MD     Co Surgeon: Dr. Kristopher Ames D.O.     Assistant: Parmjit Stahl PA-C     Estimated Blood Loss: 50 mL     Complications: None     Condition: Stable to PACU.     Indications:     The patient is a 46-year-old who sees Dr. Lev Stallworth for medical issues.  He has a medical history significant for factor V Leiden deficiency.  He presented to the office with a history of a prior decompression on the right side from L4-S1 performed by Dr. Favio Castillo on 12/1/2017.  The procedure did help with some of his leg radiculopathy but unfortunately continued to have chronic back pain as well as a  recurrence of symptoms down both lower extremities, with the right side worse than the left.  He also was noted to have chronic weakness in his right gastrocsoleus complex.  Imaging studies revealed degenerative disc disease and facet arthropathy from L4-S1 that was severe at L5-S1, where there was also retrolisthesis and a chronic recurrent disc herniation on the right causing central and foraminal stenosis that was severe on the right side.  While he did have degenerative changes and stenosis at L4-5, it was felt that this was minimally symptomatic.  It is possible he may require surgery at the L4-5 level at some point in the future.    After failing all conservative measures, it was mutually decided that surgery would be the best option.  Risks, benefits, and complications of surgery were discussed in detail. The patient appeared well informed and wished to proceed. We specifically discussed the risk of infection, blood loss, nerve root injury, CSF leak, and the possibility of incomplete resolution of symptoms. We also discussed the possible risk of a nonunion and the potential need for additional surgery in the event of a pseudoarthrosis or hardware failure.    We elected to proceed with a staged operation.  Today we are performing an anterior decompression and fusion of L5-S1, it is planned that we will be returning to surgery for a second posterior procedure at a later date.     Operative Procedure:     After obtaining informed consent and verifying the correct operative level, the patient was brought to the operating room and placed supine on an operating table. A general anesthetic was provided by the anesthesia service with the assistance of an endotracheal tube. Once this was appropriately positioned and secured, the anterior abdominal region was prepped and draped in usual sterile fashion. A surgical timeout was taken to confirm this was the correct patient, we were working at the correct level, and that  preoperative antibiotics were given in a timely fashion.     At this point, Dr. Kristopher Ames D.O. provided vascular access to the L5-S1 level. He performed a left sided anterior retroperitoneal approach to the L5-S1 segment. Please see his separate dictated operative report regarding the details on the approach itself.  When I entered the procedure, self retaining retractors were already in position with excellent exposure of the L5-S1 disc space.     After confirming we were at the correct level using fluoroscopy, I used a long handle 10 blade scalpel to cut into the L5-S1 disc space. A Agustin elevator was used to remove disc material off of the endplates. Disc material was retrieved using pituitaries and Kerrisons. A disc space distractor was then placed into the L5-S1 disc space and I used curettes to remove posterior disc material. Kerrisons were used to remove posterior osteophytes across the endplates of L5 and S1. There was high-grade stenosis centrally but also foraminally. I then performed a bilateral neural foraminotomy with Kerrisons and curettes. The decompression was much more involved than what is usually required for an anterior lumbar interbody fusion by itself and required significantly more time to perform.  This was due to the severe disc space collapse and high-grade foraminal stenosis.     After the decompression was completed bilaterally and centrally, I used a series of endplate scrapers to prepare the endplates for interbody fusion. Trial spacers were then malleted into position and it was felt that a 17 mm titanium spacer with the largest footprint and 50 degrees lordosis from the CoreLink instrumentation set would be the best fit to restore disc height also restoring foraminal height and providing some indirect decompression. The disc space was then thoroughly irrigated with saline solution.  Gelfoam powder with thrombin was used to control epidural bleeding.     A 17 mm titanium spacer  from the CoreLink instrumentation set was then packed as tightly as possible with allograft bone matrix mixed with an allograft liquid called BioBurst. This spacer was then malleted into the L5-S1 disc space under fluoroscopic guidance. It was placed as an interbody biomechanical device to assist with fusion.  I then placed a 25 mm screw through the spacer into L5 and a 20 mm screw through the spacer into S1 to help maintain position and prevent migration of the spacer.     A 4-hole anterior plate from the [facility] instrumentation set was then chosen. The 4 holes were drilled and four 30 mm screws were used to fix the plate across the L5-S1 segment augmenting the fusion.      We then inspected the entire operative field for any signs of bleeding.  Bleeding was once again controlled using thrombin with Gelfoam powder and bipolar cautery.  Once we ensured that adequate hemostasis was accomplished, final fluoroscopy imaging was taken to confirm adequate position of our implants. There was excellent restoration of disc height and the plate and screw construct appeared to be adequately positioned across L5-S1.     Please see Dr. Kristopher Ames's separate dictated operative report regarding the closure of the wound. Once this was accomplished, the patient was extubated and sent to the recovery room in good stable condition. We estimated blood loss to be approximately 50 mL and the patient remained hemodynamically stable during the procedure.     Dr. Kristopher Ames D.O. provided vascular access to the L5-S1 level and acted as a co-surgeon in this fashion.  Parmjit Stahl PA-C provided critical assistance during the decompression at L5-S1 as well as during the placement of the titanium spacer, bone graft and instrumentation to obtain a fusion across L5-S1.    RODGER Mirza MD    Date: 6/16/2021  Time: 08:47 CDT

## 2021-06-16 NOTE — PLAN OF CARE
Goal Outcome Evaluation:  Plan of Care Reviewed With: patient        Progress: no change  Outcome Summary: PT evaluation completed. The patient presents alert and oriented x4 with LSO in the room. He reports signficant incisional pain with any L leg movement or change of position. He had difficulty with ambulation due to incisional pain when attempting to lift his L LE. He has no focal neurological deficits in strength, sensation, or coordination. He will benefit from skilled PT services to work on gait mechanics and decreased pain with mobility. The patient is planning to have a posterior lumbar surgery on Friday. PT will continue to follow.

## 2021-06-17 PROBLEM — F43.10 PTSD (POST-TRAUMATIC STRESS DISORDER): Chronic | Status: ACTIVE | Noted: 2021-06-17

## 2021-06-17 PROBLEM — E03.9 HYPOTHYROIDISM (ACQUIRED): Chronic | Status: ACTIVE | Noted: 2021-06-17

## 2021-06-17 PROBLEM — K59.03 DRUG-INDUCED CONSTIPATION: Status: ACTIVE | Noted: 2021-06-17

## 2021-06-17 LAB
ANION GAP SERPL CALCULATED.3IONS-SCNC: 8 MMOL/L (ref 5–15)
BASOPHILS # BLD AUTO: 0.04 10*3/MM3 (ref 0–0.2)
BASOPHILS NFR BLD AUTO: 0.3 % (ref 0–1.5)
BUN SERPL-MCNC: 9 MG/DL (ref 6–20)
BUN/CREAT SERPL: 9.9 (ref 7–25)
CALCIUM SPEC-SCNC: 8.7 MG/DL (ref 8.6–10.5)
CHLORIDE SERPL-SCNC: 105 MMOL/L (ref 98–107)
CO2 SERPL-SCNC: 26 MMOL/L (ref 22–29)
CREAT SERPL-MCNC: 0.91 MG/DL (ref 0.76–1.27)
DEPRECATED RDW RBC AUTO: 43.1 FL (ref 37–54)
EOSINOPHIL # BLD AUTO: 0.02 10*3/MM3 (ref 0–0.4)
EOSINOPHIL NFR BLD AUTO: 0.2 % (ref 0.3–6.2)
ERYTHROCYTE [DISTWIDTH] IN BLOOD BY AUTOMATED COUNT: 13.2 % (ref 12.3–15.4)
GFR SERPL CREATININE-BSD FRML MDRD: 89 ML/MIN/1.73
GLUCOSE SERPL-MCNC: 121 MG/DL (ref 65–99)
HCT VFR BLD AUTO: 36.7 % (ref 37.5–51)
HGB BLD-MCNC: 11.7 G/DL (ref 13–17.7)
IMM GRANULOCYTES # BLD AUTO: 0.07 10*3/MM3 (ref 0–0.05)
IMM GRANULOCYTES NFR BLD AUTO: 0.5 % (ref 0–0.5)
LYMPHOCYTES # BLD AUTO: 2.46 10*3/MM3 (ref 0.7–3.1)
LYMPHOCYTES NFR BLD AUTO: 18.9 % (ref 19.6–45.3)
MCH RBC QN AUTO: 28.7 PG (ref 26.6–33)
MCHC RBC AUTO-ENTMCNC: 31.9 G/DL (ref 31.5–35.7)
MCV RBC AUTO: 90 FL (ref 79–97)
MONOCYTES # BLD AUTO: 1.28 10*3/MM3 (ref 0.1–0.9)
MONOCYTES NFR BLD AUTO: 9.8 % (ref 5–12)
NEUTROPHILS NFR BLD AUTO: 70.3 % (ref 42.7–76)
NEUTROPHILS NFR BLD AUTO: 9.15 10*3/MM3 (ref 1.7–7)
NRBC BLD AUTO-RTO: 0 /100 WBC (ref 0–0.2)
PLATELET # BLD AUTO: 264 10*3/MM3 (ref 140–450)
PMV BLD AUTO: 9.9 FL (ref 6–12)
POTASSIUM SERPL-SCNC: 3.8 MMOL/L (ref 3.5–5.2)
RBC # BLD AUTO: 4.08 10*6/MM3 (ref 4.14–5.8)
SODIUM SERPL-SCNC: 139 MMOL/L (ref 136–145)
WBC # BLD AUTO: 13.02 10*3/MM3 (ref 3.4–10.8)

## 2021-06-17 PROCEDURE — 85025 COMPLETE CBC W/AUTO DIFF WBC: CPT | Performed by: ORTHOPAEDIC SURGERY

## 2021-06-17 PROCEDURE — 97116 GAIT TRAINING THERAPY: CPT

## 2021-06-17 PROCEDURE — 25010000003 HYDROMORPHONE 1 MG/ML SOLUTION: Performed by: ORTHOPAEDIC SURGERY

## 2021-06-17 PROCEDURE — 97535 SELF CARE MNGMENT TRAINING: CPT | Performed by: OCCUPATIONAL THERAPIST

## 2021-06-17 PROCEDURE — 80048 BASIC METABOLIC PNL TOTAL CA: CPT | Performed by: ORTHOPAEDIC SURGERY

## 2021-06-17 PROCEDURE — 25010000002 CEFAZOLIN 1-4 GM/50ML-% SOLUTION: Performed by: ORTHOPAEDIC SURGERY

## 2021-06-17 RX ORDER — ACETAMINOPHEN 325 MG/1
650 TABLET ORAL EVERY 6 HOURS PRN
Status: DISCONTINUED | OUTPATIENT
Start: 2021-06-17 | End: 2021-06-19 | Stop reason: HOSPADM

## 2021-06-17 RX ORDER — POLYETHYLENE GLYCOL 3350 17 G/17G
17 POWDER, FOR SOLUTION ORAL 2 TIMES DAILY
Status: DISCONTINUED | OUTPATIENT
Start: 2021-06-17 | End: 2021-06-19 | Stop reason: HOSPADM

## 2021-06-17 RX ADMIN — POLYETHYLENE GLYCOL (3350) 17 G: 17 POWDER, FOR SOLUTION ORAL at 09:21

## 2021-06-17 RX ADMIN — Medication 1000 UNITS: at 21:06

## 2021-06-17 RX ADMIN — FAMOTIDINE 20 MG: 20 TABLET, FILM COATED ORAL at 21:06

## 2021-06-17 RX ADMIN — OXYCODONE AND ACETAMINOPHEN 1 TABLET: 325; 10 TABLET ORAL at 14:25

## 2021-06-17 RX ADMIN — ACETAMINOPHEN 650 MG: 325 TABLET, FILM COATED ORAL at 16:34

## 2021-06-17 RX ADMIN — HYDROMORPHONE HYDROCHLORIDE 1 MG: 1 INJECTION, SOLUTION INTRAMUSCULAR; INTRAVENOUS; SUBCUTANEOUS at 05:22

## 2021-06-17 RX ADMIN — SODIUM CHLORIDE, PRESERVATIVE FREE 3 ML: 5 INJECTION INTRAVENOUS at 09:22

## 2021-06-17 RX ADMIN — OXYCODONE HYDROCHLORIDE 20 MG: 20 TABLET, FILM COATED, EXTENDED RELEASE ORAL at 09:21

## 2021-06-17 RX ADMIN — CEFAZOLIN SODIUM 1 G: 1 INJECTION, SOLUTION INTRAVENOUS at 06:03

## 2021-06-17 RX ADMIN — FAMOTIDINE 20 MG: 20 TABLET, FILM COATED ORAL at 09:21

## 2021-06-17 RX ADMIN — PAROXETINE 20 MG: 20 TABLET, FILM COATED ORAL at 09:21

## 2021-06-17 RX ADMIN — HYDROMORPHONE HYDROCHLORIDE 1 MG: 1 INJECTION, SOLUTION INTRAMUSCULAR; INTRAVENOUS; SUBCUTANEOUS at 02:09

## 2021-06-17 RX ADMIN — LEVOTHYROXINE SODIUM 50 MCG: 50 TABLET ORAL at 05:22

## 2021-06-17 RX ADMIN — LISINOPRIL 10 MG: 10 TABLET ORAL at 09:21

## 2021-06-17 RX ADMIN — HYDROMORPHONE HYDROCHLORIDE 1 MG: 1 INJECTION, SOLUTION INTRAMUSCULAR; INTRAVENOUS; SUBCUTANEOUS at 16:34

## 2021-06-17 RX ADMIN — OXYCODONE HYDROCHLORIDE 20 MG: 20 TABLET, FILM COATED, EXTENDED RELEASE ORAL at 20:15

## 2021-06-17 RX ADMIN — TOPIRAMATE 50 MG: 25 TABLET, FILM COATED ORAL at 09:21

## 2021-06-17 RX ADMIN — OXYCODONE AND ACETAMINOPHEN 1 TABLET: 325; 10 TABLET ORAL at 10:44

## 2021-06-17 NOTE — THERAPY TREATMENT NOTE
Patient Name: Alin Richard  : 1975    MRN: [id number]                              Today's Date: 2021       Admit Date: 2021    Visit Dx:     ICD-10-CM ICD-9-CM   1. Impaired mobility  Z74.09 799.89   2. Decreased activities of daily living (ADL)  Z78.9 V49.89     Patient Active Problem List   Diagnosis   • Displacement of lumbar intervertebral disc without myelopathy   • BMI 38.0-38.9,adult   • Non-smoker   • Renal infarction (CMS/HCC)   • Essential hypertension   • Herniated intervertebral disc of lumbar spine   • Disease of thyroid gland   • PFO (patent foramen ovale)   • Degeneration of lumbar or lumbosacral intervertebral disc   • Lumbar radiculopathy   • Factor V Leiden (CMS/HCC)   • Migraine   • Lumbar stenosis   • Hypothyroidism (acquired)   • PTSD (post-traumatic stress disorder)   • Drug-induced constipation     Past Medical History:   Diagnosis Date   • Arthritis    • Depression    • Disease of thyroid gland    • Factor V Leiden (CMS/HCC)    • Hypertension    • Infarction of kidney (CMS/HCC)     BLOOD CLOT IN KIDNEY   • Injury of back    • Migraine    • Murmur, heart    • PTSD (post-traumatic stress disorder)      Past Surgical History:   Procedure Laterality Date   • BACK SURGERY     • LUMBAR DISCECTOMY Bilateral 2017    Procedure: LUMBAR Micro DISCECTOMY LEFT L4-5 and RIGHT L5-S1;  Surgeon: Favio Castillo MD;  Location: [facility];  Service:    • TONSILLECTOMY     • VASECTOMY       General Information     Row Name 21 1133          OT Time and Intention    Document Type  therapy note (daily note)  -JJOHN (r) GURMEET (t) JJ (c)     Mode of Treatment  occupational therapy  -JJ (r) GURMEET (t) JJ (c)     Row Name 21 1133          General Information    Patient Profile Reviewed  yes  -JJ (r) CE (t) JJ (c)     Existing Precautions/Restrictions  brace worn when out of bed;fall;spinal  -JJ (r) CE (t) JJ (c)     Barriers to Rehab  physical barrier  -JJ (r) CE (t) JJ (c)     Row Name  06/17/21 1133          Cognition    Orientation Status (Cognition)  oriented x 4  -JJ (r) CE (t) JJ (c)     Row Name 06/17/21 1133          Safety Issues, Functional Mobility    Impairments Affecting Function (Mobility)  balance;endurance/activity tolerance;strength;pain  -JJ (r) CE (t) JJ (c)       User Key  (r) = Recorded By, (t) = Taken By, (c) = Cosigned By    Initials Name Provider Type    Africa Melendez, OTR/L Occupational Therapist    Lucretia Donis, OT Student OT Student          Mobility/ADL's     Row Name 06/17/21 1133          Bed Mobility    Bed Mobility  sit-sidelying;rolling left;rolling right  -JJ (r) CE (t) JJ (c)     Rolling Left [place] (Bed Mobility)  modified independence  -JJ (r) CE (t) JJ (c)     Rolling Right [place] (Bed Mobility)  modified independence  -JJ (r) CE (t) JJ (c)     Sit-Sidelying [place] (Bed Mobility)  contact guard;verbal cues;nonverbal cues (demo/gesture)  -JJ (r) CE (t) JJ (c)     Assistive Device (Bed Mobility)  bed rails  -JJ (r) CE (t) JJ (c)     Row Name 06/17/21 1133          Transfers    Transfers  sit-stand transfer  -JJ (r) CE (t) JJ (c)     Comment (Transfers)  Pt performed stand to sit only  -JJ (r) CE (t) JJ (c)     Sit-Stand [place] (Transfers)  contact guard  -JJ (r) CE (t) JJ (c)     Row Name 06/17/21 1133          Sit-Stand Transfer    Assistive Device (Sit-Stand Transfers)  walker, front-wheeled;other (see comments) bed elevated  -JJ (r) CE (t) JJ (c)     Row Name 06/17/21 1133          Functional Mobility    Functional Mobility- Ind. Level  contact guard assist;verbal cues required;nonverbal cues required (demo/gesture)  -JJ (r) CE (t) JJ (c)     Functional Mobility- Device  rolling walker  -JJ (r) CE (t) JJ (c)     Functional Mobility- Comment  Pt ambulated back to bed from bathroom  -JJ (r) CE (t) JJ (c)     Row Name 06/17/21 1133          Activities of Daily Living    BADL Assessment/Intervention  toileting;upper body  dressing  -JJ (r) CE (t) JJ (c)     Row Name 06/17/21 1133          Upper Body Dressing Assessment/Training    [place] Level (Upper Body Dressing)  doff;maximum assist (25% patient effort) pt impulsively laid down before removing brace d/t pain, pt rolled L and R to remove brace in bed. Pt able to adjust straps with supervision and VC  -JJ (r) CE (t) JJ (c)     Position (Upper Body Dressing)  supine  -JJ (r) CE (t) JJ (c)     Comment (Upper Body Dressing)  LSO  -JJ (r) CE (t) JJ (c)     Row Name 06/17/21 1133          Toileting Assessment/Training    [place] Level (Toileting)  adjust/manage clothing;contact guard assist  -JJ (r) CE (t) JJ (c)     Assistive Devices (Toileting)  commode  -JJ (r) CE (t) JJ (c)     Position (Toileting)  supported standing  -JJ (r) CE (t) JJ (c)     Comment (Toileting)  pt standing at toilet upon entry  -JJ (r) CE (t) JJ (c)       User Key  (r) = Recorded By, (t) = Taken By, (c) = Cosigned By    Initials Name Provider Type    Africa Melendez OTR/L Occupational Therapist    Lucretia Donis, OT Student OT Student        Obj/Interventions     Row Name 06/17/21 1133          Balance    Balance Assessment  sitting static balance;sitting dynamic balance;standing static balance;standing dynamic balance  -JJ (r) CE (t) JJ (c)     Static Sitting Balance  WFL  -JJ (r) CE (t) JJ (c)     Dynamic Sitting Balance  WFL  -JJ (r) CE (t) JJ (c)     Static Standing Balance  WFL;supported  -JJ (r) CE (t) JJ (c)     Dynamic Standing Balance  WFL;supported  -JJ (r) CE (t) JJ (c)       User Key  (r) = Recorded By, (t) = Taken By, (c) = Cosigned By    Initials Name Provider Type    Africa Melendez OTR/L Occupational Therapist    Lucretia Donis, OT Student OT Student        Goals/Plan    No documentation.       Clinical Impression     Row Name 06/17/21 1133          Pain Assessment    Additional Documentation  Pain Scale: Numbers Pre/Post-Treatment (Group)  -JJOHN (r) CE (t) ELIA (c)      Row Name 06/17/21 1133          Pain Scale: Numbers Pre/Post-Treatment    Pretreatment Pain Rating  10/10  -JJ (r) CE (t) JJ (c)     Posttreatment Pain Rating  7/10  -JJ (r) CE (t) JJ (c)     Pain Location - Side  Left  -JJ (r) CE (t) JJ (c)     Pain Location - Orientation  lower  -JJ (r) CE (t) JJ (c)     Pain Location  abdomen  -JJ (r) CE (t) JJ (c)     Pre/Posttreatment Pain Comment  radiates towards genitals  -JJ (r) CE (t) JJ (c)     Pain Intervention(s)  Medication (See MAR);Repositioned;Ambulation/increased activity  -JJ (r) CE (t) JJ (c)     Row Name 06/17/21 1133          Plan of Care Review    Plan of Care Reviewed With  patient;spouse  -JJ (r) CE (t) JJ (c)     Progress  improving  -JJ (r) CE (t) JJ (c)     Outcome Summary  OT tx completed. Pt alert and oriented x4 and agreeable to therapy. Pt standing at toilet upon arrival, pt completed toileting with CGA and RW. Pt ambulated back to bed with CGA and RW. Pt adjusted straps on LSO with supervision and VC, but impulsively laid down d/t pain and was max A to remove LSO while in bed. Pt performed sit to sidelying with CGA and rolled mod I. Pt c/o pain throughout session which limited participation. Pt would benefit from continued OT to address decreased ADL performance. Recommend d/c home with assist.  -JJ (r) CE (t) JJ (c)     Row Name 06/17/21 1133          Therapy Assessment/Plan (OT)    Patient/Family Therapy Goal Statement (OT)  not stated at this time  -JJ (r) CE (t) JJ (c)     Predicted Duration of Therapy Intervention (OT)  until d/c  -JJ (r) CE (t) JJ (c)     Row Name 06/17/21 1133          Therapy Plan Review/Discharge Plan (OT)    Anticipated Discharge Disposition (OT)  home with assist  -JJ (r) CE (t) JJ (c)     Row Name 06/17/21 1133          Positioning and Restraints    Pre-Treatment Position  bathroom  -JJ (r) CE (t) JJOHN (c)     Post Treatment Position  bed  -JJ (r) GURMEET (t) JJOHN (c)     In Bed  fowlers;call light within reach;encouraged to  call for assist;side rails up x2;with family/caregiver  -JJ (r) CE (t) JJ (c)       User Key  (r) = Recorded By, (t) = Taken By, (c) = Cosigned By    Initials Name Provider Type    Africa Melendez OTR/L Occupational Therapist    Lucretia Donis, OT Student OT Student        Outcome Measures     Row Name 06/17/21 1133          How much help from another is currently needed...    Putting on and taking off regular lower body clothing?  2  -JJ (r) CE (t) JJ (c)     Bathing (including washing, rinsing, and drying)  2  -JJ (r) CE (t) JJ (c)     Toileting (which includes using toilet bed pan or urinal)  3  -JJ (r) CE (t) JJ (c)     Putting on and taking off regular upper body clothing  3  -JJ (r) CE (t) JJ (c)     Taking care of personal grooming (such as brushing teeth)  4  -JJ (r) CE (t) JJ (c)     Eating meals  4  -JJ (r) CE (t) JJ (c)     AM-PAC 6 Clicks Score (OT)  18  -JJ (r) CE (t)     Row Name 06/17/21 1133          Functional Assessment    Outcome Measure Options  AM-PAC 6 Clicks Daily Activity (OT)  -JJ (r) CE (t) JJ (c)       User Key  (r) = Recorded By, (t) = Taken By, (c) = Cosigned By    Initials Name Provider Type    Africa Melendez OTR/L Occupational Therapist    Lucretia Donis, OT Student OT Student        Occupational Therapy Education                 Title: PT OT SLP Therapies (In Progress)     Topic: Occupational Therapy (Done)     Point: ADL training (Done)     Description:   Instruct learner(s) on proper safety adaptation and remediation techniques during self care or transfers.   Instruct in proper use of assistive devices.              Learning Progress Summary           Patient Acceptance, E,TB, VU by CE at 6/17/2021 5523    Comment: Pt educated on spinal precautions, brace management, and log rolling technique. Pt educated on body positioning during LB dressing. Pt educated on LB dressing completion at home and maintaining safety.    Acceptance, E,D, VU,NR by PRABHJOT at 6/16/2021  1507   Significant Other Acceptance, E,TB, VU by CE at 6/17/2021 1354    Comment: Pt educated on spinal precautions, brace management, and log rolling technique. Pt educated on body positioning during LB dressing. Pt educated on LB dressing completion at home and maintaining safety.                   Point: Home exercise program (Done)     Description:   Instruct learner(s) on appropriate technique for monitoring, assisting and/or progressing therapeutic exercises/activities.              Learning Progress Summary           Patient Acceptance, E,TB, VU by CE at 6/17/2021 1354    Comment: Pt educated on spinal precautions, brace management, and log rolling technique. Pt educated on body positioning during LB dressing. Pt educated on LB dressing completion at home and maintaining safety.    Acceptance, E,D, VU,NR by  at 6/16/2021 1507   Significant Other Acceptance, E,TB, VU by CE at 6/17/2021 1354    Comment: Pt educated on spinal precautions, brace management, and log rolling technique. Pt educated on body positioning during LB dressing. Pt educated on LB dressing completion at home and maintaining safety.                   Point: Precautions (Done)     Description:   Instruct learner(s) on prescribed precautions during self-care and functional transfers.              Learning Progress Summary           Patient Acceptance, E,TB, VU by CE at 6/17/2021 1354    Comment: Pt educated on spinal precautions, brace management, and log rolling technique. Pt educated on body positioning during LB dressing. Pt educated on LB dressing completion at home and maintaining safety.    Acceptance, E,D, VU,NR by  at 6/16/2021 1507   Significant Other Acceptance, E,TB, VU by CE at 6/17/2021 1354    Comment: Pt educated on spinal precautions, brace management, and log rolling technique. Pt educated on body positioning during LB dressing. Pt educated on LB dressing completion at home and maintaining safety.                   Point:  Body mechanics (Done)     Description:   Instruct learner(s) on proper positioning and spine alignment during self-care, functional mobility activities and/or exercises.              Learning Progress Summary           Patient Acceptance, E,TB, VU by CE at 6/17/2021 1354    Comment: Pt educated on spinal precautions, brace management, and log rolling technique. Pt educated on body positioning during LB dressing. Pt educated on LB dressing completion at home and maintaining safety.    Acceptance, E,D, VU,NR by  at 6/16/2021 1507   Significant Other Acceptance, E,TB, VU by CE at 6/17/2021 1354    Comment: Pt educated on spinal precautions, brace management, and log rolling technique. Pt educated on body positioning during LB dressing. Pt educated on LB dressing completion at home and maintaining safety.                               User Key     Initials Effective Dates Name Provider Type Discipline     08/28/18 -  Eva Solano, OTR/L Occupational Therapist OT    CE 05/10/21 -  Lucretia Carranza OT Student OT Student OT              OT Recommendation and Plan     Plan of Care Review  Plan of Care Reviewed With: patient, spouse  Progress: improving  Outcome Summary: OT tx completed. Pt alert and oriented x4 and agreeable to therapy. Pt standing at toilet upon arrival, pt completed toileting with CGA and RW. Pt ambulated back to bed with CGA and RW. Pt adjusted straps on LSO with supervision and VC, but impulsively laid down d/t pain and was max A to remove LSO while in bed. Pt performed sit to sidelying with CGA and rolled mod I. Pt c/o pain throughout session which limited participation. Pt would benefit from continued OT to address decreased ADL performance. Recommend d/c home with assist.     Time Calculation:   Time Calculation- OT     Row Name 06/17/21 1133 06/17/21 1006          Time Calculation- OT    OT Start Time  1133  -JJ (r) CE (t) JJ (c)  --     OT Stop Time  1206  -JJ (r) CE (t) JJ (c)  --     OT  Time Calculation (min)  33 min  -ELIA (luz) GURMEET (t)  --     Total Timed Code Minutes- OT  33 minute(s)  -ELIA (r) GURMEET (t) ELIA (c)  --     OT Received On  06/17/21  -ELIA (r) GURMEET (t) ELIA (c)  --        Timed Charges    02050 - Gait Training Minutes   --  41  -AH        Total Minutes    Timed Charges Total Minutes  --  41  -AH      Total Minutes  --  41  -AH       User Key  (r) = Recorded By, (t) = Taken By, (c) = Cosigned By    Initials Name Provider Type    Sonia Arias, NAVIN Physical Therapy Assistant    Africa Melendez, OTR/L Occupational Therapist    Lucretia Donis, OT Student OT Student                 LUCRETIA GILMAN, OT Student  6/17/2021

## 2021-06-17 NOTE — PLAN OF CARE
Goal Outcome Evaluation:  Plan of Care Reviewed With: patient        Progress: no change  Outcome Summary: A&Ox4. C/o pain. Prn pain medication given with good relief. [place] CDI. Wound vac in place. Denies n/t. LSO when out of bed. SCD. . Room air. NPO at midnight, sx tomorrow. Call light within reach. Safety maintained.PPP.

## 2021-06-17 NOTE — PLAN OF CARE
Goal Outcome Evaluation:  Plan of Care Reviewed With: patient        Progress: improving  Outcome Summary: pt trans to EOB cga-min assist, min assist to adis LSO, sit-stand with bed elevated cga-min assist, pt requested use of rwx, pt amb 75 feet with rwx very slow pace cga, trans to chair cga,

## 2021-06-17 NOTE — THERAPY TREATMENT NOTE
Acute Care - Physical Therapy Treatment Note  [facility]     Patient Name: Alin Richard  : 1975  MRN: [id number]  Today's Date: 2021           PT Assessment (last 12 hours)      PT Evaluation and Treatment     [facility] Name 21       Physical Therapy Time and Intention    Subjective Information  complains of;pain;weakness  -  --  -    Document Type  therapy note (daily note)  -  --    Mode of Treatment  physical therapy  -  --    Session Not Performed  --  other (see comments)  -    Comment, Session Not Performed  --  BREAKFAST  -[facility] Name 21          General Information    Existing Precautions/Restrictions  brace worn when out of bed;fall;spinal  -[facility] Name 21          Pain Scale: Numbers Pre/Post-Treatment    Pretreatment Pain Rating  7/10  -     Posttreatment Pain Rating  7/10  -     Pain Location - Orientation  incisional  -     Pain Location  abdomen  -[facility] Name 21          Pain Scale: Word Pre/Post-Treatment    Pain Intervention(s)  Medication (See MAR);Repositioned;Ambulation/increased activity  -[facility] Name 21          Bed Mobility    Bed Mobility  sidelying-sit;sit-sidelying  -     Sidelying-Sit [place] (Bed Mobility)  contact guard;minimum assist (75% patient effort);verbal cues  -     Sit-Sidelying [place] (Bed Mobility)  -- chair  -[facility] Name 21          Transfers    Comment (Transfers)  bed elevated  -     Sit-Stand [place] (Transfers)  minimum assist (75% patient effort);verbal cues  -[facility] Name 21          Gait/Stairs (Locomotion)    [place] Level (Gait)  contact guard;verbal cues  -     Assistive Device (Gait)  walker, front-wheeled  -     Distance in Feet (Gait)  75  -     Deviations/Abnormal Patterns (Gait)  coby decreased  -AH     Row Name 21          Orthotics & Prosthetics Management    Orthosis Location   spinal orthosis  -     Additional Documentation  Orthosis Location (Row)  -     Row Name 06/17/21 0914          Spinal Orthosis Management    Type (Spinal Orthosis)  LSO (lumbar sacral orthosis)  -     Wearing Schedule (Spinal Orthosis)  wear when out of bed only  -     Orthosis Training (Spinal Orthosis)  patient;wearing schedule;able to verbalize training  -     Row Name             Wound 06/16/21 0743 abdomen Incision    Wound - Properties Group Placement Date: 06/16/21  -KM Placement Time: 0743  -KM Present on Hospital Admission: N  -KM Location: abdomen  -KM Primary Wound Type: Incision  -KM    Retired Wound - Properties Group Date first assessed: 06/16/21  -KM Time first assessed: 0743  -KM Present on Hospital Admission: N  -KM Location: abdomen  -KM Primary Wound Type: Incision  -KM    Row Name 06/17/21 0914          Plan of Care Review    Plan of Care Reviewed With  patient  -     Progress  improving  -     Outcome Summary  pt trans to EOB cga-min assist, min assist to adis LSO, sit-stand with bed elevated cga-min assist, pt requested use of rwx, pt amb 75 feet with rwx very slow pace cga, trans to chair cga,   -     Row Name 06/17/21 0914          Positioning and Restraints    Pre-Treatment Position  in bed  -     Post Treatment Position  chair  -     In Chair  reclined;call light within reach;encouraged to call for assist;notified [facility]       User Key  (r) = Recorded By, (t) = Taken By, (c) = Cosigned By    Initials Name Provider Type     Sonia Hunt PTA Physical Therapy Assistant    Ml Blas RN Registered Nurse        Physical Therapy Education                 Title: PT OT SLP Therapies (In Progress)     Topic: Physical Therapy (In Progress)     Point: Mobility training (Done)     Learning Progress Summary           Patient Acceptance, E, VU by MS at 6/16/2021 8387    Comment: role of PT in his care, spinal restrictions                   Point: Home exercise program  (Not Started)     Learner Progress:  Not documented in this visit.          Point: Body mechanics (Not Started)     Learner Progress:  Not documented in this visit.          Point: Precautions (Done)     Learning Progress Summary           Patient Acceptance, E,TB, VU by  at 6/17/2021 1006    Comment: LSO when OOB    Acceptance, E, VU by MS at 6/16/2021 1432    Comment: role of PT in his care, spinal restrictions                               User Key     Initials Effective Dates Name Provider Type [facility] 06/16/21 -  Sonia Hunt PTA Physical Therapy Assistant PT    MS 06/19/18 -  Mojgan Person, PT, DPT, NCS Physical Therapist PT              PT Recommendation and Plan     Plan of Care Reviewed With: patient  Progress: improving  Outcome Summary: pt trans to EOB cga-min assist, min assist to adis LSO, sit-stand with bed elevated cga-min assist, pt requested use of rwx, pt amb 75 feet with rwx very slow pace cga, trans to chair cga,        Time Calculation:   PT Charges     Row Name 06/17/21 1006             Time Calculation    Start Time  0914  -      Stop Time  0955  -      Time Calculation (min)  41 min  -      PT Received On  06/17/21  -         Time Calculation- PT    Total Timed Code Minutes- PT  41 minute(s)  -         Timed Charges    53469 - Gait Training Minutes   41  -         Total Minutes    Timed Charges Total Minutes  41  -       Total Minutes  41  -AH        User Key  (r) = Recorded By, (t) = Taken By, (c) = Cosigned By    Initials Name Provider Type     Sonia Hunt PTA Physical Therapy Assistant        Therapy Charges for Today     Code Description Service Date Service Provider Modifiers Qty    [id number] HC GAIT TRAINING EA 15 MIN 6/17/2021 Sonia Hunt PTA GP 3          PT G-Codes  Outcome Measure Options: AM-PAC 6 Clicks Basic Mobility (PT)  AM-PAC 6 Clicks Score (PT): 16  AM-PAC 6 Clicks Score (OT): 18    Sonia Hunt PTA  6/17/2021

## 2021-06-17 NOTE — PROGRESS NOTES
[facility]  Spine Surgery  HERMINIO Cast   Progress Note        Subjective/Overnight Events:  Stable overnight, pain controlled, voiding, tolerating PO    Vitals  Vitals:    06/16/21 1530 06/16/21 1900 06/16/21 2333 06/17/21 0300   BP: 125/71 133/77 122/70 128/76   BP Location: Right arm Right arm Right arm Right arm   Patient Position: Lying Lying Lying Lying   Pulse: 92 91 90 84   Resp: 18 18 16 16   Temp: 98 °F (36.7 °C) 98.8 °F (37.1 °C) 98.8 °F (37.1 °C) 98.5 °F (36.9 °C)   TempSrc: Oral Oral Oral Oral   SpO2: 98% 96% 96% 95%       Current Facility-Administered Medications   Medication Dose Route Frequency Provider Last Rate Last Admin   • cholecalciferol (VITAMIN D3) tablet 1,000 Units  1,000 Units Oral Nightly JULIUS Mirza MD   1,000 Units at 06/16/21 2120   • cyclobenzaprine (FLEXERIL) tablet 10 mg  10 mg Oral TID PRN JULIUS Mirza MD   10 mg at 06/16/21 2119   • diphenhydrAMINE (BENADRYL) capsule 25 mg  25 mg Oral Nightly PRN JULIUS Mirza MD       • famotidine (PEPCID) injection 20 mg  20 mg Intravenous Q12H JULIUS Mirza MD   20 mg at 06/16/21 1206    Or   • famotidine (PEPCID) tablet 20 mg  20 mg Oral Q12H JULIUS Mirza MD   20 mg at 06/16/21 2119   • HYDROmorphone (DILAUDID) injection 1 mg  1 mg Intravenous Q3H PRN JULIUS Mirza MD   1 mg at 06/17/21 0522   • levothyroxine (SYNTHROID, LEVOTHROID) tablet 50 mcg  50 mcg Oral Q AM JULIUS Mirza MD   50 mcg at 06/17/21 0522   • lisinopril (PRINIVIL,ZESTRIL) tablet 10 mg  10 mg Oral Daily JULIUS Mirza MD   10 mg at 06/16/21 1541   • ondansetron (ZOFRAN) tablet 4 mg  4 mg Oral Q6H PRN JULIUS Mirza MD        Or   • ondansetron (ZOFRAN) injection 4 mg  4 mg Intravenous Q6H PRN JULIUS Mirza MD       • oxyCODONE ER (oxyCONTIN) 12 hr tablet 20 mg  20 mg Oral Q12H Parmjit Stahl PA-C   20 mg at 06/16/21 1820   • oxyCODONE-acetaminophen (PERCOCET)   MG per tablet 1 tablet  1 tablet Oral Q4H PRN JULIUS Mirza MD   1 tablet at 06/16/21 2120   • PARoxetine (PAXIL) tablet 20 mg  20 mg Oral QAM JULIUS Mirza MD   20 mg at 06/16/21 1541   • sodium chloride 0.9 % flush 10 mL  10 mL Intravenous PRN JULIUS Mirza MD       • sodium chloride 0.9 % flush 3 mL  3 mL Intravenous Q12H JULIUS Mirza MD   3 mL at 06/16/21 2120   • sodium chloride 0.9 % infusion  75 mL/hr Intravenous Continuous JULIUS Mirza MD       • sodium chloride 0.9 % infusion  75 mL/hr Intravenous Continuous JULIUS Mirza MD 75 mL/hr at 06/16/21 2241 75 mL/hr at 06/16/21 2241   • topiramate (TOPAMAX) tablet 50 mg  50 mg Oral Daily JULIUS Mirza MD   50 mg at 06/16/21 1541       PHYSICAL EXAM:    Orientation:  alert and oriented to person, place, and time    Incision:  no significant drainage, no dehiscence, no significant erythema    Upper Extremity Motor :  5/5 bilaterally    Upper Motor Neuron Signs:  none     Lower Extremity Motor :  equal bilaterally    Lower Extremity Sensory:  Tibial nerve: Intact, Superficial peroneal nerve: Intact and Deep peroneal nerve: Intact    Flatus:  flatus    ABNORMAL EXAM FINDINGS:  none    LABS:    Lab Results (last 24 hours)     Procedure Component Value Units Date/Time    Basic Metabolic Panel [[id number]]  (Abnormal) Collected: 06/17/21 0539    Specimen: Blood Updated: 06/17/21 0654     Glucose 121 mg/dL      BUN 9 mg/dL      Creatinine 0.91 mg/dL      Sodium 139 mmol/L      Potassium 3.8 mmol/L      Chloride 105 mmol/L      CO2 26.0 mmol/L      Calcium 8.7 mg/dL      eGFR Non African Amer 89 mL/min/1.73      BUN/Creatinine Ratio 9.9     Anion Gap 8.0 mmol/L     Narrative:      GFR Normal >60  Chronic Kidney Disease <60  Kidney Failure <15      CBC & Differential [[id number]]  (Abnormal) Collected: 06/17/21 0539    Specimen: Blood Updated: 06/17/21 0633    Narrative:      The following orders were created for panel order CBC &  Differential.  Procedure                               Abnormality         Status                     ---------                               -----------         ------                     CBC Auto Differential[[id number]]        Abnormal            Final result                 Please view results for these tests on the individual orders.    CBC Auto Differential [[id number]]  (Abnormal) Collected: 06/17/21 0539    Specimen: Blood Updated: 06/17/21 0633     WBC 13.02 10*3/mm3      RBC 4.08 10*6/mm3      Hemoglobin 11.7 g/dL      Hematocrit 36.7 %      MCV 90.0 fL      MCH 28.7 pg      MCHC 31.9 g/dL      RDW 13.2 %      RDW-SD 43.1 fl      MPV 9.9 fL      Platelets 264 10*3/mm3      Neutrophil % 70.3 %      Lymphocyte % 18.9 %      Monocyte % 9.8 %      Eosinophil % 0.2 %      Basophil % 0.3 %      Immature Grans % 0.5 %      Neutrophils, Absolute 9.15 10*3/mm3      Lymphocytes, Absolute 2.46 10*3/mm3      Monocytes, Absolute 1.28 10*3/mm3      Eosinophils, Absolute 0.02 10*3/mm3      Basophils, Absolute 0.04 10*3/mm3      Immature Grans, Absolute 0.07 10*3/mm3      nRBC 0.0 /100 WBC           ASSESSMENT AND PLAN:    Post operative day 1 status post anterior lumbar fusion L5/S1    1:  Activity Level:  Up with assist  2:  Pain Control:  Continue current   3:  Discharge Planning:  Pending completion of next stage  4:  Other:  NPO after midnight, encourage ambulation       Electronically signed by HERMINIO Cast 6/17/2021 07:05 CDT

## 2021-06-17 NOTE — PLAN OF CARE
Goal Outcome Evaluation:  Plan of Care Reviewed With: patient, spouse        Progress: improving  Outcome Summary: OT tx completed. Pt alert and oriented x4 and agreeable to therapy. Pt standing at toilet upon arrival, pt completed toileting with CGA and RW. Pt ambulated back to bed with CGA and RW. Pt adjusted straps on LSO with supervision and VC, but impulsively laid down d/t pain and was max A to remove LSO while in bed. Pt performed sit to sidelying with CGA and rolled mod I. Pt c/o pain throughout session which limited participation. Pt would benefit from continued OT to address decreased ADL performance. Recommend d/c home with assist.

## 2021-06-17 NOTE — PLAN OF CARE
Problem: Adult Inpatient Plan of Care  Goal: Plan of Care Review  Outcome: Ongoing, Progressing  Flowsheets  Taken 6/17/2021 0202 by Yumiko Galicia RN  Progress: no change  Plan of Care Reviewed With: patient  Taken 6/16/2021 1939 by Sumi Austin RN  Outcome Summary: A&OX4. VSS. C/o moderate pain that is not fully relieved, pain meds given as ordered w/ good relief per pt. Pt not OOB during shift, does move self in bed and pulls self up. Voiding w/o difficulty using urinal. NS @ 75mL/hr, SCD's for VTE prevention.  Fall and safety precautions intact.  Goal: Patient-Specific Goal (Individualized)  Outcome: Ongoing, Progressing  Goal: Absence of Hospital-Acquired Illness or Injury  Outcome: Ongoing, Progressing  Intervention: Identify and Manage Fall Risk  Recent Flowsheet Documentation  Taken 6/16/2021 2241 by Yumiko Galicia RN  Safety Promotion/Fall Prevention:   fall prevention program maintained   safety round/check completed  Taken 6/16/2021 2004 by Yumiko Galicia RN  Safety Promotion/Fall Prevention:   fall prevention program maintained   safety round/check completed  Intervention: Prevent Skin Injury  Recent Flowsheet Documentation  Taken 6/16/2021 2004 by Yumiko Galicia RN  Body Position: position changed independently  Skin Protection:   adhesive use limited   incontinence pads utilized  Intervention: Prevent and Manage VTE (venous thromboembolism) Risk  Recent Flowsheet Documentation  Taken 6/16/2021 2004 by Yumiko Galicia RN  VTE Prevention/Management:   bilateral   sequential compression devices on  Intervention: Prevent Infection  Recent Flowsheet Documentation  Taken 6/17/2021 0010 by Yumiko Galicia RN  Infection Prevention:   environmental surveillance performed   rest/sleep promoted  Taken 6/16/2021 2241 by Yumiko Galicia RN  Infection Prevention:   environmental surveillance performed   rest/sleep promoted  Taken 6/16/2021 2004 by Yumiko Galicia RN  Infection  Prevention:   environmental surveillance performed   rest/sleep promoted  Goal: Optimal Comfort and Wellbeing  Outcome: Ongoing, Progressing  Goal: Readiness for Transition of Care  Outcome: Ongoing, Progressing     Problem: COPD Comorbidity  Goal: Maintenance of COPD Symptom Control  Outcome: Ongoing, Progressing  Intervention: Maintain COPD-Symptom Control  Recent Flowsheet Documentation  Taken 6/16/2021 2004 by Yumiko Galicia RN  Medication Review/Management: medications reviewed     Problem: Diabetes Comorbidity  Goal: Blood Glucose Level Within Desired Range  Outcome: Ongoing, Progressing     Problem: Heart Failure Comorbidity  Goal: Maintenance of Heart Failure Symptom Control  Outcome: Ongoing, Progressing  Intervention: Maintain Heart Failure-Management Strategies  Recent Flowsheet Documentation  Taken 6/16/2021 2004 by Yumiko Galicia RN  Medication Review/Management: medications reviewed     Problem: Hypertension Comorbidity  Goal: Blood Pressure in Desired Range  Outcome: Ongoing, Progressing  Intervention: Maintain Hypertension-Management Strategies  Recent Flowsheet Documentation  Taken 6/16/2021 2004 by Yumiko Galicia RN  Medication Review/Management: medications reviewed     Problem: Obstructive Sleep Apnea Risk or Actual (Comorbidity Management)  Goal: Unobstructed Breathing During Sleep  Outcome: Ongoing, Progressing     Problem: Pain Chronic (Persistent) (Comorbidity Management)  Goal: Acceptable Pain Control and Functional Ability  Outcome: Ongoing, Progressing  Intervention: Develop Pain Management Plan  Recent Flowsheet Documentation  Taken 6/16/2021 2241 by Yumiko Galicia RN  Pain Management Interventions:   see MAR   quiet environment facilitated  Taken 6/16/2021 2120 by Yumiko Galicia RN  Pain Management Interventions: see MAR  Taken 6/16/2021 2004 by Yumiko Galicia RN  Pain Management Interventions: see MAR  Intervention: Manage Persistent Pain  Recent Flowsheet  Documentation  Taken 6/16/2021 2004 by Yumiko Galicia, RN  Medication Review/Management: medications reviewed   Goal Outcome Evaluation:  Plan of Care Reviewed With: patient        Progress: no change

## 2021-06-17 NOTE — PLAN OF CARE
Goal Outcome Evaluation:  Plan of Care Reviewed With: patient, spouse        Progress: no change  Outcome Summary: A&OX4. VSS. C/o moderate oain that is not fully relieved, order of another painmed given with some relief per pt. I and scheudled pain med available. Asist x1 and LSO brace when out of bed. Voiding w/o difficulty using urinal. NS @ 75mL/hr, SCD's for VTE prevention, and safety maintained. Continue to monitor.

## 2021-06-17 NOTE — CONSULTS
Referring Provider: Dr. Mirza  Reason for Consultation: Medical management    Patient Care Team:  Lev Stallworth MD as PCP - General  Lev Stallworth MD as PCP - Family Medicine    Chief complaint chronic back pain    Subjective .     History of present illness:  The patient presents today for surgical correction after failing conservative management.  They have been through anti-inflammatories, muscle relaxers, and pain medication.  The pain has progressed to the point in time where it is affecting their activities of daily living and after being explained all their options, elected to undergo surgical correction.  Their primary care physician does not attend here at [facility]; therefore, I have been asked to take care of their primary medical needs in the perioperative period.  Postoperative pain is as expected.  There are no other precipitating or relieving factors. I have been requested by the Attending Physician to provide Medical Consultation in the perioperative period. The patient understands my role in their hospitalization and agrees with my treatment plan. They understand the importance of follow up with their PCP upon discharge  from [facility] for any concerns or abnormalities.    Pleasant 46-year-old gentleman followed by Dr. Stallworth for hypercoagulable state related to Mihai Leiden factor, well-controlled hypertension and history of PTSD which is been controlled as well.  Status post step 1 of 2 spine surgery.      REVIEW OF SYSTEMS:    CONSTITUTIONAL:  Negative for anorexia, chills, fevers, night sweats and weight loss  EYES:  negative for eye dryness, icterus and redness  HEENT:   negative for dental problems, epistaxis, facial trauma and thrush  RESPIRATORY:  negative for chest tightness, cough, dyspnea on exertion, pneumonia and sputum  CARDIOVASCULAR: negative for chest pain, dyspnea, exertional chest pressure/discomfort, irregular heart beat, palpitations,  "paroxysmal nocturnal dyspnea and syncope  GASTROINTESTINAL:  negative for abdominal pain, hematemesis, jaundice, melena and rectal bleeding. No significant changes in bowel habits preoperatively.   MUSCULOSKELETAL:  negative for muscle weakness, myalgias and neck pain, outside of surgical issues noted above  NEUROLOGICAL:   negative for dizziness, headaches, seizures, speech problems, tremors and vertigo  INTEGUMENT: negative for pruritus, rash, skin color change and skin lesion(s)         History    Past Medical History:   Diagnosis Date   • Arthritis    • Depression    • Disease of thyroid gland    • Factor V Leiden (CMS/HCC)    • Hypertension    • Infarction of kidney (CMS/HCC)     BLOOD CLOT IN KIDNEY   • Injury of back    • Migraine    • Murmur, heart    • PTSD (post-traumatic stress disorder)      Past Surgical History:   Procedure Laterality Date   • BACK SURGERY     • LUMBAR DISCECTOMY Bilateral 12/1/2017    Procedure: LUMBAR Micro DISCECTOMY LEFT L4-5 and RIGHT L5-S1;  Surgeon: Favio Castillo MD;  Location: [facility];  Service:    • TONSILLECTOMY     • VASECTOMY       Family History   Problem Relation Age of Onset   • Arthritis Mother    • Hypertension Mother    • Clotting disorder Mother    • Aneurysm Father    • No Known Problems Sister    • No Known Problems Brother      Social History     Tobacco Use   • Smoking status: Former Smoker     Packs/day: 1.00     Years: 5.00     Pack years: 5.00     Types: Cigarettes   • Smokeless tobacco: Former User     Types: Snuff   Vaping Use   • Vaping Use: Never used   Substance Use Topics   • Alcohol use: Yes     Comment: occassional/ WEEKENDS   • Drug use: No     Medications Prior to Admission   Medication Sig Dispense Refill Last Dose   • B-D 3CC LUER-SRIDHAR SYR 14SV6-9/2 22G X 1-1/2\" 3 ML misc USE AS DIRECTED  1 Past Week at Unknown time   • cholecalciferol (VITAMIN D3) 25 MCG (1000 UT) tablet Take 1,000 Units by mouth Daily.   6/15/2021 at 2100   • cyclobenzaprine " (FLEXERIL) 10 MG tablet Take 10 mg by mouth Every Night.   6/15/2021 at 2100   • levothyroxine (SYNTHROID, LEVOTHROID) 50 MCG tablet TAKE 1 TABLET BY MOUTH DAILY  11 6/15/2021 at 0700   • lisinopril (PRINIVIL,ZESTRIL) 10 MG tablet TAKE 1 TABLET EVERY DAY  5 6/15/2021 at 0700   • oxyCODONE-acetaminophen (PERCOCET)  MG per tablet Take 1 tablet by mouth Every 4 (Four) Hours As Needed.   6/16/2021 at 0300   • PARoxetine (PAXIL) 20 MG tablet Take 20 mg by mouth Every Morning.   6/15/2021 at 2100   • rivaroxaban (XARELTO) 20 MG tablet Take 20 mg by mouth Daily.   Past Week at 2100   • Testosterone Cypionate (DEPOTESTOTERONE CYPIONATE) 200 MG/ML injection Inject 200 mg into the appropriate muscle as directed by prescriber Every 14 (Fourteen) Days.   Past Week at Unknown time   • topiramate (TOPAMAX) 50 MG tablet Take 50 mg by mouth Daily.   6/15/2021 at 2100       Allergies:  Eggs or egg-derived products    Objective     Vital Signs   Temp:  [98 °F (36.7 °C)-98.8 °F (37.1 °C)] 98.5 °F (36.9 °C)  Heart Rate:  [] 84  Resp:  [10-18] 16  BP: (122-147)/(67-96) 128/76          Physical Exam:  Constitutional: oriented to person, place, and time. appears well-developed.   HEENT:   Head: Normocephalic and atraumatic.   Eyes: Pupils are equal, round, and reactive to light.   Neck: Neck supple.   Cardiovascular: Regular rhythm and normal heart sounds.    Pulmonary/Chest: Effort normal and breath sounds normal. CTAB  Abdominal: Soft. Bowel sounds are normal to hypoactive. No significant distension. There is no tenderness. There is no rebound and no guarding.   Musculoskeletal: Normal range of motion and no edema or tenderness outside of surgical area.   Neurological: Pt is alert and oriented to person, place, and time.  normal reflexes present.   Skin: Skin is warm and dry.     Results Review:   I reviewed the patient's new imaging results and agree with the interpretation.      Assessment/Plan       Essential  hypertension    PFO (patent foramen ovale)    Factor V Leiden (CMS/HCC)    Lumbar stenosis    Hypothyroidism (acquired)    PTSD (post-traumatic stress disorder)    Drug-induced constipation    Hypertension-review pre and post BP's,will restart home BP meds when BP allows. Recent studies demonstrate the need for patience and less reactivity for precipitous drop of BP in the acute care setting. Will educate staff to use other modalities to help reduce MAP prior to adding additional medical interventions. Add clonidine 0.1 mg every 4 hours prn if bp> 140/90,monitor BP and adjust meds as necessary.    GERD-exacerbated by pain medications and anesthesia, will add PPI and or H2 blocker as needed and can step up to Carafate 1 gm po before each meal and nightly. Patient educated about smaller portions with more frequent feedings. Patient also instructed on early mobilization to help with return of normal peristalsis of gut.      Anemia post-op expected-check iron, B12,and folate. Replenish as needed.Transfuse at acceptable levels depending on clinical judgement and comorbidities. Recheck in AM    Constipation-start with Miralax 1 capful BID until BM, then decrease to 1 x a day,then can step up to Amitiza 24 mcg po BID which can be used for opioid induced constipation,and ultimately end up with Relistor 12 mcg subq every 48 hours to block the effect of narcotics on the gut.    Hypercoagulable state/and Leiden factor-restart anticoagulation when cleared by spine surgery.    I discussed the patient's findings and my recommendations with patient, nursing staff and consulting provider    Osman Wagner MD  06/17/21  07:20 CDT

## 2021-06-18 ENCOUNTER — APPOINTMENT (OUTPATIENT)
Dept: GENERAL RADIOLOGY | Facility: HOSPITAL | Age: 46
End: 2021-06-18

## 2021-06-18 ENCOUNTER — ANESTHESIA EVENT (OUTPATIENT)
Dept: PERIOP | Facility: HOSPITAL | Age: 46
End: 2021-06-18

## 2021-06-18 ENCOUNTER — ANESTHESIA (OUTPATIENT)
Dept: PERIOP | Facility: HOSPITAL | Age: 46
End: 2021-06-18

## 2021-06-18 LAB
FOLATE SERPL-MCNC: 7.71 NG/ML (ref 4.78–24.2)
IRON 24H UR-MRATE: 21 MCG/DL (ref 59–158)
IRON SATN MFR SERPL: 8 % (ref 20–50)
SARS-COV-2 RNA PNL SPEC NAA+PROBE: NOT DETECTED
TIBC SERPL-MCNC: 255 MCG/DL (ref 298–536)
TRANSFERRIN SERPL-MCNC: 171 MG/DL (ref 200–360)
VIT B12 BLD-MCNC: 405 PG/ML (ref 211–946)

## 2021-06-18 PROCEDURE — C1713 ANCHOR/SCREW BN/BN,TIS/BN: HCPCS | Performed by: ORTHOPAEDIC SURGERY

## 2021-06-18 PROCEDURE — 25010000002 CEFAZOLIN 1-4 GM/50ML-% SOLUTION: Performed by: PHYSICIAN ASSISTANT

## 2021-06-18 PROCEDURE — 25010000002 HYDROMORPHONE PER 4 MG: Performed by: NURSE ANESTHETIST, CERTIFIED REGISTERED

## 2021-06-18 PROCEDURE — 76000 FLUOROSCOPY <1 HR PHYS/QHP: CPT

## 2021-06-18 PROCEDURE — 82746 ASSAY OF FOLIC ACID SERUM: CPT | Performed by: FAMILY MEDICINE

## 2021-06-18 PROCEDURE — 25010000003 BUPIVACAINE LIPOSOME 1.3 % SUSPENSION 20 ML VIAL: Performed by: ORTHOPAEDIC SURGERY

## 2021-06-18 PROCEDURE — 97116 GAIT TRAINING THERAPY: CPT

## 2021-06-18 PROCEDURE — C9290 INJ, BUPIVACAINE LIPOSOME: HCPCS | Performed by: ORTHOPAEDIC SURGERY

## 2021-06-18 PROCEDURE — 25010000002 ONDANSETRON PER 1 MG: Performed by: NURSE ANESTHETIST, CERTIFIED REGISTERED

## 2021-06-18 PROCEDURE — 97535 SELF CARE MNGMENT TRAINING: CPT | Performed by: OCCUPATIONAL THERAPIST

## 2021-06-18 PROCEDURE — 97164 PT RE-EVAL EST PLAN CARE: CPT

## 2021-06-18 PROCEDURE — C1769 GUIDE WIRE: HCPCS | Performed by: ORTHOPAEDIC SURGERY

## 2021-06-18 PROCEDURE — 97168 OT RE-EVAL EST PLAN CARE: CPT | Performed by: OCCUPATIONAL THERAPIST

## 2021-06-18 PROCEDURE — 25010000002 PROPOFOL 10 MG/ML EMULSION: Performed by: NURSE ANESTHETIST, CERTIFIED REGISTERED

## 2021-06-18 PROCEDURE — 87635 SARS-COV-2 COVID-19 AMP PRB: CPT | Performed by: ORTHOPAEDIC SURGERY

## 2021-06-18 PROCEDURE — 83540 ASSAY OF IRON: CPT | Performed by: FAMILY MEDICINE

## 2021-06-18 PROCEDURE — 25010000003 HYDROMORPHONE 1 MG/ML SOLUTION: Performed by: PHYSICIAN ASSISTANT

## 2021-06-18 PROCEDURE — 25010000002 FENTANYL CITRATE (PF) 250 MCG/5ML SOLUTION: Performed by: NURSE ANESTHETIST, CERTIFIED REGISTERED

## 2021-06-18 PROCEDURE — 0SG3071 FUSION OF LUMBOSACRAL JOINT WITH AUTOLOGOUS TISSUE SUBSTITUTE, POSTERIOR APPROACH, POSTERIOR COLUMN, OPEN APPROACH: ICD-10-PCS | Performed by: ORTHOPAEDIC SURGERY

## 2021-06-18 PROCEDURE — 72100 X-RAY EXAM L-S SPINE 2/3 VWS: CPT

## 2021-06-18 PROCEDURE — 84466 ASSAY OF TRANSFERRIN: CPT | Performed by: FAMILY MEDICINE

## 2021-06-18 PROCEDURE — 82607 VITAMIN B-12: CPT | Performed by: FAMILY MEDICINE

## 2021-06-18 DEVICE — CANNULATED EXTENDED TAB POLYAXIAL REDUCTION SCREW, 6.5 MM X 45 MM
Type: IMPLANTABLE DEVICE | Site: SPINE LUMBAR | Status: FUNCTIONAL
Brand: INVICTUS

## 2021-06-18 DEVICE — SET SCREW
Type: IMPLANTABLE DEVICE | Site: SPINE LUMBAR | Status: FUNCTIONAL
Brand: INVICTUS

## 2021-06-18 DEVICE — TI MIS LORDOTIC ROD, 5.5 MM X 40 MM
Type: IMPLANTABLE DEVICE | Site: SPINE LUMBAR | Status: FUNCTIONAL
Brand: INVICTUS

## 2021-06-18 RX ORDER — OXYCODONE AND ACETAMINOPHEN 10; 325 MG/1; MG/1
1 TABLET ORAL ONCE AS NEEDED
Status: DISCONTINUED | OUTPATIENT
Start: 2021-06-18 | End: 2021-06-18 | Stop reason: HOSPADM

## 2021-06-18 RX ORDER — FERROUS SULFATE 325(65) MG
325 TABLET ORAL
Status: DISCONTINUED | OUTPATIENT
Start: 2021-06-18 | End: 2021-06-19 | Stop reason: HOSPADM

## 2021-06-18 RX ORDER — MAGNESIUM HYDROXIDE 1200 MG/15ML
LIQUID ORAL AS NEEDED
Status: DISCONTINUED | OUTPATIENT
Start: 2021-06-18 | End: 2021-06-18 | Stop reason: HOSPADM

## 2021-06-18 RX ORDER — MIDAZOLAM HYDROCHLORIDE 1 MG/ML
1 INJECTION INTRAMUSCULAR; INTRAVENOUS
Status: DISCONTINUED | OUTPATIENT
Start: 2021-06-18 | End: 2021-06-18 | Stop reason: HOSPADM

## 2021-06-18 RX ORDER — LABETALOL HYDROCHLORIDE 5 MG/ML
5 INJECTION, SOLUTION INTRAVENOUS
Status: DISCONTINUED | OUTPATIENT
Start: 2021-06-18 | End: 2021-06-18 | Stop reason: HOSPADM

## 2021-06-18 RX ORDER — OXYCODONE HCL 20 MG/1
20 TABLET, FILM COATED, EXTENDED RELEASE ORAL ONCE
Status: COMPLETED | OUTPATIENT
Start: 2021-06-18 | End: 2021-06-18

## 2021-06-18 RX ORDER — FLUMAZENIL 0.1 MG/ML
0.2 INJECTION INTRAVENOUS AS NEEDED
Status: DISCONTINUED | OUTPATIENT
Start: 2021-06-18 | End: 2021-06-18 | Stop reason: HOSPADM

## 2021-06-18 RX ORDER — ACETAMINOPHEN 500 MG
1000 TABLET ORAL ONCE
Status: DISCONTINUED | OUTPATIENT
Start: 2021-06-18 | End: 2021-06-18 | Stop reason: HOSPADM

## 2021-06-18 RX ORDER — FENTANYL CITRATE 50 UG/ML
INJECTION, SOLUTION INTRAMUSCULAR; INTRAVENOUS AS NEEDED
Status: DISCONTINUED | OUTPATIENT
Start: 2021-06-18 | End: 2021-06-18 | Stop reason: SURG

## 2021-06-18 RX ORDER — IBUPROFEN 600 MG/1
600 TABLET ORAL ONCE AS NEEDED
Status: DISCONTINUED | OUTPATIENT
Start: 2021-06-18 | End: 2021-06-18 | Stop reason: HOSPADM

## 2021-06-18 RX ORDER — PROPOFOL 10 MG/ML
VIAL (ML) INTRAVENOUS AS NEEDED
Status: DISCONTINUED | OUTPATIENT
Start: 2021-06-18 | End: 2021-06-18 | Stop reason: SURG

## 2021-06-18 RX ORDER — LIDOCAINE HYDROCHLORIDE 10 MG/ML
0.5 INJECTION, SOLUTION EPIDURAL; INFILTRATION; INTRACAUDAL; PERINEURAL ONCE AS NEEDED
Status: DISCONTINUED | OUTPATIENT
Start: 2021-06-18 | End: 2021-06-18 | Stop reason: HOSPADM

## 2021-06-18 RX ORDER — LIDOCAINE HYDROCHLORIDE 40 MG/ML
SOLUTION TOPICAL AS NEEDED
Status: DISCONTINUED | OUTPATIENT
Start: 2021-06-18 | End: 2021-06-18 | Stop reason: SURG

## 2021-06-18 RX ORDER — ROCURONIUM BROMIDE 10 MG/ML
INJECTION, SOLUTION INTRAVENOUS AS NEEDED
Status: DISCONTINUED | OUTPATIENT
Start: 2021-06-18 | End: 2021-06-18 | Stop reason: SURG

## 2021-06-18 RX ORDER — CEFAZOLIN SODIUM IN 0.9 % NACL 3 G/100 ML
3 INTRAVENOUS SOLUTION, PIGGYBACK (ML) INTRAVENOUS ONCE
Status: COMPLETED | OUTPATIENT
Start: 2021-06-18 | End: 2021-06-18

## 2021-06-18 RX ORDER — HYDROMORPHONE HYDROCHLORIDE 1 MG/ML
0.5 INJECTION, SOLUTION INTRAMUSCULAR; INTRAVENOUS; SUBCUTANEOUS
Status: DISCONTINUED | OUTPATIENT
Start: 2021-06-18 | End: 2021-06-18 | Stop reason: HOSPADM

## 2021-06-18 RX ORDER — NALOXONE HCL 0.4 MG/ML
0.04 VIAL (ML) INJECTION AS NEEDED
Status: DISCONTINUED | OUTPATIENT
Start: 2021-06-18 | End: 2021-06-18 | Stop reason: HOSPADM

## 2021-06-18 RX ORDER — ONDANSETRON 2 MG/ML
4 INJECTION INTRAMUSCULAR; INTRAVENOUS AS NEEDED
Status: DISCONTINUED | OUTPATIENT
Start: 2021-06-18 | End: 2021-06-18 | Stop reason: HOSPADM

## 2021-06-18 RX ORDER — SODIUM CHLORIDE 0.9 % (FLUSH) 0.9 %
3 SYRINGE (ML) INJECTION EVERY 12 HOURS SCHEDULED
Status: DISCONTINUED | OUTPATIENT
Start: 2021-06-18 | End: 2021-06-18 | Stop reason: HOSPADM

## 2021-06-18 RX ORDER — SODIUM CHLORIDE, SODIUM LACTATE, POTASSIUM CHLORIDE, CALCIUM CHLORIDE 600; 310; 30; 20 MG/100ML; MG/100ML; MG/100ML; MG/100ML
100 INJECTION, SOLUTION INTRAVENOUS CONTINUOUS
Status: DISCONTINUED | OUTPATIENT
Start: 2021-06-18 | End: 2021-06-19 | Stop reason: HOSPADM

## 2021-06-18 RX ORDER — ONDANSETRON 2 MG/ML
INJECTION INTRAMUSCULAR; INTRAVENOUS AS NEEDED
Status: DISCONTINUED | OUTPATIENT
Start: 2021-06-18 | End: 2021-06-18 | Stop reason: SURG

## 2021-06-18 RX ORDER — SODIUM CHLORIDE 9 MG/ML
75 INJECTION, SOLUTION INTRAVENOUS CONTINUOUS
Status: DISPENSED | OUTPATIENT
Start: 2021-06-18 | End: 2021-06-19

## 2021-06-18 RX ORDER — SODIUM CHLORIDE 0.9 % (FLUSH) 0.9 %
3-10 SYRINGE (ML) INJECTION AS NEEDED
Status: DISCONTINUED | OUTPATIENT
Start: 2021-06-18 | End: 2021-06-18 | Stop reason: HOSPADM

## 2021-06-18 RX ORDER — FENTANYL CITRATE 50 UG/ML
25 INJECTION, SOLUTION INTRAMUSCULAR; INTRAVENOUS
Status: DISCONTINUED | OUTPATIENT
Start: 2021-06-18 | End: 2021-06-18 | Stop reason: HOSPADM

## 2021-06-18 RX ORDER — CEFAZOLIN SODIUM 1 G/50ML
1 INJECTION, SOLUTION INTRAVENOUS EVERY 8 HOURS
Status: COMPLETED | OUTPATIENT
Start: 2021-06-18 | End: 2021-06-19

## 2021-06-18 RX ORDER — NEOSTIGMINE METHYLSULFATE 5 MG/5 ML
SYRINGE (ML) INTRAVENOUS AS NEEDED
Status: DISCONTINUED | OUTPATIENT
Start: 2021-06-18 | End: 2021-06-18 | Stop reason: SURG

## 2021-06-18 RX ORDER — LIDOCAINE HYDROCHLORIDE 20 MG/ML
INJECTION, SOLUTION EPIDURAL; INFILTRATION; INTRACAUDAL; PERINEURAL AS NEEDED
Status: DISCONTINUED | OUTPATIENT
Start: 2021-06-18 | End: 2021-06-18 | Stop reason: SURG

## 2021-06-18 RX ADMIN — HYDROMORPHONE HYDROCHLORIDE 0.5 MG: 1 INJECTION, SOLUTION INTRAMUSCULAR; INTRAVENOUS; SUBCUTANEOUS at 09:37

## 2021-06-18 RX ADMIN — OXYCODONE AND ACETAMINOPHEN 1 TABLET: 325; 10 TABLET ORAL at 06:43

## 2021-06-18 RX ADMIN — FERROUS SULFATE TAB 325 MG (65 MG ELEMENTAL FE) 325 MG: 325 (65 FE) TAB at 12:46

## 2021-06-18 RX ADMIN — CEFAZOLIN SODIUM 1 G: 1 INJECTION, SOLUTION INTRAVENOUS at 17:43

## 2021-06-18 RX ADMIN — OXYCODONE HYDROCHLORIDE 20 MG: 20 TABLET, FILM COATED, EXTENDED RELEASE ORAL at 07:53

## 2021-06-18 RX ADMIN — OXYCODONE AND ACETAMINOPHEN 1 TABLET: 325; 10 TABLET ORAL at 15:36

## 2021-06-18 RX ADMIN — ACETAMINOPHEN 650 MG: 325 TABLET, FILM COATED ORAL at 20:41

## 2021-06-18 RX ADMIN — HYDROMORPHONE HYDROCHLORIDE 1 MG: 1 INJECTION, SOLUTION INTRAMUSCULAR; INTRAVENOUS; SUBCUTANEOUS at 16:42

## 2021-06-18 RX ADMIN — OXYCODONE AND ACETAMINOPHEN 1 TABLET: 325; 10 TABLET ORAL at 20:33

## 2021-06-18 RX ADMIN — FENTANYL CITRATE 100 MCG: 50 INJECTION, SOLUTION INTRAMUSCULAR; INTRAVENOUS at 08:09

## 2021-06-18 RX ADMIN — TOPIRAMATE 50 MG: 25 TABLET, FILM COATED ORAL at 10:32

## 2021-06-18 RX ADMIN — GLYCOPYRROLATE 0.4 MG: 0.2 INJECTION, SOLUTION INTRAMUSCULAR; INTRAVENOUS at 08:37

## 2021-06-18 RX ADMIN — ROCURONIUM BROMIDE 20 MG: 10 INJECTION INTRAVENOUS at 08:08

## 2021-06-18 RX ADMIN — OXYCODONE AND ACETAMINOPHEN 1 TABLET: 325; 10 TABLET ORAL at 23:57

## 2021-06-18 RX ADMIN — OXYCODONE AND ACETAMINOPHEN 1 TABLET: 325; 10 TABLET ORAL at 11:26

## 2021-06-18 RX ADMIN — OXYCODONE AND ACETAMINOPHEN 1 TABLET: 325; 10 TABLET ORAL at 00:03

## 2021-06-18 RX ADMIN — CEFAZOLIN 3 G: 1 INJECTION, POWDER, FOR SOLUTION INTRAMUSCULAR; INTRAVENOUS; PARENTERAL at 08:08

## 2021-06-18 RX ADMIN — SODIUM CHLORIDE, PRESERVATIVE FREE 3 ML: 5 INJECTION INTRAVENOUS at 20:46

## 2021-06-18 RX ADMIN — FENTANYL CITRATE 150 MCG: 50 INJECTION, SOLUTION INTRAMUSCULAR; INTRAVENOUS at 08:05

## 2021-06-18 RX ADMIN — Medication 1000 UNITS: at 20:41

## 2021-06-18 RX ADMIN — CEFAZOLIN SODIUM 1 G: 1 INJECTION, SOLUTION INTRAVENOUS at 23:56

## 2021-06-18 RX ADMIN — SODIUM CHLORIDE 75 ML/HR: 9 INJECTION, SOLUTION INTRAVENOUS at 22:45

## 2021-06-18 RX ADMIN — HYDROMORPHONE HYDROCHLORIDE 0.5 MG: 1 INJECTION, SOLUTION INTRAMUSCULAR; INTRAVENOUS; SUBCUTANEOUS at 09:27

## 2021-06-18 RX ADMIN — SODIUM CHLORIDE, POTASSIUM CHLORIDE, SODIUM LACTATE AND CALCIUM CHLORIDE: 600; 310; 30; 20 INJECTION, SOLUTION INTRAVENOUS at 08:15

## 2021-06-18 RX ADMIN — ROCURONIUM BROMIDE 30 MG: 10 INJECTION INTRAVENOUS at 08:05

## 2021-06-18 RX ADMIN — SODIUM CHLORIDE, POTASSIUM CHLORIDE, SODIUM LACTATE AND CALCIUM CHLORIDE: 600; 310; 30; 20 INJECTION, SOLUTION INTRAVENOUS at 07:56

## 2021-06-18 RX ADMIN — LIDOCAINE HYDROCHLORIDE 1 EACH: 40 SOLUTION TOPICAL at 08:09

## 2021-06-18 RX ADMIN — LIDOCAINE HYDROCHLORIDE 60 MG: 20 INJECTION, SOLUTION EPIDURAL; INFILTRATION; INTRACAUDAL; PERINEURAL at 08:09

## 2021-06-18 RX ADMIN — PROPOFOL 200 MG: 10 INJECTION, EMULSION INTRAVENOUS at 08:05

## 2021-06-18 RX ADMIN — SODIUM CHLORIDE 75 ML/HR: 9 INJECTION, SOLUTION INTRAVENOUS at 10:24

## 2021-06-18 RX ADMIN — OXYCODONE HYDROCHLORIDE 20 MG: 20 TABLET, FILM COATED, EXTENDED RELEASE ORAL at 20:41

## 2021-06-18 RX ADMIN — CYCLOBENZAPRINE 10 MG: 10 TABLET, FILM COATED ORAL at 10:33

## 2021-06-18 RX ADMIN — LISINOPRIL 10 MG: 10 TABLET ORAL at 10:32

## 2021-06-18 RX ADMIN — POLYETHYLENE GLYCOL (3350) 17 G: 17 POWDER, FOR SOLUTION ORAL at 10:33

## 2021-06-18 RX ADMIN — Medication 3 MG: at 08:37

## 2021-06-18 RX ADMIN — HYDROMORPHONE HYDROCHLORIDE 1 MG: 1 INJECTION, SOLUTION INTRAMUSCULAR; INTRAVENOUS; SUBCUTANEOUS at 12:46

## 2021-06-18 RX ADMIN — FAMOTIDINE 20 MG: 10 INJECTION INTRAVENOUS at 10:34

## 2021-06-18 RX ADMIN — SODIUM CHLORIDE, PRESERVATIVE FREE 3 ML: 5 INJECTION INTRAVENOUS at 10:35

## 2021-06-18 RX ADMIN — POLYETHYLENE GLYCOL (3350) 17 G: 17 POWDER, FOR SOLUTION ORAL at 20:42

## 2021-06-18 RX ADMIN — ONDANSETRON 4 MG: 2 INJECTION INTRAMUSCULAR; INTRAVENOUS at 08:37

## 2021-06-18 RX ADMIN — FAMOTIDINE 20 MG: 20 TABLET, FILM COATED ORAL at 20:42

## 2021-06-18 NOTE — THERAPY RE-EVALUATION
Patient Name: Alin Richard  : 1975    MRN: [id number]                              Today's Date: 2021       Admit Date: 2021    Visit Dx:     ICD-10-CM ICD-9-CM   1. Impaired mobility  Z74.09 799.89   2. Decreased activities of daily living (ADL)  Z78.9 V49.89     Patient Active Problem List   Diagnosis   • Displacement of lumbar intervertebral disc without myelopathy   • BMI 38.0-38.9,adult   • Non-smoker   • Renal infarction (CMS/HCC)   • Essential hypertension   • Herniated intervertebral disc of lumbar spine   • Disease of thyroid gland   • PFO (patent foramen ovale)   • Degeneration of lumbar or lumbosacral intervertebral disc   • Lumbar radiculopathy   • Factor V Leiden (CMS/HCC)   • Migraine   • Lumbar stenosis   • Hypothyroidism (acquired)   • PTSD (post-traumatic stress disorder)   • Drug-induced constipation     Past Medical History:   Diagnosis Date   • Arthritis    • Depression    • Disease of thyroid gland    • Factor V Leiden (CMS/HCC)    • Hypertension    • Infarction of kidney (CMS/HCC)     BLOOD CLOT IN KIDNEY   • Injury of back    • Migraine    • Murmur, heart    • PTSD (post-traumatic stress disorder)      Past Surgical History:   Procedure Laterality Date   • BACK SURGERY     • LUMBAR DISCECTOMY Bilateral 2017    Procedure: LUMBAR Micro DISCECTOMY LEFT L4-5 and RIGHT L5-S1;  Surgeon: Favio Castillo MD;  Location: [facility];  Service:    • TONSILLECTOMY     • VASECTOMY       General Information     Row Name 21 1302          OT Time and Intention    Document Type  re-evaluation Pt s/p PSF L5-S1.  -JJ     Mode of Treatment  occupational therapy  -JJ     Row Name 21 1307          General Information    Patient Profile Reviewed  yes  -JJ     Existing Precautions/Restrictions  brace worn when out of bed;fall;spinal  -JJ     Barriers to Rehab  physical barrier  -JJ     Row Name 21 1308          Cognition    Orientation Status (Cognition)  oriented x 4  -JJ      Row Name 06/18/21 1305          Safety Issues, Functional Mobility    Impairments Affecting Function (Mobility)  balance;endurance/activity tolerance;strength;pain  -       User Key  (r) = Recorded By, (t) = Taken By, (c) = Cosigned By    Initials Name Provider Type    Africa Melendez OTR/L Occupational Therapist          Mobility/ADL's     Row Name 06/18/21 1305          Bed Mobility    Bed Mobility  sit-sidelying;sidelying-sit  -J     Sidelying-Sit [place] (Bed Mobility)  contact guard;minimum assist (75% patient effort);verbal cues  -J     Sit-Sidelying [place] (Bed Mobility)  contact guard;verbal cues;nonverbal cues (demo/gesture)  -     Assistive Device (Bed Mobility)  bed rails;head of bed elevated  -     Row Name 06/18/21 1305          Transfers    Transfers  sit-stand transfer  -     Comment (Transfers)  from elevated suface.  -     Sit-Stand [place] (Transfers)  contact guard  -     Row Name 06/18/21 1305          Functional Mobility    Functional Mobility- Ind. Level  contact guard assist;verbal cues required;nonverbal cues required (demo/gesture)  -     Functional Mobility- Comment  bed, down hallway and back to bed.  -     Row Name 06/18/21 1305          Activities of Daily Living    BADL Assessment/Intervention  upper body dressing  -     Row Name 06/18/21 1305          Upper Body Dressing Assessment/Training    [place] Level (Upper Body Dressing)  don;doff;set up;supervision  -     Position (Upper Body Dressing)  sitting up in bed  -     Comment (Upper Body Dressing)  LSO  -       User Key  (r) = Recorded By, (t) = Taken By, (c) = Cosigned By    Initials Name Provider Type    Africa Melendez OTR/L Occupational Therapist        Obj/Interventions     Row Name 06/18/21 1305          Sensory Assessment (Somatosensory)    Sensory Assessment (Somatosensory)  UE sensation intact  -     Row Name 06/18/21 1305          Range of Motion  Comprehensive    General Range of Motion  bilateral upper extremity ROM WFL  -     Row Name 06/18/21 1305          Strength Comprehensive (MMT)    Comment, General Manual Muscle Testing (MMT) Assessment  B UE strength 5/5  -     Row Name 06/18/21 1305          Balance    Balance Assessment  sitting static balance;standing static balance  -     Static Sitting Balance  WFL;unsupported  -JJ     Static Standing Balance  WFL;supported  -JJ       User Key  (r) = Recorded By, (t) = Taken By, (c) = Cosigned By    Initials Name Provider Type    Africa Melendez, OTR/L Occupational Therapist        Goals/Plan     Row Name 06/18/21 1305          Transfer Goal 1 (OT)    Activity/Assistive Device (Transfer Goal 1, OT)  sit-to-stand/stand-to-sit;bed-to-chair/chair-to-bed;toilet;shower chair;walk-in shower  -JJ     [place] Level/Cues Needed (Transfer Goal 1, OT)  supervision required  -JJ     Time Frame (Transfer Goal 1, OT)  long term goal (LTG);10 days  -JJ     Progress/Outcome (Transfer Goal 1, OT)  goal ongoing;goal not met  -[facility] Name 06/18/21 1305          Dressing Goal 1 (OT)    Activity/Device (Dressing Goal 1, OT)  upper body dressing;lower body dressing  -JJ     [place]/Cues Needed (Dressing Goal 1, OT)  -- Independent for LSO, Ruthie for LB dressing  -JJ     Time Frame (Dressing Goal 1, OT)  long term goal (LTG);10 days  -JJ     Progress/Outcome (Dressing Goal 1, OT)  goal partially met;goal revised this date  -     Row Name 06/18/21 1305          Toileting Goal 1 (OT)    Activity/Device (Toileting Goal 1, OT)  toileting skills, all;commode  -JJ     [place] Level/Cues Needed (Toileting Goal 1, OT)  supervision required  -JJ     Time Frame (Toileting Goal 1, OT)  long term goal (LTG);10 days  -JJ     Progress/Outcome (Toileting Goal 1, OT)  goal not met;goal ongoing  -[facility] Name 06/18/21 1305          Therapy Assessment/Plan (OT)    Planned Therapy Interventions (OT)  activity  tolerance training;adaptive equipment training;BADL retraining;functional balance retraining;occupation/activity based interventions;patient/caregiver education/training;transfer/mobility retraining;strengthening exercise  -       User Key  (r) = Recorded By, (t) = Taken By, (c) = Cosigned By    Initials Name Provider Type    Africa Melendez, OTR/L Occupational Therapist        Clinical Impression     Row Name 06/18/21 1305          Pain Assessment    Additional Documentation  Pain Scale: FACES Pre/Post-Treatment (Group)  -     Row Name 06/18/21 1305          Pain Scale: Numbers Pre/Post-Treatment    Pain Location - Orientation  incisional  -     Pain Intervention(s)  Medication (See MAR);Ambulation/increased activity;Repositioned  -     Row Name 06/18/21 1300          Pain Scale: FACES Pre/Post-Treatment    Pain: FACES Scale, Pretreatment  4-->hurts little more  -     Posttreatment Pain Rating  6-->hurts even more  -     Row Name 06/18/21 1300          Plan of Care Review    Plan of Care Reviewed With  patient  -ELIA     Progress  improving  -     Outcome Summary  OT re-eval completed s/p PSF L5-S1. He is alert and oriented x4 with spouse at bedside. He was re-educated on spinal precuations, LSO fitting/mgt and wear schedule. CGA/Min A for sidelying <> sit at EOB. Set-up and Sup for donning LSO seated at EOB. CGA for sit <> stand t/f from EOB and all functional mobility. Continue OT to increase his independence and safety with adls. Anticipate d/c home with spouse.  -     Row Name 06/18/21 4812          Therapy Assessment/Plan (OT)    Patient/Family Therapy Goal Statement (OT)  not stated at this time.  -     Rehab Potential (OT)  good, to achieve stated therapy goals  -     Criteria for Skilled Therapeutic Interventions Met (OT)  yes;skilled treatment is necessary  -     Therapy Frequency (OT)  5 times/wk  -     Predicted Duration of Therapy Intervention (OT)  10 days  -     Row Name  06/18/21 1305          Therapy Plan Review/Discharge Plan (OT)    Anticipated Discharge Disposition (OT)  home with assist  -     Row Name 06/18/21 1305          Vital Signs    Pre Patient Position  Side Lying  -JJ     Intra Patient Position  Standing  -JJ     Post Patient Position  Supine  -JJ     Row Name 06/18/21 1305          Positioning and Restraints    Pre-Treatment Position  in bed  -JJ     Post Treatment Position  bed  -JJ     In Bed  notified nsg;fowlers;call light within reach;encouraged to call for assist;with family/caregiver;side rails up x2;SCD pump applied  -JJ       User Key  (r) = Recorded By, (t) = Taken By, (c) = Cosigned By    Initials Name Provider Type    Africa Melendez OTR/L Occupational Therapist        Outcome Measures     Row Name 06/18/21 1305          How much help from another is currently needed...    Putting on and taking off regular lower body clothing?  2  -JJ     Bathing (including washing, rinsing, and drying)  2  -JJ     Toileting (which includes using toilet bed pan or urinal)  3  -JJ     Putting on and taking off regular upper body clothing  3  -JJ     Taking care of personal grooming (such as brushing teeth)  4  -JJ     Eating meals  4  -JJ     AM-PAC 6 Clicks Score (OT)  18  -     Row Name 06/18/21 1305          Functional Assessment    Outcome Measure Options  AM-PAC 6 Clicks Daily Activity (OT)  -JJ       User Key  (r) = Recorded By, (t) = Taken By, (c) = Cosigned By    Initials Name Provider Type    Africa Melendez OTR/L Occupational Therapist        Occupational Therapy Education                 Title: PT OT SLP Therapies (In Progress)     Topic: Occupational Therapy (Done)     Point: ADL training (Done)     Description:   Instruct learner(s) on proper safety adaptation and remediation techniques during self care or transfers.   Instruct in proper use of assistive devices.              Learning Progress Summary           Patient Acceptance, E, VU by ELIA  at 6/18/2021 1350    Acceptance, E,TB, VU by CE at 6/17/2021 1354    Comment: Pt educated on spinal precautions, brace management, and log rolling technique. Pt educated on body positioning during LB dressing. Pt educated on LB dressing completion at home and maintaining safety.    Acceptance, E,D, VU,NR by PRABHJOT at 6/16/2021 1507   Significant Other Acceptance, E,TB, VU by CE at 6/17/2021 1354    Comment: Pt educated on spinal precautions, brace management, and log rolling technique. Pt educated on body positioning during LB dressing. Pt educated on LB dressing completion at home and maintaining safety.                   Point: Home exercise program (Done)     Description:   Instruct learner(s) on appropriate technique for monitoring, assisting and/or progressing therapeutic exercises/activities.              Learning Progress Summary           Patient Acceptance, E,TB, VU by CE at 6/17/2021 1354    Comment: Pt educated on spinal precautions, brace management, and log rolling technique. Pt educated on body positioning during LB dressing. Pt educated on LB dressing completion at home and maintaining safety.    Acceptance, E,D, VU,NR by PRABHJOT at 6/16/2021 1507   Significant Other Acceptance, E,TB, VU by CE at 6/17/2021 1354    Comment: Pt educated on spinal precautions, brace management, and log rolling technique. Pt educated on body positioning during LB dressing. Pt educated on LB dressing completion at home and maintaining safety.                   Point: Precautions (Done)     Description:   Instruct learner(s) on prescribed precautions during self-care and functional transfers.              Learning Progress Summary           Patient Acceptance, E, VU by ELIA at 6/18/2021 1350    Acceptance, E,TB, VU by CE at 6/17/2021 1354    Comment: Pt educated on spinal precautions, brace management, and log rolling technique. Pt educated on body positioning during LB dressing. Pt educated on LB dressing completion at home and  maintaining safety.    Acceptance, E,D, VU,NR by  at 6/16/2021 1507   Significant Other Acceptance, E,TB, VU by CE at 6/17/2021 1354    Comment: Pt educated on spinal precautions, brace management, and log rolling technique. Pt educated on body positioning during LB dressing. Pt educated on LB dressing completion at home and maintaining safety.                   Point: Body mechanics (Done)     Description:   Instruct learner(s) on proper positioning and spine alignment during self-care, functional mobility activities and/or exercises.              Learning Progress Summary           Patient Acceptance, E, VU by ELIA at 6/18/2021 1350    Acceptance, E,TB, VU by CE at 6/17/2021 1354    Comment: Pt educated on spinal precautions, brace management, and log rolling technique. Pt educated on body positioning during LB dressing. Pt educated on LB dressing completion at home and maintaining safety.    Acceptance, E,D, VU,NR by  at 6/16/2021 1507   Significant Other Acceptance, E,TB, VU by CE at 6/17/2021 1354    Comment: Pt educated on spinal precautions, brace management, and log rolling technique. Pt educated on body positioning during LB dressing. Pt educated on LB dressing completion at home and maintaining safety.                               User Key     Initials Effective Dates Name Provider Type Discipline     08/28/18 -  Eva Solano, OTR/L Occupational Therapist OT    JJ 06/16/21 -  Africa Hickey OTR/L Occupational Therapist OT    GURMEET 05/10/21 -  Lucretia Carranza OT Student OT Student OT              OT Recommendation and Plan  Planned Therapy Interventions (OT): activity tolerance training, adaptive equipment training, BADL retraining, functional balance retraining, occupation/activity based interventions, patient/caregiver education/training, transfer/mobility retraining, strengthening exercise  Therapy Frequency (OT): 5 times/wk  Plan of Care Review  Plan of Care Reviewed With: patient  Progress:  improving  Outcome Summary: OT re-eval completed s/p PSF L5-S1. He is alert and oriented x4 with spouse at bedside. He was re-educated on spinal precuations, LSO fitting/mgt and wear schedule. CGA/Min A for sidelying <> sit at EOB. Set-up and Sup for donning LSO seated at EOB. CGA for sit <> stand t/f from EOB and all functional mobility. Continue OT to increase his independence and safety with adls. Anticipate d/c home with spouse.     Time Calculation:   Time Calculation- OT     Row Name 06/18/21 1350             Time Calculation- OT    OT Start Time  1305 add 8 minutes for chart review  -      OT Stop Time  1337  -      OT Time Calculation (min)  32 min  -      OT Received On  06/18/21  -      OT Goal Re-Cert Due Date  06/28/21  -        User Key  (r) = Recorded By, (t) = Taken By, (c) = Cosigned By    Initials Name Provider Type    Africa Avila OTR/L Occupational Therapist        Therapy Charges for Today     Code Description Service Date Service Provider Modifiers Qty    03089039812 HC OT SELF CARE/MGMT/TRAIN EA 15 MIN 6/17/2021 Africa Hickey OTR/L GO 2    65972340423 HC OT RE-EVAL 2 6/18/2021 Africa Hickey OTR/L GO 1    [id number] HC OT SELF CARE/MGMT/TRAIN EA 15 MIN 6/18/2021 Africa Hickey OTR/L GO 1               TEQUILA Warren/YEIMI  6/18/2021

## 2021-06-18 NOTE — THERAPY RE-EVALUATION
Patient Name: Alin Richard  : 1975    MRN: [id number]                              Today's Date: 2021       Admit Date: 2021    Visit Dx:     ICD-10-CM ICD-9-CM   1. Impaired mobility  Z74.09 799.89   2. Decreased activities of daily living (ADL)  Z78.9 V49.89     Patient Active Problem List   Diagnosis   • Displacement of lumbar intervertebral disc without myelopathy   • BMI 38.0-38.9,adult   • Non-smoker   • Renal infarction (CMS/HCC)   • Essential hypertension   • Herniated intervertebral disc of lumbar spine   • Disease of thyroid gland   • PFO (patent foramen ovale)   • Degeneration of lumbar or lumbosacral intervertebral disc   • Lumbar radiculopathy   • Factor V Leiden (CMS/HCC)   • Migraine   • Lumbar stenosis   • Hypothyroidism (acquired)   • PTSD (post-traumatic stress disorder)   • Drug-induced constipation     Past Medical History:   Diagnosis Date   • Arthritis    • Depression    • Disease of thyroid gland    • Factor V Leiden (CMS/HCC)    • Hypertension    • Infarction of kidney (CMS/HCC)     BLOOD CLOT IN KIDNEY   • Injury of back    • Migraine    • Murmur, heart    • PTSD (post-traumatic stress disorder)      Past Surgical History:   Procedure Laterality Date   • BACK SURGERY     • LUMBAR DISCECTOMY Bilateral 2017    Procedure: LUMBAR Micro DISCECTOMY LEFT L4-5 and RIGHT L5-S1;  Surgeon: Favio Castillo MD;  Location: [facility];  Service:    • TONSILLECTOMY     • VASECTOMY       General Information     Row Name 21 4772          Physical Therapy Time and Intention    Document Type  re-evaluation s/p  PSF L5-S1.  -АЛЕКСАНДР     Mode of Treatment  physical therapy  -АЛЕКСАНДР     Row Name 21 1304          General Information    Patient Profile Reviewed  yes  -АЛЕКСАНДР     Existing Precautions/Restrictions  brace worn when out of bed;fall;spinal LSO  -АЛЕКСАНДР     Barriers to Rehab  physical barrier LLE weakness  -АЛЕКСАНДР     Row Name 21 1301          Cognition    Orientation Status  (Cognition)  oriented x 4  -АЛЕКСАНДР     Row Name 06/18/21 1306          Safety Issues, Functional Mobility    Impairments Affecting Function (Mobility)  balance;endurance/activity tolerance;strength;pain  -АЛЕКСАНДР       User Key  (r) = Recorded By, (t) = Taken By, (c) = Cosigned By    Initials Name Provider Type    Boyd Rush, PT DPT Physical Therapist        Mobility     Row Name 06/18/21 1306          Bed Mobility    Bed Mobility  sit-sidelying;sidelying-sit  -АЛЕКСАНДР     Sidelying-Sit [place] (Bed Mobility)  contact guard;minimum assist (75% patient effort);verbal cues  -АЛЕКСАНДР     Sit-Sidelying [place] (Bed Mobility)  contact guard;verbal cues;nonverbal cues (demo/gesture)  -АЛЕКСАНДР     Assistive Device (Bed Mobility)  bed rails;head of bed elevated  -АЛЕКСАНДР     Row Name 06/18/21 1306          Sit-Stand Transfer    Sit-Stand [place] (Transfers)  contact guard  -АЛЕКСАНДР     Row Name 06/18/21 1306          Gait/Stairs (Locomotion)    [place] Level (Gait)  contact guard  -АЛЕКСАНДР     Distance in Feet (Gait)  100  -АЛЕКСАНДР     Deviations/Abnormal Patterns (Gait)  gait speed decreased  -АЛЕКСАНДР     Comment (Gait/Stairs)  reports L LE weakness compared to RLE  -АЛЕКСАНДР       User Key  (r) = Recorded By, (t) = Taken By, (c) = Cosigned By    Initials Name Provider Type    Boyd Rush, PT DPT Physical Therapist        Obj/Interventions     Row Name 06/18/21 1306          Range of Motion Comprehensive    Comment, General Range of Motion  B LE AROM WFL, L hip flexion AROM impaired ~ 15%  -АЛЕКСАНДР     Row Name 06/18/21 1306          Strength Comprehensive (MMT)    Comment, General Manual Muscle Testing (MMT) Assessment  L LE grossly 4-/5, R LE grossly 4+/5  -АЛЕКСАНДР     Row Name 06/18/21 1306          Balance    Balance Assessment  sitting static balance;standing static balance  -АЛЕКСАНДР     Static Sitting Balance  WFL;unsupported;sitting, edge of bed  -АЛЕКСАНДР     Static Standing Balance  WFL;supported;standing  -АЛЕКСАНДР     Row Name 06/18/21 1306           Sensory Assessment (Somatosensory)    Sensory Assessment (Somatosensory)  LE sensation intact  -АЛЕКСАНДР       User Key  (r) = Recorded By, (t) = Taken By, (c) = Cosigned By    Initials Name Provider Type    Boyd Rush PT DPT Physical Therapist        Goals/Plan     Row Name 06/18/21 1306          Bed Mobility Goal 1 (PT)    Activity/Assistive Device (Bed Mobility Goal 1, PT)  bed mobility activities, all  -АЛЕКСАНДР     [place] Level/Cues Needed (Bed Mobility Goal 1, PT)  independent  -АЛЕКСАНДР     Time Frame (Bed Mobility Goal 1, PT)  long term goal (LTG);by discharge  -АЛЕКСАНДР     Progress/Outcomes (Bed Mobility Goal 1, PT)  good progress toward goal;goal ongoing  -АЛЕКСАНДР     Row Name 06/18/21 1306          Transfer Goal 1 (PT)    Activity/Assistive Device (Transfer Goal 1, PT)  sit-to-stand/stand-to-sit;bed-to-chair/chair-to-bed  -АЛЕКСАНДР     [place] Level/Cues Needed (Transfer Goal 1, PT)  independent  -АЛЕКСАНДР     Time Frame (Transfer Goal 1, PT)  long term goal (LTG);by discharge  -АЛЕКСАНДР     Progress/Outcome (Transfer Goal 1, PT)  good progress toward goal;goal ongoing  -АЛЕКСАНДР     Row Name 06/18/21 1306          Gait Training Goal 1 (PT)    Activity/Assistive Device (Gait Training Goal 1, PT)  gait (walking locomotion);decrease fall risk;increase endurance/gait distance;improve balance and speed  -АЛЕКСАНДР     [place] Level (Gait Training Goal 1, PT)  independent  -АЛЕКСАНДР     Distance (Gait Training Goal 1, PT)  150ft  -АЛЕКСАНДР     Time Frame (Gait Training Goal 1, PT)  long term goal (LTG);by discharge  -АЛЕКСАНДР     Progress/Outcome (Gait Training Goal 1, PT)  good progress toward goal;goal ongoing  -АЛЕКСАНДР       User Key  (r) = Recorded By, (t) = Taken By, (c) = Cosigned By    Initials Name Provider Type    Boyd Rush PT DPT Physical Therapist        Clinical Impression     Row Name 06/18/21 1306          Pain    Additional Documentation  Pain Scale: FACES Pre/Post-Treatment (Group)  -АЛЕКСАНДР     Row Name 06/18/21 1306          Pain Scale:  Numbers Pre/Post-Treatment    Pain Location - Orientation  incisional  -АЛЕКСАНДР     Pain Location  back  -АЛЕКСАНДР     Pain Intervention(s)  Repositioned;Ambulation/increased activity  -АЛЕКСАНДР     Row Name 06/18/21 1306          Pain Scale: FACES Pre/Post-Treatment    Pain: FACES Scale, Pretreatment  4-->hurts little more  -АЛЕКСАНДР     Row Name 06/18/21 1306          Plan of Care Review    Plan of Care Reviewed With  patient  -АЛЕКСАНДР     Progress  improving  -АЛЕКСАНДР     Outcome Summary  PT completed re-eval s/p back surgery today. He presents alert and oriented, spouse in room. They were again educated on back precautions and LSO use/adis/doff. He ambulated ~ 100 ft. He continues with L LE weakness when compared to R LE but was able to tolerate ambulation without a RW. PT will cont with gait and strengthening, Anticipate d.c home w spouse.  -АЛЕКСАНДР     Row Name 06/18/21 1306          Therapy Assessment/Plan (PT)    Patient/Family Therapy Goals Statement (PT)  go home  -АЛЕКСАНДР     Rehab Potential (PT)  good, to achieve stated therapy goals  -АЛЕКСАНДР     Criteria for Skilled Interventions Met (PT)  yes;meets criteria;skilled treatment is necessary  -АЛЕКСАНДР     Predicted Duration of Therapy Intervention (PT)  until d.c  -АЛЕКСАНДР     Row Name 06/18/21 1306          Positioning and Restraints    Pre-Treatment Position  in bed  -АЛЕКСАНДР     Post Treatment Position  bed  -АЛЕКСАНДР     In Bed  notified nsg;fowlers;call light within reach;encouraged to call for assist;with family/caregiver;SCD pump applied  -АЛЕКСАНДР       User Key  (r) = Recorded By, (t) = Taken By, (c) = Cosigned By    Initials Name Provider Type    Boyd Rush, PT DPT Physical Therapist        Outcome Measures     Row Name 06/18/21 1306          How much help from another person do you currently need...    Turning from your back to your side while in flat bed without using bedrails?  3  -АЛЕКСАНДР     Moving from lying on back to sitting on the side of a flat bed without bedrails?  3  -АЛЕКСАНДР     Moving to and from a bed  to a chair (including a wheelchair)?  3  -АЛЕКСАНДР     Standing up from a chair using your arms (e.g., wheelchair, bedside chair)?  3  -АЛЕКСАНДР     Climbing 3-5 steps with a railing?  3  -АЛЕКСАНДР     To walk in hospital room?  3  -АЛЕКСАНДР     AM-PAC 6 Clicks Score (PT)  18  -АЛЕКСАНДР     Row Name 06/18/21 1306 06/18/21 1305       Functional Assessment    Outcome Measure Options  AM-PAC 6 Clicks Basic Mobility (PT)  -АЛЕКСАНДР  AM-PAC 6 Clicks Daily Activity (OT)  -ELIA      User Key  (r) = Recorded By, (t) = Taken By, (c) = Cosigned By    Initials Name Provider Type    АЛЕКСАНДР Boyd Wheleer, PT DPT Physical Therapist    Africa Melendez, OTR/L Occupational Therapist        Physical Therapy Education                 Title: PT OT SLP Therapies (In Progress)     Topic: Physical Therapy (In Progress)     Point: Mobility training (Done)     Learning Progress Summary           Patient Acceptance, E, VU,DU,NR by АЛЕКСАНДР at 6/18/2021 1306    Comment: progression of PT POC, benefits of activity,back prec, LSO use/adis/doff    Acceptance, E, VU by MS at 6/16/2021 1432    Comment: role of PT in his care, spinal restrictions   Significant Other Acceptance, E, VU,DU,NR by АЛЕКСАНДР at 6/18/2021 1306    Comment: progression of PT POC, benefits of activity,back prec, LSO use/adis/doff                   Point: Home exercise program (Not Started)     Learner Progress:  Not documented in this visit.          Point: Body mechanics (Not Started)     Learner Progress:  Not documented in this visit.          Point: Precautions (Done)     Learning Progress Summary           Patient Acceptance, E, VU,DU,NR by АЛЕКСАНДР at 6/18/2021 1306    Comment: progression of PT POC, benefits of activity,back prec, LSO use/adis/doff    Acceptance, E,TB, VU by  at 6/17/2021 1006    Comment: LSO when OOB    Acceptance, E, VU by MS at 6/16/2021 1432    Comment: role of PT in his care, spinal restrictions   Significant Other Acceptance, E, VU,DU,NR by АЛЕКСАНДР at 6/18/2021 1306    Comment: progression of PT  POC, benefits of activity,back prec, LSO use/adis/doff                               User Key     Initials Effective Dates Name Provider Type Discipline     06/16/21 -  Sonia Hunt, PTA Physical Therapy Assistant PT    АЛЕКСАНДР 08/02/16 -  Boyd Wheeler, PT DPT Physical Therapist PT    MS 06/19/18 -  Mojgan Person, PT, DPT, NCS Physical Therapist PT              PT Recommendation and Plan  Planned Therapy Interventions (PT): bed mobility training, transfer training, gait training, balance training, home exercise program, patient/family education, postural re-education, stair training, strengthening, orthotic fitting/training  Plan of Care Reviewed With: patient  Progress: improving  Outcome Summary: PT completed re-eval s/p back surgery today. He presents alert and oriented, spouse in room. They were again educated on back precautions and LSO use/adis/doff. He ambulated ~ 100 ft. He continues with L LE weakness when compared to R LE but was able to tolerate ambulation without a RW. PT will cont with gait and strengthening, Anticipate d.c home w spouse.     Time Calculation:   PT Charges     Row Name 06/18/21 1514             Time Calculation    Start Time  1305  -АЛЕКСАНДР      Stop Time  1345  -АЛЕКСАНДР      Time Calculation (min)  40 min  -АЛЕКСАНДР      PT Received On  06/18/21  -АЛЕКСАНДР      PT Goal Re-Cert Due Date  06/28/21  -АЛЕКСАНДР         Time Calculation- PT    Total Timed Code Minutes- PT  10 minute(s)  -АЛЕКСАНДР         Timed Charges    67210 - Gait Training Minutes   10  -АЛЕКСАНДР         Total Minutes    Timed Charges Total Minutes  10  -АЛЕКСАНДР       Total Minutes  10  -АЛЕКСАНДР        User Key  (r) = Recorded By, (t) = Taken By, (c) = Cosigned By    Initials Name Provider Type    Boyd Rush, PT DPT Physical Therapist        Therapy Charges for Today     Code Description Service Date Service Provider Modifiers Qty    86026310276 HC GAIT TRAINING EA 15 MIN 6/18/2021 Boyd Wheeler, PT DPT GP 1    96914952535 HC PT RE-EVAL ESTABLISHED  PLAN 2 6/18/2021 Boyd Wheeler, PT DPT GP 1          PT G-Codes  Outcome Measure Options: AM-PAC 6 Clicks Basic Mobility (PT)  AM-PAC 6 Clicks Score (PT): 18  AM-PAC 6 Clicks Score (OT): 18    Boyd ASTUDILLO. Liam, PT DPT  6/18/2021

## 2021-06-18 NOTE — ANESTHESIA PROCEDURE NOTES
Airway  Urgency: elective    Date/Time: 6/18/2021 8:05 AM  Airway not difficult    General Information and Staff    Patient location during procedure: OR  CRNA: Efren Aguilar CRNA    Indications and Patient Condition  Indications for airway management: airway protection    Preoxygenated: yes  Mask difficulty assessment: 1 - vent by mask    Final Airway Details  Final airway type: endotracheal airway      Successful airway: ETT    Successful intubation technique: direct laryngoscopy  Endotracheal tube insertion site: oral  Blade: Ted  Blade size: 3.5 (3.5)  ETT size (mm): 7.5  Cormack-Lehane Classification: grade I - full view of glottis  Placement verified by: chest auscultation and capnometry   Measured from: lips  ETT/EBT  to lips (cm): 22  Number of attempts at approach: 1  Assessment: lips, teeth, and gum same as pre-op and atraumatic intubation

## 2021-06-18 NOTE — PLAN OF CARE
Problem: Adult Inpatient Plan of Care  Goal: Plan of Care Review  6/18/2021 0303 by Reagan Salmeron RN  Outcome: Ongoing, Progressing  Flowsheets (Taken 6/18/2021 0303)  Progress: no change  Plan of Care Reviewed With: patient  Outcome Summary: Pt is A&Ox. PRn pain meds given with good relief. Upx 1 with LSO brace. Wound vac in place, dressing is CDI. Denies any N/T. VSS. Voiding. Safety maintained.

## 2021-06-18 NOTE — OP NOTE
Posterior lumbar fusion with instrumentation procedure Note    Alinmonserrat Richard  6/18/2021    Pre-op Diagnosis:     1.  Factor V Leiden deficiency.   2.  Status post hemilaminotomy decompression right L4 to S1, Dr. Favio Castillo, 12/01/2017.   3.  Increasing chronic back pain.    4.  Residual bilateral buttock, thigh and leg radiculopathy, right worse than left.   5.  Chronic right gastrocsoleus complex weakness.    6.  Degenerative disc disease L4 to S1, severe L5-S1.    7.  Retrolisthesis L5-S1.    8.  Facet arthropathy L4 to S1, worse at L5-S1.   9.  Chronic recurrent disc herniation right L5-S1.   10.  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   11.  Obesity, BMI of 43.   12.  Status post anterior decompression, ALIF with instrumentation L5-S1, 6/16/2021    Post-op Diagnosis:    same    Procedure/CPT® Codes:    1.  Posterior spinal fusion L5-S1  2.  Posterior spinal instrumentation L5-S1 (ATEC pedicle screws and anshul)  3.  Use of locally obtained autograft bone for fusion  4.  Use of fluoroscopy for confirmation of surgical level and placement of instrumentation  5.  Intraoperative neural monitoring with pedicle screw stimulation    Anesthesia: General    Surgeon: RODGER Mirza MD    Assistant: Parmjit Stahl PA-C    Estimated Blood Loss: minimal    Complications: None    Condition: Stable to PACU.    Indications:    The patient is a 46-year-old who sees Dr. Lev Stallworth for medical issues.  He has a medical history significant for factor V Leiden deficiency.  He presented to the office with a history of a prior decompression on the right side from L4-S1 performed by Dr. Favio Castillo on 12/1/2017.  The procedure did help with some of his leg radiculopathy but unfortunately continued to have chronic back pain as well as a recurrence of symptoms down both lower extremities, with the right side worse than the left.  He also was noted to have chronic weakness in his right gastrocsoleus complex.   Imaging studies revealed degenerative disc disease and facet arthropathy from L4-S1 that was severe at L5-S1, where there was also retrolisthesis and a chronic recurrent disc herniation on the right causing central and foraminal stenosis that was severe on the right side.  While he did have degenerative changes and stenosis at L4-5, it was felt that this was minimally symptomatic.  It is possible he may require surgery at the L4-5 level at some point in the future.     After failing all conservative measures, it was mutually decided that surgery would be the best option. Risks, benefits, and complications of surgery were discussed in detail. The patient appeared well informed and wished to proceed. We specifically discussed the risk of infection, blood loss, nerve root injury, CSF leak, and the possibility of incomplete resolution of symptoms. We also discussed the possible risk of a nonunion and the potential need for additional surgery in the event of a pseudoarthrosis or hardware failure.      We elected proceed with a staged operation.  Previously on 6/16/2021, the patient underwent an anterior decompression and fusion with instrumentation of L5-S1.  Today we return to surgery for the second posterior stage of the procedure.     Operative Procedure:    After obtaining informed consent and verifying the correct operative site, the patient was brought to the operating room. A general anesthetic was provided by the anesthesia service with the assistance of an endotracheal tube. Once this was appropriately positioned and secured, the patient was carefully rotated prone onto a Oscar frame. All bony processes were well-padded. The lumbar region was prepped and draped in usual sterile fashion. A surgical timeout was taken to confirm this was the correct patient, we were working at the correct levels, and that preoperative antibiotics were given in a timely fashion.    Using fluoroscopy for guidance, a left sided  [place] incision was created overlying the L5-S1 segment using a 10 blade scalpel.  Dissection was carried through subcutaneous tissues using Bovie cautery.  The lumbar fascia was divided in line with the incision and blunt dissection was carried between the multifidus and the longissimus down to the facet joint of L5-S1.  Dissection was carried laterally exposing the transverse processes of L5 and the sacral ala.  We then exposed the L5-S1 facet joint.  The capsule was destroyed using Bovie cautery exposing as much bone as possible.  The high-speed bur was then used to decorticate the facet joint, destroying as much of the facet cartilage as possible.  I also decorticated the lateral pars region and the tranverse processes.  The locally obtained autograft bone was left in situ in the posterolateral gutter.  This constituted a posterior fusion of L5-S1.    Next under fluoroscopic guidance, Jamshidi needles were used to cannulate the pedicles of L5 and S1.  Once the needles were properly positioned in the pedicles, guidewires were placed to maintain position.  Over each of the guidewires, an [facility] pedicle screw was placed.  We used 6.5 mm x 45 mm screws into each of the pedicles.  I then used a neuro monitoring probe to stimulate the screws themselves confirming appropriate position ensuring no breach of the pedicle.  After confirming the screws were properly positioned, an appropriate-sized anshul was chosen to span the pedicle screws.  The anshul was tightened into position using set screws.  The setscrews were tightened using the appropriate antitorque wrench and torque limiting screwdriver provided by [facility].    The wound was then thoroughly irrigated and an inspection was then undertaken to ensure that we had adequate hemostasis.  Bleeding at this point was controlled using thrombin with Gelfoam powder and bipolar cautery.  Final fluoroscopy imaging confirmed adequate position of the newly placed posterior  instrumentation.    Wound closure was then accomplished by reapproximating the fascia with a #1 Vicryl area immediate subcutaneous tissues were closed using a 2-0 Vicryl and skin closure was augmented using Mastisol and Steri-Strips.  The incision was then washed and sterilely dressed with a Bioclusive dressing.    The patient was then carefully rotated supine onto a hospital gurney, extubated, and sent to the recovery room in good stable condition.  The patient tolerated the procedure well.  There were no complications.  We estimated blood loss to be minimal.    Parmjit Stahl PA-C provided critical assistance during the procedure.  His assistance was medically necessary in order to allow the procedure to occur in the most safe and efficient manner.    RODGER Mirza MD     Date: 6/18/2021  Time: 09:17 CDT

## 2021-06-18 NOTE — ANESTHESIA PREPROCEDURE EVALUATION
Anesthesia Evaluation     Patient summary reviewed   no history of anesthetic complications:  NPO Solid Status: > 8 hours  NPO Liquid Status: > 8 hours           Airway   Mallampati: I  TM distance: >3 FB  Neck ROM: full  No difficulty expected  Dental - normal exam     Pulmonary - normal exam   (+) a smoker Former, COPD mild,   Cardiovascular - normal exam  Exercise tolerance: good (4-7 METS)    (+) hypertension well controlled 2 medications or greater, valvular problems/murmurs murmur,       Neuro/Psych  (+) headaches, numbness, psychiatric history PTSD and Depression,     GI/Hepatic/Renal/Endo    (+) obesity, morbid obesity,      Musculoskeletal     Abdominal  - normal exam   Substance History - negative use     OB/GYN          Other   arthritis,                      Anesthesia Plan    ASA 2     general     intravenous induction     Anesthetic plan, all risks, benefits, and alternatives have been provided, discussed and informed consent has been obtained with: patient.

## 2021-06-18 NOTE — PLAN OF CARE
Goal Outcome Evaluation:  Plan of Care Reviewed With: patient        Progress: improving  Outcome Summary: PT completed re-eval s/p back surgery today. He presents alert and oriented, spouse in room. They were again educated on back precautions and LSO use/adis/doff. He ambulated ~ 100 ft. He continues with L LE weakness when compared to R LE but was able to tolerate ambulation without a RW. PT will cont with gait and strengthening, Anticipate d.c home w spouse.

## 2021-06-18 NOTE — PLAN OF CARE
Goal Outcome Evaluation:  Plan of Care Reviewed With: patient        Progress: no change  Outcome Summary: Sx today, A&Ox4. C/o pain, Prn pain medication given with good relief. Ice applied prn. Valentin CDI. Wound vac in place. . Room air. SCD. LSO when out of bed. PPP. PÉREZ. Call light within reach. Safety vaughn.

## 2021-06-18 NOTE — PROGRESS NOTES
Alin Richard is a 46 y.o. male patient.    Patient on way down for posterior aspect of surgery today.  N.p.o. for surgery.  Pain as expected.        Current Facility-Administered Medications   Medication Dose Route Frequency Provider Last Rate Last Admin   • acetaminophen (TYLENOL) tablet 1,000 mg  1,000 mg Oral Once Efren Aguilar CRNA       • [MAR Hold] acetaminophen (TYLENOL) tablet 650 mg  650 mg Oral Q6H PRN Abel Arnold PA   650 mg at 06/17/21 1634   • ceFAZolin in Sodium Chloride (ANCEF) IVPB solution 3 g  3 g Intravenous Once JULIUS Mirza MD       • [MAR Hold] cholecalciferol (VITAMIN D3) tablet 1,000 Units  1,000 Units Oral Nightly JULIUS Mirza MD   1,000 Units at 06/17/21 2106   • [MAR Hold] cyclobenzaprine (FLEXERIL) tablet 10 mg  10 mg Oral TID PRN JULIUS Mirza MD   10 mg at 06/16/21 2119   • [MAR Hold] diphenhydrAMINE (BENADRYL) capsule 25 mg  25 mg Oral Nightly PRN JULIUS Mirza MD       • famotidine (PEPCID) injection 20 mg  20 mg Intravenous Q12H JULIUS Mirza MD   20 mg at 06/16/21 1206    Or   • famotidine (PEPCID) tablet 20 mg  20 mg Oral Q12H JULIUS Mirza MD   20 mg at 06/17/21 2106   • [MAR Hold] HYDROmorphone (DILAUDID) injection 1 mg  1 mg Intravenous Q3H PRN JULIUS Mirza MD   1 mg at 06/17/21 1634   • lactated ringers infusion  100 mL/hr Intravenous Continuous JULIUS Mirza MD       • lactated ringers infusion  100 mL/hr Intravenous Continuous Efren Aguilar CRNA       • [MAR Hold] levothyroxine (SYNTHROID, LEVOTHROID) tablet 50 mcg  50 mcg Oral Q AM JULIUS Mirza MD   50 mcg at 06/17/21 0522   • lidocaine PF 1% (XYLOCAINE) injection 0.5 mL  0.5 mL Injection Once PRN JULIUS Mizra MD       • lidocaine PF 1% (XYLOCAINE) injection 0.5 mL  0.5 mL Injection Once PRN Efren Aguilar CRNA       • lisinopril (PRINIVIL,ZESTRIL) tablet 10 mg  10 mg Oral Daily JULIUS Mirza MD   10 mg at 06/17/21 0921   •  midazolam (VERSED) injection 1 mg  1 mg Intravenous Q10 Min PRN Efren Aguilar CRNA       • [MAR Hold] ondansetron (ZOFRAN) tablet 4 mg  4 mg Oral Q6H PRN JULIUS Mirza MD        Or   • [MAR Hold] ondansetron (ZOFRAN) injection 4 mg  4 mg Intravenous Q6H PRN JULIUS Mirza MD       • [MAR Hold] oxyCODONE ER (oxyCONTIN) 12 hr tablet 20 mg  20 mg Oral Q12H Parmjit Stahl PA-C   20 mg at 06/17/21 2015   • [MAR Hold] oxyCODONE-acetaminophen (PERCOCET)  MG per tablet 1 tablet  1 tablet Oral Q4H PRN JULIUS Mirza MD   1 tablet at 06/18/21 0643   • [MAR Hold] PARoxetine (PAXIL) tablet 20 mg  20 mg Oral QAM JULIUS Mirza MD   20 mg at 06/17/21 0921   • [MAR Hold] polyethylene glycol (MIRALAX) packet 17 g  17 g Oral BID Osman Wagner MD   17 g at 06/17/21 0921   • [MAR Hold] sodium chloride 0.9 % flush 10 mL  10 mL Intravenous PRN JULIUS Mirza MD       • [MAR Hold] sodium chloride 0.9 % flush 3 mL  3 mL Intravenous Q12H JULIUS Mirza MD   3 mL at 06/17/21 0922   • sodium chloride 0.9 % flush 3 mL  3 mL Intravenous Q12H Efren Aguilar CRNA       • sodium chloride 0.9 % flush 3-10 mL  3-10 mL Intravenous PRN Efren Aguilar CRNA       • sodium chloride 0.9 % infusion  75 mL/hr Intravenous Continuous JULIUS Mirza MD       • topiramate (TOPAMAX) tablet 50 mg  50 mg Oral Daily JULIUS Mirza MD   50 mg at 06/17/21 0921     ALLERGIES:    Allergies   Allergen Reactions   • Eggs Or Egg-Derived Products Nausea And Vomiting       Essential hypertension    PFO (patent foramen ovale)    Factor V Leiden (CMS/HCC)    Lumbar stenosis    Hypothyroidism (acquired)    PTSD (post-traumatic stress disorder)    Drug-induced constipation    Blood pressure 104/66, pulse 98, temperature 98.3 °F (36.8 °C), temperature source Oral, resp. rate 16, SpO2 99 %.      Subjective:  Symptoms:  Stable.    Diet:  NPO.    Activity level: Impaired due to pain.    Pain:  He complains of pain  that is moderate.  He reports pain is unchanged.  Pain is partially controlled.      Review of Systems  Objective:  General Appearance:  Comfortable and well-appearing.    Vital signs: (most recent): Blood pressure 104/66, pulse 98, temperature 98.3 °F (36.8 °C), temperature source Oral, resp. rate 16, SpO2 99 %.  Vital signs are normal.    Output: Producing urine and minimal stool output.    HEENT: Normal HEENT exam.    Lungs:  Normal effort and normal respiratory rate.    Heart: Normal rate.  Regular rhythm.    Abdomen: Abdomen is soft.  Bowel sounds are normal.     Neurological: Patient is alert.    Skin:  Warm and dry.              Labs:  Lab Results (last 72 hours)     Procedure Component Value Units Date/Time    Iron Profile [[id number]]  (Abnormal) Collected: 06/18/21 0518    Specimen: Blood Updated: 06/18/21 0638     Iron 21 mcg/dL      Iron Saturation 8 %      Transferrin 171 mg/dL      TIBC 255 mcg/dL     Vitamin B12 [[id number]] Collected: 06/18/21 0518    Specimen: Blood Updated: 06/18/21 0613    Folate [[id number]] Collected: 06/18/21 0518    Specimen: Blood Updated: 06/18/21 0613    COVID PRE-OP / PRE-PROCEDURE SCREENING ORDER (NO ISOLATION) - Swab, Nasal Cavity [[id number]]  (Normal) Collected: 06/18/21 0005    Specimen: Swab from Nasal Cavity Updated: 06/18/21 0112    Narrative:      The following orders were created for panel order COVID PRE-OP / PRE-PROCEDURE SCREENING ORDER (NO ISOLATION) - Swab, Nasal Cavity.  Procedure                               Abnormality         Status                     ---------                               -----------         ------                     COVID-19,[facility] IN-SHAWN...[[id number]]  Normal              Final result                 Please view results for these tests on the individual orders.    COVID-19,[facility] IN-HOUSE,Nasal Swab No Transport Media 3-4 HR TAT - Swab, Nasal Cavity [[id number]]  (Normal) Collected: 06/18/21 0005    Specimen: Swab from Nasal Cavity  Updated: 06/18/21 0112     COVID19 Not Detected    Narrative:      Fact sheet for providers: https://www.fda.gov/media/204548/download     Fact sheet for patients: https://www.fda.gov/media/803093/download    Test performed by PCR.    Consider negative results in combination with clinical observations, patient history, and epidemiological information.    Basic Metabolic Panel [[id number]]  (Abnormal) Collected: 06/17/21 0539    Specimen: Blood Updated: 06/17/21 0654     Glucose 121 mg/dL      BUN 9 mg/dL      Creatinine 0.91 mg/dL      Sodium 139 mmol/L      Potassium 3.8 mmol/L      Chloride 105 mmol/L      CO2 26.0 mmol/L      Calcium 8.7 mg/dL      eGFR Non African Amer 89 mL/min/1.73      BUN/Creatinine Ratio 9.9     Anion Gap 8.0 mmol/L     Narrative:      GFR Normal >60  Chronic Kidney Disease <60  Kidney Failure <15      CBC & Differential [[id number]]  (Abnormal) Collected: 06/17/21 0539    Specimen: Blood Updated: 06/17/21 0633    Narrative:      The following orders were created for panel order CBC & Differential.  Procedure                               Abnormality         Status                     ---------                               -----------         ------                     CBC Auto Differential[[id number]]        Abnormal            Final result                 Please view results for these tests on the individual orders.    CBC Auto Differential [[id number]]  (Abnormal) Collected: 06/17/21 0539    Specimen: Blood Updated: 06/17/21 0633     WBC 13.02 10*3/mm3      RBC 4.08 10*6/mm3      Hemoglobin 11.7 g/dL      Hematocrit 36.7 %      MCV 90.0 fL      MCH 28.7 pg      MCHC 31.9 g/dL      RDW 13.2 %      RDW-SD 43.1 fl      MPV 9.9 fL      Platelets 264 10*3/mm3      Neutrophil % 70.3 %      Lymphocyte % 18.9 %      Monocyte % 9.8 %      Eosinophil % 0.2 %      Basophil % 0.3 %      Immature Grans % 0.5 %      Neutrophils, Absolute 9.15 10*3/mm3      Lymphocytes, Absolute 2.46 10*3/mm3       Monocytes, Absolute 1.28 10*3/mm3      Eosinophils, Absolute 0.02 10*3/mm3      Basophils, Absolute 0.04 10*3/mm3      Immature Grans, Absolute 0.07 10*3/mm3      nRBC 0.0 /100 WBC           Imaging Results (Last 72 Hours)     Procedure Component Value Units Date/Time    XR Spine Lumbar AP & Lateral [[id number]] Collected: 06/16/21 1040     Updated: 06/18/21 0709    Narrative:      XR SPINE LUMBAR AP AND LATERAL- 6/16/2021 7:28 AM CDT     HISTORY: fusion     COMPARISON: None     FLUOROSCOPY TIME: 17 seconds     FLUOROSCOPY DOSE: 21 mGy     NUMBER OF IMAGES: 6       Impression:         Intraoperative fluoroscopic images during discectomy and anterior spinal  fusion.     Please refer to the operative note for more details.  This report was finalized on 06/16/2021 10:41 by Dr Fuentes Villatoro, .    FL C Arm During Surgery [[id number]] Collected: 06/16/21 1040     Updated: 06/18/21 0709    Narrative:      XR SPINE LUMBAR AP AND LATERAL- 6/16/2021 7:28 AM CDT     HISTORY: fusion     COMPARISON: None     FLUOROSCOPY TIME: 17 seconds     FLUOROSCOPY DOSE: 21 mGy     NUMBER OF IMAGES: 6       Impression:         Intraoperative fluoroscopic images during discectomy and anterior spinal  fusion.     Please refer to the operative note for more details.  This report was finalized on 06/16/2021 10:41 by Dr Fuentes Villatoro, .    XR Abdomen KUB [[id number]] Collected: 06/16/21 0931     Updated: 06/16/21 0935    Narrative:      XR ABDOMEN KUB- 6/16/2021 9:22 AM CDT     HISTORY: instrument count       COMPARISON: None     FINDINGS:     Intraoperative film obtained following L5-S1 level anterior fusion. No  retained surgical instrument is identified on this film. Bowel gas  pattern is unremarkable.        Impression:      1. Intraoperative no count film following anterior spinal fusion. No  retained surgical instrument identified.     Findings were discussed with OR room 5 on 6/16/2021 at 9:32 AM CDT.     This report was finalized on  06/16/2021 09:32 by Dr Fuentes Villatoro, .                Assessment:    Condition: In stable condition.  Improving.   (    Hypertension-review pre and post BP's,will restart home BP meds when BP allows. Recent studies demonstrate the need for patience and less reactivity for precipitous drop of BP in the acute care setting. Will educate staff to use other modalities to help reduce MAP prior to adding additional medical interventions. Add clonidine 0.1 mg every 4 hours prn if bp> 140/90,monitor BP and adjust meds as necessary.      Iron deficiency anemia present on admission-oral replacement therapy of iron sulfate 325 daily for 30 days.  Follow-up with primary care to make sure levels returned to normal.      Factor V deficiency-restart anticoagulation when cleared by spine surgery.  Can use Lovenox in the interim.    Constipation/obstipation-stool softeners of MiraLAX perioperatively after posterior aspect of surgery today can stimulate with either Dulcolax suppository are 1 bottle of magnesium citrate.    Hopefully discharge home over the weekend.    ).     Problem List:     Essential hypertension    PFO (patent foramen ovale)    Factor V Leiden (CMS/HCC)    Lumbar stenosis    Hypothyroidism (acquired)    PTSD (post-traumatic stress disorder)    Drug-induced constipation    Osman Wagner MD  6/18/2021

## 2021-06-18 NOTE — PLAN OF CARE
Goal Outcome Evaluation:  Plan of Care Reviewed With: patient        Progress: improving  Outcome Summary: OT re-eval completed s/p PSF L5-S1. He is alert and oriented x4 with spouse at bedside. He was re-educated on spinal precuations, LSO fitting/mgt and wear schedule. CGA/Min A for sidelying <> sit at EOB. Set-up and Sup for donning LSO seated at EOB. CGA for sit <> stand t/f from EOB and all functional mobility. Continue OT to increase his independence and safety with adls. Anticipate d/c home with spouse.

## 2021-06-18 NOTE — ANESTHESIA POSTPROCEDURE EVALUATION
Patient: Alin Richard    Procedure Summary     Date: 06/18/21 Room / Location:  [facility] OR  /  [facility]    Anesthesia Start: 0800 Anesthesia Stop: 0904    Procedure: POSTERIOR SPINAL FUSION WITH INSTRUMENTATION L5-S1 (N/A Spine Lumbar) Diagnosis: (M54.16)    Surgeons: JULIUS Mirza MD Provider: Efren Aguilar CRNA    Anesthesia Type: general ASA Status: 2          Anesthesia Type: general    Vitals  Vitals Value Taken Time   /88 06/18/21 1000   Temp 97.7 °F (36.5 °C) 06/18/21 1000   Pulse 103 06/18/21 1007   Resp 14 06/18/21 1000   SpO2 96 % 06/18/21 1007   Vitals shown include unvalidated device data.        Post Anesthesia Care and Evaluation    Patient location during evaluation: PACU  Patient participation: complete - patient participated  Level of consciousness: awake and alert  Pain management: adequate  Airway patency: patent  Anesthetic complications: No anesthetic complications    Cardiovascular status: acceptable  Respiratory status: acceptable  Hydration status: acceptable    Comments: Blood pressure 119/74, pulse 102, temperature 98.2 °F (36.8 °C), temperature source Oral, resp. rate 18, SpO2 94 %.    Pt discharged from PACU based on saida score >8  No anesthesia care post op

## 2021-06-19 VITALS
TEMPERATURE: 98.5 F | RESPIRATION RATE: 16 BRPM | OXYGEN SATURATION: 93 % | SYSTOLIC BLOOD PRESSURE: 130 MMHG | HEART RATE: 94 BPM | DIASTOLIC BLOOD PRESSURE: 68 MMHG

## 2021-06-19 PROCEDURE — 25010000002 CEFAZOLIN 1-4 GM/50ML-% SOLUTION: Performed by: PHYSICIAN ASSISTANT

## 2021-06-19 PROCEDURE — 97110 THERAPEUTIC EXERCISES: CPT

## 2021-06-19 PROCEDURE — 97116 GAIT TRAINING THERAPY: CPT

## 2021-06-19 RX ORDER — CYCLOBENZAPRINE HCL 10 MG
10 TABLET ORAL 3 TIMES DAILY PRN
Qty: 60 TABLET | Refills: 0 | Status: SHIPPED | OUTPATIENT
Start: 2021-06-19

## 2021-06-19 RX ORDER — OXYCODONE HCL 20 MG/1
20 TABLET, FILM COATED, EXTENDED RELEASE ORAL EVERY 12 HOURS SCHEDULED
Qty: 14 TABLET | Refills: 0 | Status: SHIPPED | OUTPATIENT
Start: 2021-06-19 | End: 2021-06-26

## 2021-06-19 RX ORDER — CYCLOBENZAPRINE HCL 10 MG
10 TABLET ORAL 3 TIMES DAILY PRN
Qty: 60 TABLET | Refills: 0 | Status: CANCELLED | OUTPATIENT
Start: 2021-06-19

## 2021-06-19 RX ADMIN — OXYCODONE AND ACETAMINOPHEN 1 TABLET: 325; 10 TABLET ORAL at 06:06

## 2021-06-19 RX ADMIN — PAROXETINE 20 MG: 20 TABLET, FILM COATED ORAL at 08:41

## 2021-06-19 RX ADMIN — TOPIRAMATE 50 MG: 25 TABLET, FILM COATED ORAL at 08:41

## 2021-06-19 RX ADMIN — SODIUM CHLORIDE, PRESERVATIVE FREE 3 ML: 5 INJECTION INTRAVENOUS at 08:41

## 2021-06-19 RX ADMIN — CEFAZOLIN SODIUM 1 G: 1 INJECTION, SOLUTION INTRAVENOUS at 08:41

## 2021-06-19 RX ADMIN — OXYCODONE AND ACETAMINOPHEN 1 TABLET: 325; 10 TABLET ORAL at 11:19

## 2021-06-19 RX ADMIN — OXYCODONE HYDROCHLORIDE 20 MG: 20 TABLET, FILM COATED, EXTENDED RELEASE ORAL at 08:41

## 2021-06-19 RX ADMIN — CYCLOBENZAPRINE 10 MG: 10 TABLET, FILM COATED ORAL at 11:19

## 2021-06-19 RX ADMIN — LISINOPRIL 10 MG: 10 TABLET ORAL at 08:41

## 2021-06-19 RX ADMIN — FERROUS SULFATE TAB 325 MG (65 MG ELEMENTAL FE) 325 MG: 325 (65 FE) TAB at 08:41

## 2021-06-19 RX ADMIN — LEVOTHYROXINE SODIUM 50 MCG: 50 TABLET ORAL at 06:04

## 2021-06-19 RX ADMIN — POLYETHYLENE GLYCOL (3350) 17 G: 17 POWDER, FOR SOLUTION ORAL at 08:41

## 2021-06-19 RX ADMIN — FAMOTIDINE 20 MG: 20 TABLET, FILM COATED ORAL at 08:41

## 2021-06-19 NOTE — PROGRESS NOTES
Family Medicine Progress Note    Patient:  Alin Rcihard  YOB: 1975    MRN: [id number]     Acct: [id number]     Admit date: 6/16/2021    Patient Seen, Chart, Consults notes, Labs, Radiology studies reviewed.    Subjective: Day 3 of stay with lumbar stenosis status post staged surgical decompression and most recent (in last 24 hours) has had no new issues or complaints.  Feeling well and ready to go home.  He is being discharged today by admitting surgical service.    Past, Family, Social History unchanged from admission.    Diet: Diet Regular    Medications:  Scheduled Meds:cholecalciferol, 1,000 Units, Oral, Nightly  famotidine, 20 mg, Intravenous, Q12H   Or  famotidine, 20 mg, Oral, Q12H  ferrous sulfate, 325 mg, Oral, Daily With Breakfast  levothyroxine, 50 mcg, Oral, Q AM  lisinopril, 10 mg, Oral, Daily  oxyCODONE, 20 mg, Oral, Q12H  PARoxetine, 20 mg, Oral, QAM  polyethylene glycol, 17 g, Oral, BID  sodium chloride, 3 mL, Intravenous, Q12H  topiramate, 50 mg, Oral, Daily      Continuous Infusions:lactated ringers, 100 mL/hr  lactated ringers, 100 mL/hr  sodium chloride, 75 mL/hr, Last Rate: 75 mL/hr (06/18/21 2245)      PRN Meds:•  acetaminophen  •  cyclobenzaprine  •  diphenhydrAMINE  •  HYDROmorphone  •  ondansetron **OR** ondansetron  •  oxyCODONE-acetaminophen  •  sodium chloride    Objective:    Lab Results (last 24 hours)     ** No results found for the last 24 hours. **           Imaging Results (Last 72 Hours)     Procedure Component Value Units Date/Time    XR Spine Lumbar AP & Lateral [[id number]] Collected: 06/18/21 1032     Updated: 06/18/21 1035    Narrative:      XR SPINE LUMBAR AP AND LATERAL- 6/18/2021 7:45 AM CDT     HISTORY: fusion; Z74.09-Other reduced mobility; Z78.9-Other specified  health status     COMPARISON: None     FLUOROSCOPY TIME: 22 seconds     FLUOROSCOPY DOSE: 49 mGy     NUMBER OF IMAGES: 2       Impression:         Intraoperative fluoroscopic images during  spinal fusion.     Please refer to the operative note for more details.  This report was finalized on 06/18/2021 10:32 by Dr Fuentes Villatoro, .    FL C Arm During Surgery [[id number]] Resulted: 06/18/21 0858     Updated: 06/18/21 1013    XR Spine Lumbar AP & Lateral [[id number]] Collected: 06/16/21 1040     Updated: 06/18/21 0709    Narrative:      XR SPINE LUMBAR AP AND LATERAL- 6/16/2021 7:28 AM CDT     HISTORY: fusion     COMPARISON: None     FLUOROSCOPY TIME: 17 seconds     FLUOROSCOPY DOSE: 21 mGy     NUMBER OF IMAGES: 6       Impression:         Intraoperative fluoroscopic images during discectomy and anterior spinal  fusion.     Please refer to the operative note for more details.  This report was finalized on 06/16/2021 10:41 by Dr Fuentes Villatoro, .    FL C Arm During Surgery [[id number]] Collected: 06/16/21 1040     Updated: 06/18/21 0709    Narrative:      XR SPINE LUMBAR AP AND LATERAL- 6/16/2021 7:28 AM CDT     HISTORY: fusion     COMPARISON: None     FLUOROSCOPY TIME: 17 seconds     FLUOROSCOPY DOSE: 21 mGy     NUMBER OF IMAGES: 6       Impression:         Intraoperative fluoroscopic images during discectomy and anterior spinal  fusion.     Please refer to the operative note for more details.  This report was finalized on 06/16/2021 10:41 by Dr Fuentes Villatoro, .           Physical Exam:    Vitals: /68 (BP Location: Right arm, Patient Position: Lying)   Pulse 94   Temp 98.5 °F (36.9 °C) (Oral)   Resp 16   SpO2 93%   24 hour intake/output:    Intake/Output Summary (Last 24 hours) at 6/19/2021 1140  Last data filed at 6/19/2021 0920  Gross per 24 hour   Intake 240 ml   Output 1100 ml   Net -860 ml     Last 3 weights:  Wt Readings from Last 3 Encounters:   06/08/21 (!) 153 kg (337 lb 15.4 oz)   06/08/21 (!) 152 kg (336 lb)   05/09/19 (!) 144 kg (317 lb)       General Appearance alert, appears stated age and cooperative  Head normocephalic, without obvious abnormality and atraumatic  Eyes lids and lashes  normal, conjunctivae and sclerae normal and no icterus  Neck no adenopathy, supple and trachea midline  Lungs clear to auscultation, respirations regular, respirations even and respirations unlabored  Heart regular rhythm & normal rate and normal S1, S2  Abdomen normal bowel sounds, no masses, soft non-tender and no guarding  Extremities no edema, no cyanosis and no redness  Skin turgor normal  Neurologic Mental Status orientated to person, place, time and situation        Assessment:      Essential hypertension    PFO (patent foramen ovale)    Factor V Leiden (CMS/HCC)    Lumbar stenosis    Hypothyroidism (acquired)    PTSD (post-traumatic stress disorder)    Drug-induced constipation          Plan:  Agree with discharge home today.  Medically stable.  I did ask questions about his home anticoagulant.  He is instructed to restart that as soon as possible at home given his history of factor V Leiden deficiency and prior clot.  He is followed by Dr. Lev Stallworth as an outpatient.  Instructed the patient to call Dr. Stallworth on Monday to arrange for outpatient follow-up.    Explained to patient and staff that we were consulted for medical management during their acute care hospitalization. The Medical Consultation was requested by the Attending Physician. The patient has been recommended to f/u with their regular primary care provider concerning any further treatment and review of abnormalities found during their hospitalization at [facility].They agree with the treatment plan as well as understand our role in their hospitalization. All questions were encouraged and answered to best of my ability.      Electronically signed by Mark Hansen MD on 6/19/2021 at 11:40 CDT

## 2021-06-19 NOTE — THERAPY TREATMENT NOTE
Acute Care - Physical Therapy Treatment Note  [facility]     Patient Name: Alin Richard  : 1975  MRN: [id number]  Today's Date: 2021           PT Assessment (last 12 hours)      PT Evaluation and Treatment     Row Name 21 1354 21 0930       Physical Therapy Time and Intention    Subjective Information  complains of;pain  -BECKY  complains of;pain  -BECKY    Document Type  therapy note (daily note)  -BECKY  therapy note (daily note)  -BECKY    Mode of Treatment  physical therapy  -  physical therapy  -BECKY    Row Name 21 1354 21 0930       General Information    Existing Precautions/Restrictions  brace worn when out of bed;fall;spinal  -BECKY  brace worn when out of bed;fall;spinal prevena   -BECKY    Row Name 21 1354 21 0930       Pain Scale: Numbers Pre/Post-Treatment    Pretreatment Pain Rating  5/10  -BECKY  4/10  -BECKY    Posttreatment Pain Rating  5/10  -BECKY  4/10  -BECKY    Pain Location - Side  Left  -BECKY  Left  -BECKY    Pain Location - Orientation  incisional  -BECKY  incisional  -BECKY    Pain Location  back  -BECKY  back  -BECKY    Row Name 21 1354 21 0930       Bed Mobility    Supine-Sit [place] (Bed Mobility)  --  standby assist  -BECKY    Sit-Supine [place] (Bed Mobility)  --  standby assist  -BECKY    Sidelying-Sit [place] (Bed Mobility)  standby assist  -BECKY  --    Sit-Sidelying [place] (Bed Mobility)  standby assist  -BECKY  --    Comment (Bed Mobility)  performed bed mobility with HOB flat and no bed rail   -BECKY  performed sit to supine with HOB flat   -BECKY    Row Name 21 1354 21 0930       Transfers    Sit-Stand [place] (Transfers)  standby assist  -BECKY  verbal cues;contact guard  -BECKY    Row Name 21 0930          Sit-Stand Transfer    Assistive Device (Sit-Stand Transfers)  walker, front-wheeled  -BECKY     Row Name 21 1354 21 0930       Gait/Stairs (Locomotion)    [place] Level (Gait)  contact guard  -BECKY  verbal cues;contact guard   -BECKY    Assistive Device (Gait)  -- no AD   -BECKY  walker, front-wheeled  -BECKY    Distance in Feet (Gait)  160  -BECKY  120', last 20' without AD   -BECKY    Deviations/Abnormal Patterns (Gait)  gait speed decreased  -BECKY  gait speed decreased  -BECKY    Row Name             Wound 06/16/21 0743 abdomen Incision    Wound - Properties Group Placement Date: 06/16/21  -KM Placement Time: 0743  -KM Present on Hospital Admission: N  -KM Location: abdomen  -KM Primary Wound Type: Incision  -KM    Retired Wound - Properties Group Date first assessed: 06/16/21  -KM Time first assessed: 0743  -KM Present on Hospital Admission: N  -KM Location: abdomen  -KM Primary Wound Type: Incision  -KM    Row Name             Wound 06/18/21 0825 lower lumbar spine    Wound - Properties Group Placement Date: 06/18/21  -CF Placement Time: 0825  -CF Present on Hospital Admission: N  -CF Orientation: lower  -CF Location: lumbar spine  -CF Additional Comments: closed incision. dressings applied.  -CF    Retired Wound - Properties Group Date first assessed: 06/18/21  -CF Time first assessed: 0825  -CF Present on Hospital Admission: N  -CF Location: lumbar spine  -CF Additional Comments: closed incision. dressings applied.  -CF    Row Name 06/19/21 1354 06/19/21 0930       Positioning and Restraints    Pre-Treatment Position  in bed  -BECKY  in bed  -BECKY    Post Treatment Position  bed  -BECKY  bed  -BECKY    In Bed  sitting EOB;call light within reach;encouraged to call for assist  -BECKY  fowlers;call light within reach;encouraged to call for assist;with family/caregiver;side rails up x2  -BECKY      User Key  (r) = Recorded By, (t) = Taken By, (c) = Cosigned By    Initials Name Provider Type    BECKY Jona Hammonds, PTA Physical Therapy Assistant    Ml Blas, RN Registered Nurse    Jeni Ortega RN Registered Nurse        Physical Therapy Education                 Title: PT OT SLP Therapies (In Progress)     Topic: Physical Therapy (In Progress)     Point:  Mobility training (Done)     Learning Progress Summary           Patient Acceptance, E, VU by BECKY at 6/19/2021 0930    Comment: progression with amb, bed mobility with bed flat and no bed rail    Acceptance, E, VU,DU,NR by АЛЕКСАНДР at 6/18/2021 1306    Comment: progression of PT POC, benefits of activity,back prec, LSO use/adis/doff    Acceptance, E, VU by MS at 6/16/2021 1432    Comment: role of PT in his care, spinal restrictions   Significant Other Acceptance, E, VU,DU,NR by АЛЕКСАНДР at 6/18/2021 1306    Comment: progression of PT POC, benefits of activity,back prec, LSO use/adis/doff                   Point: Home exercise program (Not Started)     Learner Progress:  Not documented in this visit.          Point: Body mechanics (Not Started)     Learner Progress:  Not documented in this visit.          Point: Precautions (Done)     Learning Progress Summary           Patient Acceptance, E, VU,DU,NR by АЛЕКСАНДР at 6/18/2021 1306    Comment: progression of PT POC, benefits of activity,back prec, LSO use/adis/doff    Acceptance, E,TB, VU by  at 6/17/2021 1006    Comment: LSO when OOB    Acceptance, E, VU by MS at 6/16/2021 1432    Comment: role of PT in his care, spinal restrictions   Significant Other Acceptance, E, VU,DU,NR by АЛЕКСАНДР at 6/18/2021 1306    Comment: progression of PT POC, benefits of activity,back prec, LSO use/adis/doff                               User Key     Initials Effective Dates Name Provider Type Discipline     06/16/21 -  Sonia Hunt, PTA Physical Therapy Assistant PT    АЛЕКСАНДР 08/02/16 -  Boyd Wheeler, PT DPT Physical Therapist PT    MS 06/19/18 -  Mojgan Person, PT, DPT, NCS Physical Therapist PT    BECKY 12/08/16 -  Jona Hammonds, PTA Physical Therapy Assistant PT              PT Recommendation and Plan     Plan of Care Reviewed With: patient  Progress: improving  Outcome Summary: Pt performed bed mobility with SBA.  Transfered sit to stand with CGA, cues for UE placement.  Amb 120' with RWX and  CGA  Outcome Measures     Row Name 06/19/21 0852             How much help from another is currently needed...    Putting on and taking off regular lower body clothing?  2  -CJ      Bathing (including washing, rinsing, and drying)  2  -CJ      Toileting (which includes using toilet bed pan or urinal)  3  -CJ      Putting on and taking off regular upper body clothing  3  -CJ      Taking care of personal grooming (such as brushing teeth)  4  -CJ      Eating meals  4  -CJ      AM-PAC 6 Clicks Score (OT)  18  -         Functional Assessment    Outcome Measure Options  AM-PAC 6 Clicks Daily Activity (OT)  -        User Key  (r) = Recorded By, (t) = Taken By, (c) = Cosigned By    Initials Name Provider Type    Torito De COTA/L Occupational Therapy Assistant           Time Calculation:   PT Charges     Row Name 06/19/21 1354 06/19/21 0930          Time Calculation    Start Time  1354  -BECKY  0930  -BECKY     Stop Time  1412  -BECKY  0954  -BECKY     Time Calculation (min)  18 min  -BECKY  24 min  -BECKY     PT Received On  06/19/21  -BECKY  06/19/21  -BECKY        Time Calculation- PT    Total Timed Code Minutes- PT  18 minute(s)  -BECKY  24 minute(s)  -BECKY        Timed Charges    06081 - Gait Training Minutes   18  -BECKY  24  -BECKY        Total Minutes    Timed Charges Total Minutes  18  -BECKY  24  -BECKY      Total Minutes  18  -BECKY  24  -BECKY       User Key  (r) = Recorded By, (t) = Taken By, (c) = Cosigned By    Initials Name Provider Type    Jona Finnegan PTA Physical Therapy Assistant        Therapy Charges for Today     Code Description Service Date Service Provider Modifiers Qty    61117014371 HC GAIT TRAINING EA 15 MIN 6/19/2021 Jona Hammonds PTA GP 2    10360964351 HC GAIT TRAINING EA 15 MIN 6/19/2021 Jona Hammonds PTA GP 1          PT G-Codes  Outcome Measure Options: AM-PAC 6 Clicks Daily Activity (OT)  AM-PAC 6 Clicks Score (PT): 18  AM-PAC 6 Clicks Score (OT): 18    Jona Hammonds PTA  6/19/2021

## 2021-06-19 NOTE — DISCHARGE SUMMARY
"  Date of Discharge:  6/19/2021    Admission Diagnosis: M54.16    Discharge Diagnosis:   1.  Factor V Leiden deficiency.   2.  Status post hemilaminotomy decompression right L4 to S1, Dr. Favio Castillo, 12/01/2017.   3.  Increasing chronic back pain.    4.  Residual bilateral buttock, thigh and leg radiculopathy, right worse than left.   5.  Chronic right gastrocsoleus complex weakness.    6.  Degenerative disc disease L4 to S1, severe L5-S1.    7.  Retrolisthesis L5-S1.    8.  Facet arthropathy L4 to S1, worse at L5-S1.   9.  Chronic recurrent disc herniation right L5-S1.   10.  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   11.  Obesity, BMI of 43.   12.  Status post anterior decompression, ALIF with instrumentation L5-S1, 6/16/2021  13.  Status post PSF with instrumentation L5-S1, 6/18/2021    Consults During Admission: Dr. Wagner    Hospital Course  Patient is a 46 y.o. male Known to our practice. Admitted for the above staged lumbar fusion.  This has been well tolerated and the patient will be discharged home today in good stable condition with instructions for brace when out of bed.  No driving until directed.  Patient will follow-up with Dr. Mirza's clinic in two weeks. They will call if problems arise.         Condition on Discharge:  STABLE    Vital Signs  Temp:  [98.1 °F (36.7 °C)-100.1 °F (37.8 °C)] 98.5 °F (36.9 °C)  Heart Rate:  [] 94  Resp:  [16-18] 16  BP: (109-130)/(57-75) 130/68    Physical Exam:   Alert and oriented ×3, no acute distress, grossly neurovascularly intact, vital signs stable, dressing clean dry and intact, moving all extremities without focal deficit      Discharge Disposition      Discharge Medications     Discharge Medications      Continue These Medications      Instructions Start Date   B-D 3CC LUER-SRIDHAR SYR 84CW6-7/2 22G X 1-1/2\" 3 ML misc  Generic drug: Syringe/Needle (Disp)   USE AS DIRECTED         ASK your doctor about these medications      " Instructions Start Date   cholecalciferol 25 MCG (1000 UT) tablet  Commonly known as: VITAMIN D3   1,000 Units, Oral, Daily      cyclobenzaprine 10 MG tablet  Commonly known as: FLEXERIL   10 mg, Oral, Nightly      levothyroxine 50 MCG tablet  Commonly known as: SYNTHROID, LEVOTHROID   TAKE 1 TABLET BY MOUTH DAILY      lisinopril 10 MG tablet  Commonly known as: PRINIVIL,ZESTRIL   TAKE 1 TABLET EVERY DAY      oxyCODONE-acetaminophen  MG per tablet  Commonly known as: PERCOCET   1 tablet, Oral, Every 4 Hours PRN      PARoxetine 20 MG tablet  Commonly known as: PAXIL   20 mg, Oral, Every Morning      rivaroxaban 20 MG tablet  Commonly known as: XARELTO   20 mg, Oral, Daily      Testosterone Cypionate 200 MG/ML injection  Commonly known as: DEPOTESTOTERONE CYPIONATE   200 mg, Intramuscular, Every 14 Days      topiramate 50 MG tablet  Commonly known as: TOPAMAX   50 mg, Oral, Daily             Discharge Diet: Resume Home diet, advance as tolerated    Activity at Discharge: Resume home activity, advance as tolerated, no lifting, no twisting, no bending, brace as directed, no driving until directed.     Follow-up Appointments  Followup with PCP within one week  Followup [facility] at 2 weeks post-op         Parmjit Stahl PA-C  06/19/21  12:16 CDT

## 2021-06-19 NOTE — PLAN OF CARE
Problem: Adult Inpatient Plan of Care  Goal: Plan of Care Review  Outcome: Ongoing, Progressing  Flowsheets (Taken 6/19/2021 1166)  Progress: improving  Plan of Care Reviewed With:   patient   spouse  Outcome Summary: Pt. completed ue exs to increase his ability with bed mobility and transfers!   Goal Outcome Evaluation:  Plan of Care Reviewed With: patient, spouse        Progress: improving  Outcome Summary: Pt. completed ue exs to increase his ability with bed mobility and transfers!

## 2021-06-19 NOTE — THERAPY TREATMENT NOTE
Acute Care - Physical Therapy Treatment Note  [facility]     Patient Name: Alin Richard  : 1975  MRN: [id number]  Today's Date: 2021           PT Assessment (last 12 hours)      PT Evaluation and Treatment     Row Name 21          Physical Therapy Time and Intention    Subjective Information  complains of;pain  -BECKY     Document Type  therapy note (daily note)  -BECKY     Mode of Treatment  physical therapy  -BECKY     Row Name 21          General Information    Existing Precautions/Restrictions  brace worn when out of bed;fall;spinal prevena   -BECKY     Row Name 21          Pain Scale: Numbers Pre/Post-Treatment    Pretreatment Pain Rating  4/10  -BECKY     Posttreatment Pain Rating  4/10  -BECKY     Pain Location - Side  Left  -BECKY     Pain Location - Orientation  incisional  -BECKY     Pain Location  back  -BECKY     Row Name 21          Bed Mobility    Supine-Sit [place] (Bed Mobility)  standby assist  -BECKY     Sit-Supine [place] (Bed Mobility)  standby assist  -BECKY     Comment (Bed Mobility)  performed sit to supine with HOB flat   -BECKY     Row Name 21          Transfers    Sit-Stand [place] (Transfers)  verbal cues;contact guard  -BECKY     Row Name 21          Sit-Stand Transfer    Assistive Device (Sit-Stand Transfers)  walker, front-wheeled  -BECKY     Row Name 21          Gait/Stairs (Locomotion)    [place] Level (Gait)  verbal cues;contact guard  -BECKY     Assistive Device (Gait)  walker, front-wheeled  -BECKY     Distance in Feet (Gait)  120', last 20' without AD   -BECKY     Deviations/Abnormal Patterns (Gait)  gait speed decreased  -BECKY     Row Name             Wound 21 abdomen Incision    Wound - Properties Group Placement Date: 21  -KM Placement Time: 43  -KM Present on Hospital Admission: N  -KM Location: abdomen  -KM Primary Wound Type: Incision  -KM    Retired Wound - Properties Group Date first assessed:  06/16/21  -KM Time first assessed: 0743  -KM Present on Hospital Admission: N  -KM Location: abdomen  -KM Primary Wound Type: Incision  -KM    Row Name             Wound 06/18/21 0825 lower lumbar spine    Wound - Properties Group Placement Date: 06/18/21  -CF Placement Time: 0825  -CF Present on Hospital Admission: N  -CF Orientation: lower  -CF Location: lumbar spine  -CF Additional Comments: closed incision. dressings applied.  -CF    Retired Wound - Properties Group Date first assessed: 06/18/21  -CF Time first assessed: 0825  -CF Present on Hospital Admission: N  -CF Location: lumbar spine  -CF Additional Comments: closed incision. dressings applied.  -CF    Row Name 06/19/21 0930          Positioning and Restraints    Pre-Treatment Position  in bed  -BECKY     Post Treatment Position  bed  -BECKY     In Bed  fowlers;call light within reach;encouraged to call for assist;with family/caregiver;side rails up x2  -BECKY       User Key  (r) = Recorded By, (t) = Taken By, (c) = Cosigned By    Initials Name Provider Type    Jona Finnegan, PTA Physical Therapy Assistant    KM Ml Morley, RN Registered Nurse    Jeni Ortega, RN Registered Nurse        Physical Therapy Education                 Title: PT OT SLP Therapies (In Progress)     Topic: Physical Therapy (In Progress)     Point: Mobility training (Done)     Learning Progress Summary           Patient Acceptance, E, VU by BECKY at 6/19/2021 0930    Comment: progression with amb, bed mobility with bed flat and no bed rail    Acceptance, E, VU,DU,NR by АЛЕКСАНДР at 6/18/2021 1306    Comment: progression of PT POC, benefits of activity,back prec, LSO use/adis/doff    Acceptance, E, VU by MS at 6/16/2021 1432    Comment: role of PT in his care, spinal restrictions   Significant Other Acceptance, E, VU,DU,NR by АЛЕКСАНДР at 6/18/2021 1306    Comment: progression of PT POC, benefits of activity,back prec, LSO use/adis/doff                   Point: Home exercise program (Not  Started)     Learner Progress:  Not documented in this visit.          Point: Body mechanics (Not Started)     Learner Progress:  Not documented in this visit.          Point: Precautions (Done)     Learning Progress Summary           Patient Acceptance, E, VU,DU,NR by АЛЕКСАНДР at 6/18/2021 1306    Comment: progression of PT POC, benefits of activity,back prec, LSO use/adis/doff    Acceptance, E,TB, VU by  at 6/17/2021 1006    Comment: LSO when OOB    Acceptance, E, VU by MS at 6/16/2021 1432    Comment: role of PT in his care, spinal restrictions   Significant Other Acceptance, E, VU,DU,NR by АЛЕКСАНДР at 6/18/2021 1306    Comment: progression of PT POC, benefits of activity,back prec, LSO use/adis/doff                               User Key     Initials Effective Dates Name Provider Type Discipline     06/16/21 -  Sonia Hunt, PTA Physical Therapy Assistant PT     08/02/16 -  Boyd Wheeler, PT DPT Physical Therapist PT    MS 06/19/18 -  Mojgan Person, PT, DPT, NCS Physical Therapist PT     12/08/16 -  Jona Hammonds PTA Physical Therapy Assistant PT              PT Recommendation and Plan     Plan of Care Reviewed With: patient  Progress: improving  Outcome Summary: Pt performed bed mobility with SBA.  Transfered sit to stand with CGA, cues for UE placement.  Amb 120' with RWX and CGA       Time Calculation:   PT Charges     Row Name 06/19/21 0930             Time Calculation    Start Time  0930  -BECKY      Stop Time  0954  -BECKY      Time Calculation (min)  24 min  -BECKY      PT Received On  06/19/21  -BECKY         Time Calculation- PT    Total Timed Code Minutes- PT  24 minute(s)  -BECKY         Timed Charges    42533 - Gait Training Minutes   24  -BECKY         Total Minutes    Timed Charges Total Minutes  24  -BECKY       Total Minutes  24  -BECKY        User Key  (r) = Recorded By, (t) = Taken By, (c) = Cosigned By    Initials Name Provider Type    Jona Finnegan, NAVIN Physical Therapy Assistant        Therapy  Charges for Today     Code Description Service Date Service Provider Modifiers Qty    [id number] HC GAIT TRAINING EA 15 MIN 6/19/2021 Jona Hammonds, PTA GP 2          PT G-Codes  Outcome Measure Options: AM-PAC 6 Clicks Basic Mobility (PT)  AM-PAC 6 Clicks Score (PT): (P) 18  AM-PAC 6 Clicks Score (OT): 18    Jona Hammonds PTA  6/19/2021

## 2021-06-19 NOTE — PLAN OF CARE
Goal Outcome Evaluation:  Plan of Care Reviewed With: patient        Progress: improving  Outcome Summary: Pt discharged to home with wife.

## 2021-06-19 NOTE — THERAPY TREATMENT NOTE
Acute Care - Occupational Therapy Treatment Note  [facility]     Patient Name: Alin Richard  : 1975  MRN: [id number]  Today's Date: 2021             Admit Date: 2021       ICD-10-CM ICD-9-CM   1. Impaired mobility  Z74.09 799.89   2. Decreased activities of daily living (ADL)  Z78.9 V49.89     Patient Active Problem List   Diagnosis   • Displacement of lumbar intervertebral disc without myelopathy   • BMI 38.0-38.9,adult   • Non-smoker   • Renal infarction (CMS/HCC)   • Essential hypertension   • Herniated intervertebral disc of lumbar spine   • Disease of thyroid gland   • PFO (patent foramen ovale)   • Degeneration of lumbar or lumbosacral intervertebral disc   • Lumbar radiculopathy   • Factor V Leiden (CMS/HCC)   • Migraine   • Lumbar stenosis   • Hypothyroidism (acquired)   • PTSD (post-traumatic stress disorder)   • Drug-induced constipation     Past Medical History:   Diagnosis Date   • Arthritis    • Depression    • Disease of thyroid gland    • Factor V Leiden (CMS/HCC)    • Hypertension    • Infarction of kidney (CMS/HCC)     BLOOD CLOT IN KIDNEY   • Injury of back    • Migraine    • Murmur, heart    • PTSD (post-traumatic stress disorder)      Past Surgical History:   Procedure Laterality Date   • BACK SURGERY     • LUMBAR DISCECTOMY Bilateral 2017    Procedure: LUMBAR Micro DISCECTOMY LEFT L4-5 and RIGHT L5-S1;  Surgeon: Favio Castillo MD;  Location: [facility];  Service:    • LUMBAR LAMINECTOMY WITH FUSION N/A 2021    Procedure: POSTERIOR SPINAL FUSION WITH INSTRUMENTATION L5-S1;  Surgeon: JULIUS Mirza MD;  Location: [facility];  Service: Orthopedic Spine;  Laterality: N/A;   • TONSILLECTOMY     • VASECTOMY              OT ASSESSMENT FLOWSHEET (last 12 hours)      OT Evaluation and Treatment     Row Name 21 0852                   OT Time and Intention    Subjective Information  complains of;weakness;fatigue;pain  -CJ        Document Type  therapy note (daily  note)  -        Mode of Treatment  occupational therapy  -        Patient Effort  good  -           General Information    Existing Precautions/Restrictions  fall;brace worn when out of bed;spinal pain!  -           Pain Assessment    Additional Documentation  Pain Scale: Word Pre/Post-Treatment (Group)  -           Pain Scale: Numbers Pre/Post-Treatment    Pretreatment Pain Rating  3/10  -        Posttreatment Pain Rating  3/10  -        Pain Location - Side  Left  -        Pain Location - Orientation  incisional  -        Pain Location  back  -        Pain Intervention(s)  Rest  -           Bed Mobility    Comment (Bed Mobility)  fowlers in bed!  -           Motor Skills    Motor Skills  therapeutic exercise  -        Therapeutic Exercise  shoulder;elbow/forearm  -           Shoulder (Therapeutic Exercise)    Shoulder (Therapeutic Exercise)  AROM (active range of motion);strengthening exercise  -        Shoulder AROM (Therapeutic Exercise)  bilateral;flexion;extension;sitting;10 repetitions  -        Shoulder Strengthening (Therapeutic Exercise)  bilateral;flexion;extension;sitting;2 lb free weight;3 lb free weight  -           Elbow/Forearm (Therapeutic Exercise)    Elbow/Forearm (Therapeutic Exercise)  AROM (active range of motion);strengthening exercise  -        Elbow/Forearm AROM (Therapeutic Exercise)  bilateral;flexion;extension;sitting;10 repetitions;3 sets  -        Elbow/Forearm Strengthening (Therapeutic Exercise)  bilateral;flexion;extension;sitting;2 lb free weight;3 lb free weight  -           Wound 06/16/21 0743 abdomen Incision    Wound - Properties Group Placement Date: 06/16/21  -KM Placement Time: 0743  -KM Present on Hospital Admission: N  -KM Location: abdomen  -KM Primary Wound Type: Incision  -KM    Retired Wound - Properties Group Date first assessed: 06/16/21  -KM Time first assessed: 0743  -KM Present on Hospital Admission: N  -KM Location: abdomen   -KM Primary Wound Type: Incision  -KM       Wound 06/18/21 0825 lower lumbar spine    Wound - Properties Group Placement Date: 06/18/21  -CF Placement Time: 0825  -CF Present on Hospital Admission: N  -CF Orientation: lower  -CF Location: lumbar spine  -CF Additional Comments: closed incision. dressings applied.  -CF    Retired Wound - Properties Group Date first assessed: 06/18/21  -CF Time first assessed: 0825  -CF Present on Hospital Admission: N  -CF Location: lumbar spine  -CF Additional Comments: closed incision. dressings applied.  -CF       Positioning and Restraints    Pre-Treatment Position  in bed  -CJ        Post Treatment Position  bed  -CJ        In Bed  fowlers;call light within reach;encouraged to call for assist;with family/caregiver;side rails up x2  -CJ           Progress Summary (OT)    Progress Toward Functional Goals (OT)  progress toward functional goals is good  -        Barriers to Overall Progress (OT)  Fall/pain/brace on when oob!  -CJ        Impairments Still Limiting Function (OT)  contnue with ot poc!  -          User Key  (r) = Recorded By, (t) = Taken By, (c) = Cosigned By    Initials Name Effective Dates    CJ Torito Valdes, SHILOH/YEIMI 06/16/21 -     KM Ml Morley, RN 01/13/20 - 06/15/21    CF Jeni Rader, SHANDRA 10/15/19 - 06/15/21         Occupational Therapy Education                 Title: PT OT SLP Therapies (In Progress)     Topic: Occupational Therapy (Done)     Point: ADL training (Done)     Description:   Instruct learner(s) on proper safety adaptation and remediation techniques during self care or transfers.   Instruct in proper use of assistive devices.              Learning Progress Summary           Patient Acceptance, E, VU by JJOHN at 6/18/2021 1350    Acceptance, E,TB, VU by GURMEET at 6/17/2021 1354    Comment: Pt educated on spinal precautions, brace management, and log rolling technique. Pt educated on body positioning during LB dressing. Pt educated on LB dressing  completion at home and maintaining safety.    Acceptance, E,D, VU,NR by PRABHJOT at 6/16/2021 1507   Significant Other Acceptance, E,TB, VU by CE at 6/17/2021 1354    Comment: Pt educated on spinal precautions, brace management, and log rolling technique. Pt educated on body positioning during LB dressing. Pt educated on LB dressing completion at home and maintaining safety.                   Point: Home exercise program (Done)     Description:   Instruct learner(s) on appropriate technique for monitoring, assisting and/or progressing therapeutic exercises/activities.              Learning Progress Summary           Patient Acceptance, E,TB, VU,DU,NR by JEFF at 6/19/2021 0852    Comment: Pt. had no issues with performing ue exs to increase his act. nakul with adl tasks!    Acceptance, E,TB, VU by CE at 6/17/2021 1354    Comment: Pt educated on spinal precautions, brace management, and log rolling technique. Pt educated on body positioning during LB dressing. Pt educated on LB dressing completion at home and maintaining safety.    Acceptance, E,D, VU,NR by PRABHJOT at 6/16/2021 1507   Significant Other Acceptance, E,TB, VU,DU,NR by JEFF at 6/19/2021 0852    Comment: Pt. had no issues with performing ue exs to increase his act. nakul with adl tasks!    Acceptance, E,TB, VU by CE at 6/17/2021 1354    Comment: Pt educated on spinal precautions, brace management, and log rolling technique. Pt educated on body positioning during LB dressing. Pt educated on LB dressing completion at home and maintaining safety.                   Point: Precautions (Done)     Description:   Instruct learner(s) on prescribed precautions during self-care and functional transfers.              Learning Progress Summary           Patient Acceptance, E,TB, VU,DU,NR by JEFF at 6/19/2021 0852    Comment: Pt. had no issues with performing ue exs to increase his act. nakul with adl tasks!    Acceptance, E, VU by ELIA at 6/18/2021 1350    Acceptance, E,TB, VU by CE at  6/17/2021 1354    Comment: Pt educated on spinal precautions, brace management, and log rolling technique. Pt educated on body positioning during LB dressing. Pt educated on LB dressing completion at home and maintaining safety.    Acceptance, E,D, VU,NR by PRABHJOT at 6/16/2021 1507   Significant Other Acceptance, E,TB, VU,DU,NR by CJ at 6/19/2021 0852    Comment: Pt. had no issues with performing ue exs to increase his act. nakul with adl tasks!    Acceptance, E,TB, VU by CE at 6/17/2021 1354    Comment: Pt educated on spinal precautions, brace management, and log rolling technique. Pt educated on body positioning during LB dressing. Pt educated on LB dressing completion at home and maintaining safety.                   Point: Body mechanics (Done)     Description:   Instruct learner(s) on proper positioning and spine alignment during self-care, functional mobility activities and/or exercises.              Learning Progress Summary           Patient Acceptance, E,TB, VU,DU,NR by JEFF at 6/19/2021 0852    Comment: Pt. had no issues with performing ue exs to increase his act. nakul with adl tasks!    Acceptance, E, VU by ELIA at 6/18/2021 1350    Acceptance, E,TB, VU by CE at 6/17/2021 1354    Comment: Pt educated on spinal precautions, brace management, and log rolling technique. Pt educated on body positioning during LB dressing. Pt educated on LB dressing completion at home and maintaining safety.    Acceptance, E,D, VU,NR by PRABHJOT at 6/16/2021 1507   Significant Other Acceptance, E,TB, VU,DU,NR by CJ at 6/19/2021 0852    Comment: Pt. had no issues with performing ue exs to increase his act. nakul with adl tasks!    Acceptance, E,TB, VU by CE at 6/17/2021 1354    Comment: Pt educated on spinal precautions, brace management, and log rolling technique. Pt educated on body positioning during LB dressing. Pt educated on LB dressing completion at home and maintaining safety.                               User Key     Initials Effective  Dates Name Provider Type Discipline     06/16/21 -  Torito Valdes, SHILOH/L Occupational Therapy Assistant OT    MW 08/28/18 -  Eva Solano, OTR/L Occupational Therapist OT    JJ 06/16/21 -  Africa Hickey OTR/L Occupational Therapist OT    CE 05/10/21 -  Lucretia Carranza OT Student OT Student OT                  OT Recommendation and Plan     Progress Toward Functional Goals (OT): progress toward functional goals is good  Plan of Care Review  Plan of Care Reviewed With: patient, spouse  Progress: improving  Outcome Summary: Pt. completed ue exs to increase his ability with bed mobility and transfers!  Plan of Care Reviewed With: patient, spouse  Outcome Summary: Pt. completed ue exs to increase his ability with bed mobility and transfers!    Outcome Measures     Row Name 06/19/21 0852             How much help from another is currently needed...    Putting on and taking off regular lower body clothing?  2  -CJ      Bathing (including washing, rinsing, and drying)  2  -CJ      Toileting (which includes using toilet bed pan or urinal)  3  -CJ      Putting on and taking off regular upper body clothing  3  -CJ      Taking care of personal grooming (such as brushing teeth)  4  -CJ      Eating meals  4  -CJ      AM-PAC 6 Clicks Score (OT)  18  -         Functional Assessment    Outcome Measure Options  AM-PAC 6 Clicks Daily Activity (OT)  -        User Key  (r) = Recorded By, (t) = Taken By, (c) = Cosigned By    Initials Name Provider Type     Torito Valdes COTA/L Occupational Therapy Assistant          Time Calculation:   Time Calculation- OT     Row Name 06/19/21 0930 06/19/21 0852          Time Calculation- OT    OT Start Time  --  0852  -     OT Stop Time  --  0920  -     OT Time Calculation (min)  --  28 min  -     Total Timed Code Minutes- OT  --  28 minute(s)  -     OT Received On  --  06/19/21  -        Timed Charges    69160 - OT Therapeutic Exercise Minutes  --  28 -      66266 - Gait Training Minutes   24  -BECKY  --        Total Minutes    Timed Charges Total Minutes  24  -BECKY  28  -CJ      Total Minutes  24  -BECKY  28  -CJ       User Key  (r) = Recorded By, (t) = Taken By, (c) = Cosigned By    Initials Name Provider Type    Torito De COTA/L Occupational Therapy Assistant    Jona Finnegan, NAVIN Physical Therapy Assistant        Therapy Charges for Today     Code Description Service Date Service Provider Modifiers Qty    [id number]  OT THER PROC EA 15 MIN 6/19/2021 Torito Valdes COTA/L GO 2               ADAM Méndez  6/19/2021

## 2021-06-19 NOTE — PLAN OF CARE
Goal Outcome Evaluation:  Plan of Care Reviewed With: patient        Progress: improving  Outcome Summary: Pt performed bed mobility with SBA.  Transfered sit to stand with CGA, cues for UE placement.  Amb 120' with RWX and CGA

## 2021-06-20 NOTE — THERAPY DISCHARGE NOTE
Acute Care - Occupational Therapy Discharge Summary  [facility]     Patient Name: Alin Richard  : 1975  MRN: [id number]    Today's Date: 2021                 Admit Date: 2021        OT Recommendation and Plan    Visit Dx:    ICD-10-CM ICD-9-CM   1. Spinal stenosis of lumbar region without neurogenic claudication  M48.061 724.02   2. Impaired mobility  Z74.09 799.89   3. Decreased activities of daily living (ADL)  Z78.9 V49.89               OT Rehab Goals     Row Name 21 1400             Transfer Goal 1 (OT)    Activity/Assistive Device (Transfer Goal 1, OT)  sit-to-stand/stand-to-sit;bed-to-chair/chair-to-bed;toilet;shower chair;walk-in shower  -MM      [place] Level/Cues Needed (Transfer Goal 1, OT)  supervision required  -MM      Time Frame (Transfer Goal 1, OT)  long term goal (LTG);10 days  -MM      Progress/Outcome (Transfer Goal 1, OT)  goal not met  -MM         Dressing Goal 1 (OT)    Activity/Device (Dressing Goal 1, OT)  upper body dressing;lower body dressing  -MM      [place]/Cues Needed (Dressing Goal 1, OT)  other (see comments) S for LSO, Ruthie for LB dressing  -MM      Time Frame (Dressing Goal 1, OT)  long term goal (LTG);10 days  -MM      Progress/Outcome (Dressing Goal 1, OT)  goal partially met;goal not met;other (see comments) UB dressing goal met, LB dressing portion not met  -MM         Toileting Goal 1 (OT)    Activity/Device (Toileting Goal 1, OT)  toileting skills, all;commode  -MM      [place] Level/Cues Needed (Toileting Goal 1, OT)  supervision required  -MM      Time Frame (Toileting Goal 1, OT)  long term goal (LTG);10 days  -MM      Progress/Outcome (Toileting Goal 1, OT)  goal not met  -MM        User Key  (r) = Recorded By, (t) = Taken By, (c) = Cosigned By    Initials Name Provider Type Discipline    MM Uriel Pimentel, OTR/L Occupational Therapist OT          Outcome Measures     Row Name 21 0852             How much help from another  is currently needed...    Putting on and taking off regular lower body clothing?  2  -CJ      Bathing (including washing, rinsing, and drying)  2  -CJ      Toileting (which includes using toilet bed pan or urinal)  3  -CJ      Putting on and taking off regular upper body clothing  3  -CJ      Taking care of personal grooming (such as brushing teeth)  4  -CJ      Eating meals  4  -CJ      AM-PAC 6 Clicks Score (OT)  18  -CJ         Functional Assessment    Outcome Measure Options  AM-PAC 6 Clicks Daily Activity (OT)  -CJ        User Key  (r) = Recorded By, (t) = Taken By, (c) = Cosigned By    Initials Name Provider Type    CJ Torito Valdes COTA/L Occupational Therapy Assistant          Timed Therapy Charges  Total Units: 2    Charges  Total Units: 2    Procedure Name Documented Minutes Units Code    HC OT THER PROC EA 15 MIN 28  2    51078 (CPT®)               Documented Minutes  Total Minutes: 28    Therapy Provided Minutes    39967 - OT Therapeutic Exercise Minutes 28                    OT Discharge Summary  Anticipated Discharge Disposition (OT): home with assist  Reason for Discharge: Discharge from facility  Outcomes Achieved: Refer to plan of care for updates on goals achieved  Discharge Destination: Home      Uriel Pimentel OTR/L  6/20/2021

## 2021-06-20 NOTE — THERAPY DISCHARGE NOTE
Acute Care - Physical Therapy Discharge Summary  [facility]       Patient Name: Alin Richard  : 1975  MRN: [id number]    Today's Date: 2021                 Admit Date: 2021      PT Recommendation and Plan    Visit Dx:    ICD-10-CM ICD-9-CM   1. Spinal stenosis of lumbar region without neurogenic claudication  M48.061 724.02   2. Impaired mobility  Z74.09 799.89   3. Decreased activities of daily living (ADL)  Z78.9 V49.89       Outcome Measures     Row Name 21 0852             How much help from another is currently needed...    Putting on and taking off regular lower body clothing?  2  -CJ      Bathing (including washing, rinsing, and drying)  2  -CJ      Toileting (which includes using toilet bed pan or urinal)  3  -CJ      Putting on and taking off regular upper body clothing  3  -CJ      Taking care of personal grooming (such as brushing teeth)  4  -CJ      Eating meals  4  -CJ      AM-PAC 6 Clicks Score (OT)  18  -         Functional Assessment    Outcome Measure Options  AM-PAC 6 Clicks Daily Activity (OT)  -        User Key  (r) = Recorded By, (t) = Taken By, (c) = Cosigned By    Initials Name Provider Type    Torito De COTA/L Occupational Therapy Assistant              PT Rehab Goals     Row Name 21 0721             Bed Mobility Goal 1 (PT)    Activity/Assistive Device (Bed Mobility Goal 1, PT)  bed mobility activities, all  -BECKY      [place] Level/Cues Needed (Bed Mobility Goal 1, PT)  independent  -BECKY      Time Frame (Bed Mobility Goal 1, PT)  long term goal (LTG);by discharge  -BECKY      Progress/Outcomes (Bed Mobility Goal 1, PT)  goal not met  -BECKY         Transfer Goal 1 (PT)    Activity/Assistive Device (Transfer Goal 1, PT)  sit-to-stand/stand-to-sit;bed-to-chair/chair-to-bed  -BECKY      [place] Level/Cues Needed (Transfer Goal 1, PT)  independent  -BECKY      Time Frame (Transfer Goal 1, PT)  long term goal (LTG);by discharge  -BECKY       Progress/Outcome (Transfer Goal 1, PT)  goal not met  -BECKY         Gait Training Goal 1 (PT)    Activity/Assistive Device (Gait Training Goal 1, PT)  gait (walking locomotion);decrease fall risk;increase endurance/gait distance;improve balance and speed  -BECKY      [place] Level (Gait Training Goal 1, PT)  independent  -BECKY      Distance (Gait Training Goal 1, PT)  150ft  -BECKY      Time Frame (Gait Training Goal 1, PT)  long term goal (LTG);by discharge  -BECKY      Progress/Outcome (Gait Training Goal 1, PT)  goal not met  -BECKY        User Key  (r) = Recorded By, (t) = Taken By, (c) = Cosigned By    Initials Name Provider Type Discipline    Jona Finnegan PTA Physical Therapy Assistant PT          Therapy Charges for Today     Code Description Service Date Service Provider Modifiers Qty    00939997343 HC GAIT TRAINING EA 15 MIN 6/19/2021 Jona Hammonds PTA GP 2    [id number] HC GAIT TRAINING EA 15 MIN 6/19/2021 Jona Hammonds, NAVIN GP 1          PT Discharge Summary  Anticipated Discharge Disposition (PT): home with assist  Reason for Discharge: Discharge from facility  Outcomes Achieved: Refer to plan of care for updates on goals achieved  Discharge Destination: Home with assist      Jona Hammonds PTA   6/20/2021

## 2021-06-21 NOTE — PAYOR COMM NOTE
"DC HOME 6-19-21    [id number]   [phone]  Alin Richard (46 y.o. Male)     Date of Birth Social Security Number Address Home Phone MRN    1975  [address] [facility] [address] [phone] [id number]    Religious Marital Status          Catholic        Admission Date Admission Type Admitting Provider Attending Provider Department, Room/Bed    6/16/21 Elective JULIUS Mirza MD  [facility] 3A, 345/1    Discharge Date Discharge Disposition Discharge Destination        6/19/2021 Home or Self Care              Attending Provider: (none)   Allergies: Eggs Or Egg-derived Products    Isolation: None   Infection: None   Code Status: Prior    Ht: 185.5 cm (73.03\")   Wt: 153 kg (337 lb 15.4 oz)    Admission Cmt: None   Principal Problem: None                Active Insurance as of 6/16/2021     Primary Coverage     Payor Plan Insurance Group Employer/Plan Group    [facility] [employer] [id number]     Payor Plan Address Payor Plan Phone Number Payor Plan Fax Number Effective Dates    [address] [phone]  6/1/2019 - None Entered    [place] [address]       Subscriber Name Subscriber Birth Date Member ID       ALIN RICHARD 1975 [id number]                 Emergency Contacts      (Rel.) Home Phone Work Phone Mobile Phone    Keke Richard (Spouse) [phone] -- --               Operative/Procedure Notes (all)      JULIUS Mirza MD at 06/16/21 0540  Version 1 of 1       LUMBAR ANTERIOR INTERBODY FUSION  Procedure Note    Alin Richard  6/16/2021    Pre-op Diagnosis:    1.  Factor V Leiden deficiency.   2.  Status post hemilaminotomy decompression right L4 to S1, Dr. Favio Castillo, 12/01/2017.   3.  Increasing chronic back pain.    4.  Residual bilateral buttock, thigh and leg radiculopathy, right worse than left.   5.  Chronic right gastrocsoleus complex weakness.    6.  Degenerative disc disease L4 to S1, severe " L5-S1.    7.  Retrolisthesis L5-S1.    8.  Facet arthropathy L4 to S1, worse at L5-S1.   9.  Chronic recurrent disc herniation right L5-S1.   10.  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   11.  Obesity, BMI of 43.     Post-op Diagnosis:    same    Procedure/CPT® Codes:     1. Anterior discectomy decompression with bilateral neural foraminotomy L5-S1  2. Anterior lumbar interbody fusion L5-S1  3. Anterior spinal instrumentation L5-S1 (ATEC anterior plate and screws)  4. Use of titanium interbody biomechanical device for fusion L5-S1 (CoreLink titanium spacer)  5. Use of allograft bone matrix for fusion L5-S1  6. Use of allograft liquid for fusion L5-S1 (BioBurst)  7. Use of fluoroscopy for confirmation of surgical level, placement of interbody spacer and instrumentation  8. Intraoperative neural monitoring     Anesthesia: General     Surgeon: RODGER Mirza MD     Co Surgeon: Dr. Kristopher Ames D.O.     Assistant: Parmjit Stahl PA-C     Estimated Blood Loss: 50 mL     Complications: None     Condition: Stable to PACU.     Indications:     The patient is a 46-year-old who sees Dr. Lev Stallworth for medical issues.  He has a medical history significant for factor V Leiden deficiency.  He presented to the office with a history of a prior decompression on the right side from L4-S1 performed by Dr. Favio Castillo on 12/1/2017.  The procedure did help with some of his leg radiculopathy but unfortunately continued to have chronic back pain as well as a recurrence of symptoms down both lower extremities, with the right side worse than the left.  He also was noted to have chronic weakness in his right gastrocsoleus complex.  Imaging studies revealed degenerative disc disease and facet arthropathy from L4-S1 that was severe at L5-S1, where there was also retrolisthesis and a chronic recurrent disc herniation on the right causing central and foraminal stenosis that was severe on the right side.  While he  did have degenerative changes and stenosis at L4-5, it was felt that this was minimally symptomatic.  It is possible he may require surgery at the L4-5 level at some point in the future.    After failing all conservative measures, it was mutually decided that surgery would be the best option.  Risks, benefits, and complications of surgery were discussed in detail. The patient appeared well informed and wished to proceed. We specifically discussed the risk of infection, blood loss, nerve root injury, CSF leak, and the possibility of incomplete resolution of symptoms. We also discussed the possible risk of a nonunion and the potential need for additional surgery in the event of a pseudoarthrosis or hardware failure.    We elected to proceed with a staged operation.  Today we are performing an anterior decompression and fusion of L5-S1, it is planned that we will be returning to surgery for a second posterior procedure at a later date.     Operative Procedure:     After obtaining informed consent and verifying the correct operative level, the patient was brought to the operating room and placed supine on an operating table. A general anesthetic was provided by the anesthesia service with the assistance of an endotracheal tube. Once this was appropriately positioned and secured, the anterior abdominal region was prepped and draped in usual sterile fashion. A surgical timeout was taken to confirm this was the correct patient, we were working at the correct level, and that preoperative antibiotics were given in a timely fashion.     At this point, Dr. Kristopher Ames D.O. provided vascular access to the L5-S1 level. He performed a left sided anterior retroperitoneal approach to the L5-S1 segment. Please see his separate dictated operative report regarding the details on the approach itself.  When I entered the procedure, self retaining retractors were already in position with excellent exposure of the L5-S1 disc space.      After confirming we were at the correct level using fluoroscopy, I used a long handle 10 blade scalpel to cut into the L5-S1 disc space. A Agustin elevator was used to remove disc material off of the endplates. Disc material was retrieved using pituitaries and Kerrisons. A disc space distractor was then placed into the L5-S1 disc space and I used curettes to remove posterior disc material. Kerrisons were used to remove posterior osteophytes across the endplates of L5 and S1. There was high-grade stenosis centrally but also foraminally. I then performed a bilateral neural foraminotomy with Kerrisons and curettes. The decompression was much more involved than what is usually required for an anterior lumbar interbody fusion by itself and required significantly more time to perform.  This was due to the severe disc space collapse and high-grade foraminal stenosis.     After the decompression was completed bilaterally and centrally, I used a series of endplate scrapers to prepare the endplates for interbody fusion. Trial spacers were then malleted into position and it was felt that a 17 mm titanium spacer with the largest footprint and 50 degrees lordosis from the CoreLink instrumentation set would be the best fit to restore disc height also restoring foraminal height and providing some indirect decompression. The disc space was then thoroughly irrigated with saline solution.  Gelfoam powder with thrombin was used to control epidural bleeding.     A 17 mm titanium spacer from the CoreLink instrumentation set was then packed as tightly as possible with allograft bone matrix mixed with an allograft liquid called BioBurst. This spacer was then malleted into the L5-S1 disc space under fluoroscopic guidance. It was placed as an interbody biomechanical device to assist with fusion.  I then placed a 25 mm screw through the spacer into L5 and a 20 mm screw through the spacer into S1 to help maintain position and prevent migration  of the spacer.     A 4-hole anterior plate from the [facility] instrumentation set was then chosen. The 4 holes were drilled and four 30 mm screws were used to fix the plate across the L5-S1 segment augmenting the fusion.      We then inspected the entire operative field for any signs of bleeding.  Bleeding was once again controlled using thrombin with Gelfoam powder and bipolar cautery.  Once we ensured that adequate hemostasis was accomplished, final fluoroscopy imaging was taken to confirm adequate position of our implants. There was excellent restoration of disc height and the plate and screw construct appeared to be adequately positioned across L5-S1.     Please see Dr. Kristopher Ames's separate dictated operative report regarding the closure of the wound. Once this was accomplished, the patient was extubated and sent to the recovery room in good stable condition. We estimated blood loss to be approximately 50 mL and the patient remained hemodynamically stable during the procedure.     Dr. Kristopher Ames D.O. provided vascular access to the L5-S1 level and acted as a co-surgeon in this fashion.  Parmjit Stahl PA-C provided critical assistance during the decompression at L5-S1 as well as during the placement of the titanium spacer, bone graft and instrumentation to obtain a fusion across L5-S1.    RODGER Mirza MD    Date: 6/16/2021  Time: 08:47 CDT      Electronically signed by JULIUS Mirza MD at 06/16/21 0847     Kristopher Ames DO at 06/16/21 0631  Version 1 of 1       Alin Richard  6/16/2021     PREOPERATIVE DIAGNOSIS:   1.  Factor V Leiden deficiency.   2.  Status post hemilaminotomy decompression right L4 to S1, Dr. Favio Castillo, 12/01/2017.   3.  Increasing chronic back pain.    4.  Residual bilateral buttock, thigh and leg radiculopathy, right worse than left.   5.  Chronic right gastrocsoleus complex weakness.    6.  Degenerative disc disease L4 to S1, severe L5-S1.    7.  Retrolisthesis  L5-S1.    8.  Facet arthropathy L4 to S1, worse at L5-S1.   9.  Chronic recurrent disc herniation right L5-S1.   10.  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   11.  Obesity, BMI of 43.      POSTOPERATIVE DIAGNOSIS: same     PROCEDURE PERFORMED:   1.  Anterior lumbar interbody fusion of L5-S1 with instrumentation  2.  Placement of a 13 cm Prevena wound VAC     SURGEON: Kristopher Ames DO   COSURGEON: Kristopher Mirza MD     ANESTHESIA: General.    PREPARATION: Routine.    STAFF: Circulator: Ml Morley RN  Radiology Technologist: Viridiana Sanders  Scrub Person: Elayne Pandya; Ciro Marin; Dasha Thomas  Vendor Representative: Huber Jesus  Assistant: Parmjit Stahl PA-C    ESTIMATED BLOOD LOSS: 50 mL    SPECIMENS: None    COMPLICATIONS: None    INDICATIONS: Alin Richard is a 46 y.o. male who you are currently following for chronic back pain.  Alin Richardis scheduled for anterior lumbar interbody fusion of L5-S1 with Dr. Mirza on 6/16/21.  The patient denies any history of DVT to his lower extremities.  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  He has Factor V Leiden diagnosed 4 years ago.  He is chronically anticoagulated with Xarelto. The indications, risks, and possible complications of the procedure were explained to the patient, who voiced understanding and wished to proceed with surgery.     PROCEDURE IN DETAIL:   The patient was taken to the operating room and placed on the operating table in a supine position. After general anesthesia was obtained, the abdomen was prepped and draped in a sterile manner.  A transverse incision was then made in the left lower quadrant.  Careful dissection was made down through the subcutaneous tissues using the Bovie cautery to ensure hemostasis.  Any crossing veins were ligated with 3-0 silk suture and hemoclips.  The rectus fascia was identified.  It was incised with the Bovie cautery.  Kocher  clamps are placed on each side of the rectus fascia and subfascial planes were established in a cephalad and caudad direction.  Once the subfascial planes were established the attention was then turned to the left rectus muscle.  Blunt mobilization was made of the left rectus muscle including its blood supply medially and to the right.  Once it was fully mobilized the attention was then turned laterally to the peritoneal reflection.  Entrance into the retroperitoneal space was established with the use of a sponge stick and the Bovie cautery.  Continued blunt mobilization was made with my hand using a finger sweeping motion moving the peritoneal contents and sacral fat pad medially and to the right.  Once it was fully mobilized the Brau-Richard retractor system was set up.  The retractor blades were set in place.  The left iliac vein was then carefully dissected free and placed safely behind the retractor blade.  The sacral vessels were carefully taken down with hemoclips.  At this point full exposure was established of the L5-S1 disc space.  The next part of the case will be dictated by Dr. Mirza. Upon completion of Dr. Mirza's part of the case the wound bed was irrigated with antibiotic saline and hemostasis was observed.  The retractor blades were carefully taken out one at a time.  The structures were then placed back in their normal anatomic positions.  The rectus fascia was then closed with a #1 PDS in a running fashion to meet in the midline.  The deep layers were closed with a 2-0 Vicryl in a running fashion.  The subcutaneous layers were closed with a 3-0 Vicryl in a running fashion.  The skin was then reapproximated using a 4-0 Monocryl in a subcuticular fashion.  The wound was then cleaned.  A 13 cm Prevena wound VAC was placed. The patient tolerated the procedure well. Sponge and needle counts were correct. The patient was then awakened and extubated in the operating room and taken to the recovery  room in good condition.    Kristopher Ames DO    Date: 6/16/2021 Time: 09:43 CDT      Electronically signed by Kristopher Ames DO at 06/16/21 0944     JULIUS Mirza MD at 06/18/21 0714  Version 1 of 1       Posterior lumbar fusion with instrumentation procedure Note    Alin Richard  6/18/2021    Pre-op Diagnosis:     1.  Factor V Leiden deficiency.   2.  Status post hemilaminotomy decompression right L4 to S1, Dr. Faivo Castillo, 12/01/2017.   3.  Increasing chronic back pain.    4.  Residual bilateral buttock, thigh and leg radiculopathy, right worse than left.   5.  Chronic right gastrocsoleus complex weakness.    6.  Degenerative disc disease L4 to S1, severe L5-S1.    7.  Retrolisthesis L5-S1.    8.  Facet arthropathy L4 to S1, worse at L5-S1.   9.  Chronic recurrent disc herniation right L5-S1.   10.  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   11.  Obesity, BMI of 43.   12.  Status post anterior decompression, ALIF with instrumentation L5-S1, 6/16/2021    Post-op Diagnosis:    same    Procedure/CPT® Codes:    1.  Posterior spinal fusion L5-S1  2.  Posterior spinal instrumentation L5-S1 (ATEC pedicle screws and anshul)  3.  Use of locally obtained autograft bone for fusion  4.  Use of fluoroscopy for confirmation of surgical level and placement of instrumentation  5.  Intraoperative neural monitoring with pedicle screw stimulation    Anesthesia: General    Surgeon: RODGER Mirza MD    Assistant: Parmjit Stahl PA-C    Estimated Blood Loss: minimal    Complications: None    Condition: Stable to PACU.    Indications:    The patient is a 46-year-old who sees Dr. Lev Stallworth for medical issues.  He has a medical history significant for factor V Leiden deficiency.  He presented to the office with a history of a prior decompression on the right side from L4-S1 performed by Dr. Favio Castillo on 12/1/2017.  The procedure did help with some of his leg radiculopathy but unfortunately  continued to have chronic back pain as well as a recurrence of symptoms down both lower extremities, with the right side worse than the left.  He also was noted to have chronic weakness in his right gastrocsoleus complex.  Imaging studies revealed degenerative disc disease and facet arthropathy from L4-S1 that was severe at L5-S1, where there was also retrolisthesis and a chronic recurrent disc herniation on the right causing central and foraminal stenosis that was severe on the right side.  While he did have degenerative changes and stenosis at L4-5, it was felt that this was minimally symptomatic.  It is possible he may require surgery at the L4-5 level at some point in the future.     After failing all conservative measures, it was mutually decided that surgery would be the best option. Risks, benefits, and complications of surgery were discussed in detail. The patient appeared well informed and wished to proceed. We specifically discussed the risk of infection, blood loss, nerve root injury, CSF leak, and the possibility of incomplete resolution of symptoms. We also discussed the possible risk of a nonunion and the potential need for additional surgery in the event of a pseudoarthrosis or hardware failure.      We elected proceed with a staged operation.  Previously on 6/16/2021, the patient underwent an anterior decompression and fusion with instrumentation of L5-S1.  Today we return to surgery for the second posterior stage of the procedure.     Operative Procedure:    After obtaining informed consent and verifying the correct operative site, the patient was brought to the operating room. A general anesthetic was provided by the anesthesia service with the assistance of an endotracheal tube. Once this was appropriately positioned and secured, the patient was carefully rotated prone onto a Oscar frame. All bony processes were well-padded. The lumbar region was prepped and draped in usual sterile fashion. A  surgical timeout was taken to confirm this was the correct patient, we were working at the correct levels, and that preoperative antibiotics were given in a timely fashion.    Using fluoroscopy for guidance, a left sided Wiltsey incision was created overlying the L5-S1 segment using a 10 blade scalpel.  Dissection was carried through subcutaneous tissues using Bovie cautery.  The lumbar fascia was divided in line with the incision and blunt dissection was carried between the multifidus and the longissimus down to the facet joint of L5-S1.  Dissection was carried laterally exposing the transverse processes of L5 and the sacral ala.  We then exposed the L5-S1 facet joint.  The capsule was destroyed using Bovie cautery exposing as much bone as possible.  The high-speed bur was then used to decorticate the facet joint, destroying as much of the facet cartilage as possible.  I also decorticated the lateral pars region and the tranverse processes.  The locally obtained autograft bone was left in situ in the posterolateral gutter.  This constituted a posterior fusion of L5-S1.    Next under fluoroscopic guidance, Jamshidi needles were used to cannulate the pedicles of L5 and S1.  Once the needles were properly positioned in the pedicles, guidewires were placed to maintain position.  Over each of the guidewires, an [facility] pedicle screw was placed.  We used 6.5 mm x 45 mm screws into each of the pedicles.  I then used a neuro monitoring probe to stimulate the screws themselves confirming appropriate position ensuring no breach of the pedicle.  After confirming the screws were properly positioned, an appropriate-sized anshul was chosen to span the pedicle screws.  The anshul was tightened into position using set screws.  The setscrews were tightened using the appropriate antitorque wrench and torque limiting screwdriver provided by [facility].    The wound was then thoroughly irrigated and an inspection was then undertaken to ensure that  we had adequate hemostasis.  Bleeding at this point was controlled using thrombin with Gelfoam powder and bipolar cautery.  Final fluoroscopy imaging confirmed adequate position of the newly placed posterior instrumentation.    Wound closure was then accomplished by reapproximating the fascia with a #1 Vicryl area immediate subcutaneous tissues were closed using a 2-0 Vicryl and skin closure was augmented using Mastisol and Steri-Strips.  The incision was then washed and sterilely dressed with a Bioclusive dressing.    The patient was then carefully rotated supine onto a hospital gurney, extubated, and sent to the recovery room in good stable condition.  The patient tolerated the procedure well.  There were no complications.  We estimated blood loss to be minimal.    Parmjit Stahl PA-C provided critical assistance during the procedure.  His assistance was medically necessary in order to allow the procedure to occur in the most safe and efficient manner.    RODGER Mirza MD     Date: 6/18/2021  Time: 09:17 CDT      Electronically signed by JULIUS Mirza MD at 06/18/21 0917          Discharge Summary      Parmjit Stahl PA-C at 06/19/21 1216            Date of Discharge:  6/19/2021    Admission Diagnosis: M54.16    Discharge Diagnosis:   1.  Factor V Leiden deficiency.   2.  Status post hemilaminotomy decompression right L4 to S1, Dr. Favio Castillo, 12/01/2017.   3.  Increasing chronic back pain.    4.  Residual bilateral buttock, thigh and leg radiculopathy, right worse than left.   5.  Chronic right gastrocsoleus complex weakness.    6.  Degenerative disc disease L4 to S1, severe L5-S1.    7.  Retrolisthesis L5-S1.    8.  Facet arthropathy L4 to S1, worse at L5-S1.   9.  Chronic recurrent disc herniation right L5-S1.   10.  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   11.  Obesity, BMI of 43.   12.  Status post anterior decompression, ALIF with instrumentation L5-S1, 6/16/2021  13.   "Status post PSF with instrumentation L5-S1, 6/18/2021    Consults During Admission: Dr. Wagner    Hospital Course  Patient is a 46 y.o. male Known to our practice. Admitted for the above staged lumbar fusion.  This has been well tolerated and the patient will be discharged home today in good stable condition with instructions for brace when out of bed.  No driving until directed.  Patient will follow-up with Dr. Mirza's clinic in two weeks. They will call if problems arise.         Condition on Discharge:  STABLE    Vital Signs  Temp:  [98.1 °F (36.7 °C)-100.1 °F (37.8 °C)] 98.5 °F (36.9 °C)  Heart Rate:  [] 94  Resp:  [16-18] 16  BP: (109-130)/(57-75) 130/68    Physical Exam:   Alert and oriented ×3, no acute distress, grossly neurovascularly intact, vital signs stable, dressing clean dry and intact, moving all extremities without focal deficit      Discharge Disposition      Discharge Medications     Discharge Medications      Continue These Medications      Instructions Start Date   B-D 3CC LUER-SRIDHAR SYR 32PC5-8/2 22G X 1-1/2\" 3 ML misc  Generic drug: Syringe/Needle (Disp)   USE AS DIRECTED         ASK your doctor about these medications      Instructions Start Date   cholecalciferol 25 MCG (1000 UT) tablet  Commonly known as: VITAMIN D3   1,000 Units, Oral, Daily      cyclobenzaprine 10 MG tablet  Commonly known as: FLEXERIL   10 mg, Oral, Nightly      levothyroxine 50 MCG tablet  Commonly known as: SYNTHROID, LEVOTHROID   TAKE 1 TABLET BY MOUTH DAILY      lisinopril 10 MG tablet  Commonly known as: PRINIVIL,ZESTRIL   TAKE 1 TABLET EVERY DAY      oxyCODONE-acetaminophen  MG per tablet  Commonly known as: PERCOCET   1 tablet, Oral, Every 4 Hours PRN      PARoxetine 20 MG tablet  Commonly known as: PAXIL   20 mg, Oral, Every Morning      rivaroxaban 20 MG tablet  Commonly known as: XARELTO   20 mg, Oral, Daily      Testosterone Cypionate 200 MG/ML injection  Commonly known as: DEPOTESTOTERONE " CYPIONATE   200 mg, Intramuscular, Every 14 Days      topiramate 50 MG tablet  Commonly known as: TOPAMAX   50 mg, Oral, Daily             Discharge Diet: Resume Home diet, advance as tolerated    Activity at Discharge: Resume home activity, advance as tolerated, no lifting, no twisting, no bending, brace as directed, no driving until directed.     Follow-up Appointments  Followup with PCP within one week  Followup [facility] at 2 weeks post-op         Parmjit Stahl PA-C  06/19/21  12:16 CDT                Electronically signed by Parmjit Stahl PA-C at 06/19/21 [id number]

## 2022-08-21 NOTE — PATIENT INSTRUCTIONS
BMI for Adults  Body mass index (BMI) is a number that is calculated from a person's weight and height. In most adults, the number is used to find how much of an adult's weight is made up of fat. BMI is not as accurate as a direct measure of body fat.  HOW IS BMI CALCULATED?  BMI is calculated by dividing weight in kilograms by height in meters squared. It can also be calculated by dividing weight in pounds by height in inches squared, then multiplying the resulting number by 703. Charts are available to help you find your BMI quickly and easily without doing this calculation.   HOW IS BMI INTERPRETED?  Health care professionals use BMI charts to identify whether an adult is underweight, at a normal weight, or overweight based on the following guidelines:  · Underweight: BMI less than 18.5.  · Normal weight: BMI between 18.5 and 24.9.  · Overweight: BMI between 25 and 29.9.  · Obese: BMI of 30 and above.  BMI is usually interpreted the same for males and females.  Weight includes both fat and muscle, so someone with a muscular build, such as an athlete, may have a BMI that is higher than 24.9. In cases like these, BMI may not accurately depict body fat. To determine if excess body fat is the cause of a BMI of 25 or higher, further assessments may need to be done by a health care provider.  WHY IS BMI A USEFUL TOOL?  BMI is used to identify a possible weight problem that may be related to a medical problem or may increase the risk for medical problems. BMI can also be used to promote changes to reach a healthy weight.     This information is not intended to replace advice given to you by your health care provider. Make sure you discuss any questions you have with your health care provider.     Document Released: 08/29/2005 Document Revised: 01/08/2016 Document Reviewed: 05/15/2015  [employer] Interactive Patient Education ©2017 [employer]     Pt has discharge orders. Pt educated on discharge instructions and new prescriptions.  Pt verbalizes understanding.  Pt ambulatory to lobby with steady gait. Pt going home with self.

## 2022-11-02 ENCOUNTER — TELEPHONE (OUTPATIENT)
Dept: BARIATRICS/WEIGHT MGMT | Facility: CLINIC | Age: 47
End: 2022-11-02

## 2022-11-02 NOTE — TELEPHONE ENCOUNTER
Patient was called to remind them of their new patient appointment and to bring completed paperwork or arrive 30 minutes early, also they were instructed to go to [place] [id number] to complete the seminar and register. The patient was agreeable and voiced an understanding.           Patient has not received his new patient packet but was agreeable to arrive at 9:30

## 2022-11-03 ENCOUNTER — OFFICE VISIT (OUTPATIENT)
Dept: BARIATRICS/WEIGHT MGMT | Facility: CLINIC | Age: 47
End: 2022-11-03

## 2022-11-03 VITALS
TEMPERATURE: 98.4 F | WEIGHT: 315 LBS | HEIGHT: 73 IN | HEART RATE: 81 BPM | DIASTOLIC BLOOD PRESSURE: 94 MMHG | BODY MASS INDEX: 41.75 KG/M2 | OXYGEN SATURATION: 96 % | SYSTOLIC BLOOD PRESSURE: 148 MMHG

## 2022-11-03 DIAGNOSIS — K21.9 GASTROESOPHAGEAL REFLUX DISEASE, UNSPECIFIED WHETHER ESOPHAGITIS PRESENT: ICD-10-CM

## 2022-11-03 DIAGNOSIS — I10 ESSENTIAL HYPERTENSION: ICD-10-CM

## 2022-11-03 DIAGNOSIS — E66.01 CLASS 3 SEVERE OBESITY DUE TO EXCESS CALORIES WITH SERIOUS COMORBIDITY AND BODY MASS INDEX (BMI) OF 45.0 TO 49.9 IN ADULT: Primary | ICD-10-CM

## 2022-11-03 DIAGNOSIS — D68.51 FACTOR V LEIDEN: ICD-10-CM

## 2022-11-03 PROCEDURE — 99204 OFFICE O/P NEW MOD 45 MIN: CPT | Performed by: NURSE PRACTITIONER

## 2022-11-03 NOTE — PROGRESS NOTES
"Metabolic and Bariatric Surgery Adult Nutrition Assessment    Patient Name: Alin Richard   YOB: 1975   MRN: [id number]     Assessment Date:  11/03/2022     Reason for Visit: Initial Nutrition Assessment     Treatment Pathway: Preoperative Bariatric Surgery, Visit 1    Assessment    Anthropometrics   Wt Readings from Last 1 Encounters:   11/03/22 (!) 164 kg (361 lb 9.6 oz)     Ht Readings from Last 1 Encounters:   11/03/22 185.4 cm (73\")     BMI Readings from Last 1 Encounters:   11/03/22 47.71 kg/m²        Initial Weight/Date: 361.6 lbs  Weight Changes since last visit: n/a  Net Weight Change: n/a    Past Medical History:   Diagnosis Date   • Arthritis    • Depression    • Disease of thyroid gland    • Factor V Leiden (HCC)    • GERD (gastroesophageal reflux disease)    • Hypertension    • Infarction of kidney (HCC)     BLOOD CLOT IN KIDNEY   • Injury of back    • Migraine    • Murmur, heart    • PTSD (post-traumatic stress disorder)       Past Surgical History:   Procedure Laterality Date   • ANTERIOR LUMBAR EXPOSURE N/A 06/16/2021    Procedure: ANTERIOR LUMBAR EXPOSURE;  Surgeon: Kristopher Ames DO;  Location:  [facility];  Service: Vascular;  Laterality: N/A;   • BACK SURGERY     • LUMBAR DISCECTOMY Bilateral 12/01/2017    Procedure: LUMBAR Micro DISCECTOMY LEFT L4-5 and RIGHT L5-S1;  Surgeon: Favio Castillo MD;  Location:  [facility];  Service:    • LUMBAR FUSION N/A 06/16/2021    Procedure: ANTERIOR DECOMPRESSION, ANTERIOR LUMBAR INTERBODY FUSION WITH INSTRUMENTATION L5-S1;  Surgeon: JULIUS Mirza MD;  Location:  [facility];  Service: Orthopedic Spine;  Laterality: N/A;   • LUMBAR LAMINECTOMY WITH FUSION N/A 06/18/2021    Procedure: POSTERIOR SPINAL FUSION WITH INSTRUMENTATION L5-S1;  Surgeon: JULIUS Mirza MD;  Location:  [facility];  Service: Orthopedic Spine;  Laterality: N/A;   • TONSILLECTOMY     • VASECTOMY        Current Outpatient Medications   Medication Sig Dispense Refill " "  • B-D 3CC LUER-SRIDHAR SYR 64ID7-5/2 22G X 1-1/2\" 3 ML misc USE AS DIRECTED  1   • cyclobenzaprine (FLEXERIL) 10 MG tablet Take 1 tablet by mouth 3 (Three) Times a Day As Needed for Muscle Spasms. 60 tablet 0   • levothyroxine (SYNTHROID, LEVOTHROID) 50 MCG tablet TAKE 1 TABLET BY MOUTH DAILY  11   • lisinopril (PRINIVIL,ZESTRIL) 10 MG tablet TAKE 1 TABLET EVERY DAY  5   • oxyCODONE-acetaminophen (PERCOCET)  MG per tablet Take 1 tablet by mouth Every 4 (Four) Hours As Needed.     • PARoxetine (PAXIL) 20 MG tablet Take 20 mg by mouth Every Morning.     • rivaroxaban (XARELTO) 20 MG tablet Take 20 mg by mouth Daily.     • Testosterone Cypionate (DEPOTESTOTERONE CYPIONATE) 200 MG/ML injection Inject 200 mg into the appropriate muscle as directed by prescriber Every 14 (Fourteen) Days.     • topiramate (TOPAMAX) 50 MG tablet Take 50 mg by mouth Daily.       No current facility-administered medications for this visit.      Allergies   Allergen Reactions   • Eggs Or Egg-Derived Products Nausea And Vomiting          Motivation for weight loss includes: reduce back pain & improve energy. Would like to be outdoors more.     Pertinent Social/Behavior/Environmental History: is s/p multiple back surgeries. 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 Lives alone. Grocery shop only as necessary.     Nutrition Recall  Eating 3 meals daily   (M1) 2 full size KIND bars with coffee (black, 3/4 of a large tumbler)  (M2) pulled pork (no bread) with coleslaw and backed beans. Goes to a local restaurant.  (M3) can of darlene sausages and trail mix. Whatever is at home. Hamburger. Eggs/cantor.   (M4) n/a  Snacking - rarely.   Drinking sugary/carbonated beverages- dt mt dew at lunch. cran-apple juice at home most of the time. Occasionally dt dr pepper.   Fluid Intake- black coffee. Limited water.    Exercise: minimal. Some housework.  Recommended increasing physical activity, beyond normal daily habits, gradually working to reach ~30 minutes daily. "     Nutrition Intervention  Nutrition education and nutrition coaching for behavior change provided.  Strategies used included Comprehensive education, Motivational Interviewing , Problem Solving, Skill Development for meal planning and timing of meals and Provided sample menus  Review of medical weight loss prescription 4 meal/day plan and reviewed nutritional needs for Preoperative Bariatric Surgery, Visit 1.  Self-monitoring strategies such as keeping a food journal (on paper or electronically) and calculating fluid/protein intake were discussed.    Recommended Diet Changes  Eat 4 meals per day with protein and vegetables at each meal, no carbs after meal 2., Protein goal: 80 gms., Eat vegetables first at each meal., Discussed protein guidelines for shakes and bars., Reduce snacking -use foods from free foods list only., Reduce fat, sugar, and/or salt in food choices., Choose more nutrient dense foods., Choose foods with increased fiber., Monitor portion sizes using a food scale and/or measuring cup., Eliminate soda and sugar-sweetened beverages and Increase fluid intake to 64 ounces per day      Goals  1. Spend 10-20 minutes each night to plan for four meals for the next day.  2. Measure portions  3. Record intake.    Monitoring/Evaluation Plan  Anticipate follow up per program protocol. Continue collaboration of care with physician and treatment team.     Electronically signed by  Rosie Greenwood RDN, LD  11/03/2022 11:26 CDT.

## 2022-11-03 NOTE — ASSESSMENT & PLAN NOTE
Patient's (Body mass index is 47.71 kg/m².) indicates that they are morbidly obese (BMI > 40 or > 35 with obesity - related health condition) with health conditions that include hypertension and GERD . Weight is worsening. BMI is is above average; BMI management plan is completed. We discussed portion control and increasing exercise.

## 2022-11-03 NOTE — PROGRESS NOTES
Patient Care Team:  Lev Stallworth MD as PCP - General  Lev Stallworth MD as PCP - Family Medicine      Subjective     Patient is a 47 y.o. male presents with morbid obesity and his Body mass index is 47.71 kg/m².     He is here for discussion of surgical weight loss options.  He stated he has been with the disease of obesity for year(s).  He stated he suffers from gout,GERD, HTN and morbid obesity due to his weight gain.  He stated that weight loss helps alleviate these symptoms.   He stated that he has tried other diet regimens including ATKINS, calorie counting, high protein, weight watchers to help with weight loss.  He stated that he has attempted these conservative methods for weight loss without maintaining long term success.  Today he would like to discuss surgical weight loss options such as the Laparoscopic Sleeve Gastrectomy or the Laparoscopic R - Y Gastric Bypass.     Review of Systems   Constitutional: Negative.    Respiratory: Negative.    Cardiovascular: Negative.    Gastrointestinal: Negative.    Endocrine: Negative.    Musculoskeletal: Positive for back pain.   Psychiatric/Behavioral: Negative.         History  Past Medical History:   Diagnosis Date   • Arthritis    • Depression    • Disease of thyroid gland    • Factor V Leiden (HCC)    • GERD (gastroesophageal reflux disease)    • Hypertension    • Infarction of kidney (HCC)     BLOOD CLOT IN KIDNEY   • Injury of back    • Migraine    • Murmur, heart    • PTSD (post-traumatic stress disorder)       Past Surgical History:   Procedure Laterality Date   • ANTERIOR LUMBAR EXPOSURE N/A 06/16/2021    Procedure: ANTERIOR LUMBAR EXPOSURE;  Surgeon: Kristopher Ames DO;  Location:  [facility];  Service: Vascular;  Laterality: N/A;   • BACK SURGERY     • LUMBAR DISCECTOMY Bilateral 12/01/2017    Procedure: LUMBAR Micro DISCECTOMY LEFT L4-5 and RIGHT L5-S1;  Surgeon: Favio Castillo MD;  Location:  [facility];  Service:    • LUMBAR FUSION N/A  "2021    Procedure: ANTERIOR DECOMPRESSION, ANTERIOR LUMBAR INTERBODY FUSION WITH INSTRUMENTATION L5-S1;  Surgeon: JULIUS Mirza MD;  Location:  [facility];  Service: Orthopedic Spine;  Laterality: N/A;   • LUMBAR LAMINECTOMY WITH FUSION N/A 2021    Procedure: POSTERIOR SPINAL FUSION WITH INSTRUMENTATION L5-S1;  Surgeon: JULIUS Mirza MD;  Location:  [facility];  Service: Orthopedic Spine;  Laterality: N/A;   • TONSILLECTOMY     • VASECTOMY        Social History     Socioeconomic History   • Marital status:    Tobacco Use   • Smoking status: Former     Packs/day: 1.00     Years: 5.00     Pack years: 5.00     Types: Cigarettes     Quit date: 1998     Years since quittin.1   • Smokeless tobacco: Current     Types: Snuff   Vaping Use   • Vaping Use: Never used   Substance and Sexual Activity   • Alcohol use: Yes     Alcohol/week: 1.0 standard drink     Types: 1 Shots of liquor per week     Comment: occassional/ WEEKENDS   • Drug use: No   • Sexual activity: Yes     Partners: Female      Family History   Problem Relation Age of Onset   • Arthritis Mother    • Hypertension Mother    • Clotting disorder Mother    • Aneurysm Father    • No Known Problems Sister    • No Known Problems Brother       Allergies   Allergen Reactions   • Eggs Or Egg-Derived Products Nausea And Vomiting          Current Outpatient Medications:   •  B-D 3CC LUER-SRIDHAR SYR 08KW4-2/2 22G X 1-1/2\" 3 ML misc, USE AS DIRECTED, Disp: , Rfl: 1  •  cyclobenzaprine (FLEXERIL) 10 MG tablet, Take 1 tablet by mouth 3 (Three) Times a Day As Needed for Muscle Spasms., Disp: 60 tablet, Rfl: 0  •  levothyroxine (SYNTHROID, LEVOTHROID) 50 MCG tablet, TAKE 1 TABLET BY MOUTH DAILY, Disp: , Rfl: 11  •  lisinopril (PRINIVIL,ZESTRIL) 10 MG tablet, TAKE 1 TABLET EVERY DAY, Disp: , Rfl: 5  •  oxyCODONE-acetaminophen (PERCOCET)  MG per tablet, Take 1 tablet by mouth Every 4 (Four) Hours As Needed., Disp: , Rfl:   •  PARoxetine " (PAXIL) 20 MG tablet, Take 20 mg by mouth Every Morning., Disp: , Rfl:   •  rivaroxaban (XARELTO) 20 MG tablet, Take 20 mg by mouth Daily., Disp: , Rfl:   •  Testosterone Cypionate (DEPOTESTOTERONE CYPIONATE) 200 MG/ML injection, Inject 200 mg into the appropriate muscle as directed by prescriber Every 14 (Fourteen) Days., Disp: , Rfl:   •  topiramate (TOPAMAX) 50 MG tablet, Take 50 mg by mouth Daily., Disp: , Rfl:     Objective     Vital Signs  Temp:  [98.4 °F (36.9 °C)] 98.4 °F (36.9 °C)  Heart Rate:  [81] 81  BP: (148)/(94) 148/94  Body mass index is 47.71 kg/m².      11/03/22  1023   Weight: (!) 164 kg (361 lb 9.6 oz)       Physical Exam       Results Review:   I reviewed the patient's new clinical results.  I reviewed the patient's new imaging results and agree with the interpretation.      Class 3 Severe Obesity (BMI >=40). Obesity-related health conditions include the following: hypertension and GERD. Obesity is worsening. BMI is is above average; BMI management plan is completed. We discussed portion control and increasing exercise.      Assessment & Plan   Diagnoses and all orders for this visit:    1. Class 3 severe obesity due to excess calories with serious comorbidity and body mass index (BMI) of 45.0 to 49.9 in adult (HCC) (Primary)  Assessment & Plan:  Patient's (Body mass index is 47.71 kg/m².) indicates that they are morbidly obese (BMI > 40 or > 35 with obesity - related health condition) with health conditions that include hypertension and GERD . Weight is worsening. BMI is is above average; BMI management plan is completed. We discussed portion control and increasing exercise.       2. Essential hypertension  Comments:  Continue lisinopril, nutritonal counseling provided.     3. Gastroesophageal reflux disease, unspecified whether esophagitis present  Comments:  Nutritonal counseling provided, EGD will be ordered at later date if patient decides to proceed with surgery.     4. Factor V Leiden  ([facility])  Comments:  Continue Xarelto as prescribed.    He has been provided a structured dietary regimen based off of his behavior.  I discussed with the patient the etiology of the disease of obesity and the potential comorbid conditions associated with this disease.  He was instructed to follow the dietary regimen and follow-up with our program in 1 month's time with any additional questions as they may arise during this time.  We emphasized on focusing on proteins and meals high in fiber as well as adequate hydration that exceed 64 ounces of water daily.    I explained that I anticipate the patient to lose weight prior to his next monthly visit.  I encouraged patient to have a reward day once a month.  1. Today the patient  received the 4 meals/day diet prescription, which was explained to patient.  Patient will see the dietitian today to further discuss goals for diet, exercise, and lifestyle. Patient has received intensive behavioral therapy for obesity today. I explained the pathophysiology of the disease and its storage component. We also discussed Dr. Linton' pearls of the program. Nutrition counseling ordered today.     2. Current comorbid condition of  GERD and hypertension associated with his morbid obesity is reported to be stable on his current treatment regimen and medications. We anticipate the comorbid condition to improve as we address his morbid obesity.       I discussed the patient's findings and my recommendations with patient.     I have also recommended that he obtain a cardiac risk assessment and psychiatric evaluation prior to surgery consideration.  At this time patient is unsure if he wants to proceed with surgical intervention.  Information provided to patient today.  We will discuss course of treatment at next appointment.  Patient has been advised to begin focusing on prescription meal plan.  Patient will meet with dietitian today.      WIL García     11/03/22  13:52 CDT

## 2022-12-19 ENCOUNTER — OFFICE VISIT (OUTPATIENT)
Dept: BARIATRICS/WEIGHT MGMT | Facility: CLINIC | Age: 47
End: 2022-12-19

## 2022-12-19 VITALS
BODY MASS INDEX: 39.17 KG/M2 | HEIGHT: 75 IN | DIASTOLIC BLOOD PRESSURE: 110 MMHG | TEMPERATURE: 98.7 F | WEIGHT: 315 LBS | SYSTOLIC BLOOD PRESSURE: 160 MMHG | OXYGEN SATURATION: 97 % | HEART RATE: 80 BPM

## 2022-12-19 DIAGNOSIS — I10 ESSENTIAL HYPERTENSION: ICD-10-CM

## 2022-12-19 DIAGNOSIS — E66.01 CLASS 3 SEVERE OBESITY DUE TO EXCESS CALORIES WITH SERIOUS COMORBIDITY AND BODY MASS INDEX (BMI) OF 45.0 TO 49.9 IN ADULT: Primary | ICD-10-CM

## 2022-12-19 DIAGNOSIS — R06.83 SNORING: ICD-10-CM

## 2022-12-19 DIAGNOSIS — G47.10 HYPERSOMNIA: ICD-10-CM

## 2022-12-19 PROCEDURE — 99214 OFFICE O/P EST MOD 30 MIN: CPT | Performed by: NURSE PRACTITIONER

## 2022-12-19 RX ORDER — MELOXICAM 15 MG/1
TABLET ORAL
COMMUNITY
Start: 2022-11-30

## 2022-12-19 RX ORDER — ARIPIPRAZOLE 5 MG/1
TABLET ORAL
COMMUNITY
Start: 2022-11-29

## 2022-12-19 RX ORDER — LISINOPRIL 20 MG/1
TABLET ORAL
COMMUNITY
Start: 2022-12-07

## 2022-12-19 RX ORDER — TAMSULOSIN HYDROCHLORIDE 0.4 MG/1
CAPSULE ORAL
COMMUNITY
Start: 2022-12-15

## 2022-12-19 NOTE — PROGRESS NOTES
"Metabolic and Bariatric Surgery Adult Nutrition Assessment    Patient Name: Alin Richard   YOB: 1975   MRN: [id number]     Assessment Date:  12/19/2022     Reason for Visit: Follow-up Nutrition Assessment     Treatment Pathway: Preoperative Bariatric Surgery, Visit 2    Assessment    Anthropometrics   Wt Readings from Last 1 Encounters:   12/19/22 (!) 161 kg (354 lb)     Ht Readings from Last 1 Encounters:   12/19/22 190.5 cm (75\")     BMI Readings from Last 1 Encounters:   12/19/22 44.25 kg/m²        Initial Weight/Date: 361.6 lbs (Nov 2022)  Weight Changes since last visit: -7.6 lbs  Net Weight Change: -7.6 lbs    Past Medical History:   Diagnosis Date   • Arthritis    • Depression    • Disease of thyroid gland    • Factor V Leiden (HCC)    • GERD (gastroesophageal reflux disease)    • Hypertension    • Infarction of kidney (HCC)     BLOOD CLOT IN KIDNEY   • Injury of back    • Migraine    • Murmur, heart    • PTSD (post-traumatic stress disorder)       Past Surgical History:   Procedure Laterality Date   • ANTERIOR LUMBAR EXPOSURE N/A 06/16/2021    Procedure: ANTERIOR LUMBAR EXPOSURE;  Surgeon: Kristopher Ames DO;  Location:  [facility];  Service: Vascular;  Laterality: N/A;   • BACK SURGERY     • LUMBAR DISCECTOMY Bilateral 12/01/2017    Procedure: LUMBAR Micro DISCECTOMY LEFT L4-5 and RIGHT L5-S1;  Surgeon: Favio Castillo MD;  Location:  [facility];  Service:    • LUMBAR FUSION N/A 06/16/2021    Procedure: ANTERIOR DECOMPRESSION, ANTERIOR LUMBAR INTERBODY FUSION WITH INSTRUMENTATION L5-S1;  Surgeon: JULIUS Mirza MD;  Location:  [facility];  Service: Orthopedic Spine;  Laterality: N/A;   • LUMBAR LAMINECTOMY WITH FUSION N/A 06/18/2021    Procedure: POSTERIOR SPINAL FUSION WITH INSTRUMENTATION L5-S1;  Surgeon: JULIUS Mirza MD;  Location:  [facility];  Service: Orthopedic Spine;  Laterality: N/A;   • TONSILLECTOMY     • VASECTOMY        Current Outpatient Medications   Medication Sig " "Dispense Refill   • ARIPiprazole (ABILIFY) 5 MG tablet      • B-D 3CC LUER-SRIDHAR SYR 67GM2-7/2 22G X 1-1/2\" 3 ML misc USE AS DIRECTED  1   • cyclobenzaprine (FLEXERIL) 10 MG tablet Take 1 tablet by mouth 3 (Three) Times a Day As Needed for Muscle Spasms. 60 tablet 0   • levothyroxine (SYNTHROID, LEVOTHROID) 50 MCG tablet TAKE 1 TABLET BY MOUTH DAILY  11   • lisinopril (PRINIVIL,ZESTRIL) 20 MG tablet      • meloxicam (MOBIC) 15 MG tablet      • oxyCODONE-acetaminophen (PERCOCET)  MG per tablet Take 1 tablet by mouth Every 4 (Four) Hours As Needed.     • PARoxetine (PAXIL) 20 MG tablet Take 20 mg by mouth Every Morning.     • rivaroxaban (XARELTO) 20 MG tablet Take 20 mg by mouth Daily.     • tamsulosin (FLOMAX) 0.4 MG capsule 24 hr capsule      • Testosterone Cypionate (DEPOTESTOTERONE CYPIONATE) 200 MG/ML injection Inject 200 mg into the appropriate muscle as directed by prescriber Every 14 (Fourteen) Days.     • topiramate (TOPAMAX) 50 MG tablet Take 50 mg by mouth Daily.       No current facility-administered medications for this visit.      Allergies   Allergen Reactions   • Eggs Or Egg-Derived Products Nausea And Vomiting          Motivation for weight loss includes: Reduce back pain; be able to be outdoors more; improve energy      Nutrition Recall  Eating 4 meals daily   (M1) KIND bar.   (M2) BBQ with slaw. ~8 oz BBQ estimated, but does not measure.   (M3) raw vegetables when gets home.   (M4) Hamburger kaitlin with vegetables (if hungry).   Snacking - limited to none.   Monitoring portions- Is eyeballing portion sizes.   Calculating Protein- is consistently keeping food journal.   Drinking sugary/carbonated beverages- at second meal having a diet Mt dew. Is drinking Diet CranCherry juice at night.   Fluid Intake- estimates ~ 32+ ounces water       Success this Month: Reduced soda intake, reduced added sugars in diet.   Barriers: Not measuring; not feeling hungry for meal 3. Dislikes cooked vegetables, " primarily only eats raw.     Exercise: reports treadmill and wt lifting, daily for ~1hr    Nutrition Intervention  Nutrition education and nutrition coaching for behavior change provided.  Strategies used included Motivational Interviewing , Problem Solving, Skill Development for meal planning and Ongoing reinforcement. Specifically discussed smoothie and requirements for protein supplement options at meals 1 and 3 with blended vegetables  Review of medical weight loss prescription 4 meal/day plan and reviewed nutritional needs for Preoperative Bariatric Surgery, Visit 2.  Self-monitoring strategies such as keeping a food journal (on paper or electronically) and calculating fluid/protein intake were discussed.    Recommended Diet Changes  Eat 4 meals per day with protein and vegetables at each meal, no carbs after meal 2., Protein goal: 65 gms., Eat vegetables first at each meal., Discussed protein guidelines for shakes and bars., Reduce snacking -use foods from free foods list only., Reduce fat, sugar, and/or salt in food choices., Choose more nutrient dense foods., Choose foods with increased fiber., Monitor portion sizes using a food scale and/or measuring cup., Eliminate soda and sugar-sweetened beverages and Increase fluid intake to 64 ounces per day      Goals  1. Measure portions consistently at all meals.  2. Have vegetables servings with all meals.   3. Increasing water intake.     Monitoring/Evaluation Plan  Anticipate follow up per program protocol. Continue collaboration of care with physician and treatment team.     Electronically signed by  Rosie Greenwood RDN, LD  12/19/2022 10:29 CST.

## 2022-12-19 NOTE — PROGRESS NOTES
"Patient Care Team:  Lev Stallworth MD as PCP - General  Lev Stallworth MD as PCP - Family Medicine    Reason for Visit:  Surgical Vs. Medical Weight loss, V2     Subjective   Alin Richard is a 47 y.o. male.     Alin is here for follow-up and continued medical management of his morbid obesity.  He is currently on a prescription diet.  Alin previously was to apply dietary changes such as following the meal plan as directed.  He admits to eating 3-4 meals per day.  As a result he lost weight since his last visit.    He admits to snoring at night.He admits to waking up often throughout the night. He states he has been told he has apneic episodes. He admits moderate daytime fatigue     Review Of Systems:  Review of Systems   Constitutional: Negative.    Respiratory: Negative.    Cardiovascular: Negative.    Gastrointestinal: Negative.    Endocrine: Negative.    Musculoskeletal: Negative.    Psychiatric/Behavioral: Negative.          The following portions of the patient's history were reviewed and updated as appropriate: allergies, current medications, past family history, past medical history, past social history, past surgical history, and problem list.    Objective   BP (!) 160/110 (BP Location: Right arm, Patient Position: Sitting, Cuff Size: Thigh Adult)   Pulse 80   Temp 98.7 °F (37.1 °C)   Ht 190.5 cm (75\")   Wt (!) 161 kg (354 lb)   SpO2 97%   BMI 44.25 kg/m²       12/19/22  0950   Weight: (!) 161 kg (354 lb)       Physical Exam  Vitals reviewed.   Constitutional:       Appearance: He is obese.   Cardiovascular:      Rate and Rhythm: Normal rate and regular rhythm.   Pulmonary:      Effort: Pulmonary effort is normal.   Abdominal:      General: Bowel sounds are normal.      Palpations: Abdomen is soft.   Musculoskeletal:         General: Normal range of motion.   Skin:     General: Skin is warm and dry.   Neurological:      Mental Status: He is alert and oriented to person, place, and time. "   Psychiatric:         Mood and Affect: Mood normal.         Behavior: Behavior normal.         Class 3 Severe Obesity (BMI >=40). Obesity-related health conditions include the following: hypertension. Obesity is improving with treatment. BMI is is above average; BMI management plan is completed. We discussed portion control and increasing exercise.     Assessment & Plan   Diagnoses and all orders for this visit:    1. Class 3 severe obesity due to excess calories with serious comorbidity and body mass index (BMI) of 45.0 to 49.9 in adult (HCC) (Primary)  -     Polysomnography 4 or More Parameters; Future  -     ECG 12 Lead; Future  -     Ambulatory Referral to Psychiatry    2. Essential hypertension  Comments:  Advised to call PCP today or go directly to urgent care; patient has taken his BP medication today   Assessment & Plan:  Hypertension is worsening.  Continue current treatment regimen.  Dietary sodium restriction.  Weight loss.  Blood pressure will be reassessed soon, advised to see PCP today       3. Snoring  Comments:  Neck circumference measured.  Home sleep study ordered.  Orders:  -     Polysomnography 4 or More Parameters; Future    4. Hypersomnia  Comments:  [place] scale complete.  Orders:  -     Polysomnography 4 or More Parameters; Future       [place] Sleepiness Scale (ESS)  Rate situations associated with sleepiness:      Sitting and reading      No chance of dozing (0 points)     Slight chance of dozing (1 point)     Moderate chance of dozing (2 points)   3  High chance of dozing (3 points)   Watching television      No chance of dozing (0 points)     Slight chance of dozing (1 point)   2  Moderate chance of dozing (2 points)     High chance of dozing (3 points)   Sitting inactive in a public place      No chance of dozing (0 points)   1  Slight chance of dozing (1 point)     Moderate chance of dozing (2 points)     High chance of dozing (3 points)   Sitting for an hour as a passenger in a car   "    No chance of dozing (0 points)     Slight chance of dozing (1 point)     Moderate chance of dozing (2 points)   3  High chance of dozing (3 points)   Lying down in the afternoon to rest      No chance of dozing (0 points)     Slight chance of dozing (1 point)     Moderate chance of dozing (2 points)   3  High chance of dozing (3 points)   Sitting and talking to another person      No chance of dozing (0 points)   1  Slight chance of dozing (1 point)     Moderate chance of dozing (2 points)     High chance of dozing (3 points)   Sitting quietly after a lunch (no alcohol at lunch)      No chance of dozing (0 points)     Slight chance of dozing (1 point)   2  Moderate chance of dozing (2 points)     High chance of dozing (3 points)   Sitting in a car, stopped for a few minutes due to traffic      No chance of dozing (0 points)   1  Slight chance of dozing (1 point)     Moderate chance of dozing (2 points)     High chance of dozing (3 points)         Total Criteria Point Count:16     Neck Circ. 19\"    Alin Richard was seen today for follow-up, obesity, nutrition counseling and weight loss.  He has lost weight since his last visit.  Today we discussed healthy changes in lifestyle, diet, and exercise. Dietician consultation obtained.  Alin Richard had received handouts to him explaining the recommendation on portion sizes/appetite control/reading nutrition labels.   Intensive behavioral therapy for obesity was done today as well.     Goals for this month are:   1.  Continue working towards following the prescription meal plan more closely.  Preoperative work-up has been ordered.  2.  Patient has very elevated blood pressure today.  Advised to follow-up with PCP or contact in urgent care.  Nutritional counseling was provided today.  Patient will return in 1 month to see Dr. Linotn to discuss proceeding with a sleeve gastrectomy and have an endoscopy ordered.    Follow up in one month for a weight recheck.  "

## 2022-12-21 NOTE — ASSESSMENT & PLAN NOTE
Hypertension is worsening.  Continue current treatment regimen.  Dietary sodium restriction.  Weight loss.  Blood pressure will be reassessed soon, advised to see PCP today

## 2023-01-17 DIAGNOSIS — E66.01 CLASS 3 SEVERE OBESITY DUE TO EXCESS CALORIES WITH SERIOUS COMORBIDITY AND BODY MASS INDEX (BMI) OF 45.0 TO 49.9 IN ADULT: Primary | ICD-10-CM

## 2023-01-17 DIAGNOSIS — I10 ESSENTIAL HYPERTENSION: ICD-10-CM

## 2023-01-17 DIAGNOSIS — R06.83 SNORING: ICD-10-CM

## 2023-01-17 DIAGNOSIS — G47.10 HYPERSOMNIA: ICD-10-CM

## 2023-02-14 ENCOUNTER — TELEPHONE (OUTPATIENT)
Dept: SLEEP MEDICINE | Facility: HOSPITAL | Age: 48
End: 2023-02-14
Payer: COMMERCIAL

## 2023-02-14 NOTE — TELEPHONE ENCOUNTER
Spoke to Mr. Richard about rescheduling the home sleep test that he was referred for by Ioana DE LA TORRE. Mr. Richard has decided not to proceed with sleep testing at this time. The referral was closed.

## 2023-04-25 ENCOUNTER — NURSE TRIAGE (OUTPATIENT)
Dept: CALL CENTER | Facility: HOSPITAL | Age: 48
End: 2023-04-25
Payer: COMMERCIAL

## 2023-04-25 NOTE — TELEPHONE ENCOUNTER
"Caller requesting refills of his LIsinopril, Metoprolol and Xarelto. Advised to call back in the morning    Reason for Disposition  • [1] Prescription refill request for NON-ESSENTIAL medicine (i.e., no harm to patient if med not taken) AND [2] triager unable to refill per department policy    Additional Information  • Negative: New-onset or worsening symptoms, see that guideline (e.g., diarrhea, runny nose, sore throat)  • Negative: Medicine question not related to refill or renewal  • Negative: Caller (e.g., patient or pharmacist) requesting information about a new medicine  • Negative: Caller requesting information unrelated to medicine  • Negative: [1] Prescription refill request for ESSENTIAL medicine (i.e., likelihood of harm to patient if not taken) AND [2] triager unable to refill per department policy  • Negative: [1] Prescription not at pharmacy AND [2] was prescribed by PCP recently  (Exception: Triager has access to EMR and prescription is recorded there. Go to Home Care and confirm for pharmacy.)  • Negative: [1] Pharmacy calling with prescription questions AND [2] triager unable to answer question  • Negative: Prescription request for new medicine (not a refill)  • Negative: Caller requesting a CONTROLLED substance prescription refill (e.g., narcotics, ADHD medicines)    Answer Assessment - Initial Assessment Questions  1. DRUG NAME: \"What medicine do you need to have refilled?\"      Lisinopril 20 mg , Metoprolol, and Xarelto 20 mg  2. REFILLS REMAINING: \"How many refills are remaining?\" (Note: The label on the medicine or pill bottle will show how many refills are remaining. If there are no refills remaining, then a renewal may be needed.)      No refills  3. EXPIRATION DATE: \"What is the expiration date?\" (Note: The label states when the prescription will , and thus can no longer be refilled.)      *No Answer*  4. PRESCRIBING HCP: \"Who prescribed it?\" Reason: If prescribed by specialist, call " "should be referred to that group.      Latrell Huitron APRN  5. SYMPTOMS: \"Do you have any symptoms?\"     no  6. PREGNANCY: \"Is there any chance that you are pregnant?\" \"When was your last menstrual period?\"      na    Protocols used: MEDICATION REFILL AND RENEWAL CALL-ADULT-      "

## 2023-09-29 ENCOUNTER — NURSE TRIAGE (OUTPATIENT)
Dept: CALL CENTER | Facility: HOSPITAL | Age: 48
End: 2023-09-29
Payer: COMMERCIAL

## 2023-09-29 NOTE — TELEPHONE ENCOUNTER
"Reason for Disposition   [1] Pharmacy calling with prescription questions AND [2] triager unable to answer question    Additional Information   Negative: New-onset or worsening symptoms, see that guideline (e.g., diarrhea, runny nose, sore throat)   Negative: Medicine question not related to refill or renewal   Negative: Caller (e.g., patient or pharmacist) requesting information about a new medicine   Negative: Caller requesting information unrelated to medicine   Negative: [1] Prescription refill request for ESSENTIAL medicine (i.e., likelihood of harm to patient if not taken) AND [2] triager unable to refill per department policy   Negative: [1] Prescription not at pharmacy AND [2] was prescribed by PCP recently  (Exception: Triager has access to EMR and prescription is recorded there. Go to Home Care and confirm for pharmacy.)    Answer Assessment - Initial Assessment Questions  1. DRUG NAME: \"What medicine do you need to have refilled?\"      Xarelto  2. REFILLS REMAINING: \"How many refills are remaining?\" (Note: The label on the medicine or pill bottle will show how many refills are remaining. If there are no refills remaining, then a renewal may be needed.)      Patient does not remember the strength  3. EXPIRATION DATE: \"What is the expiration date?\" (Note: The label states when the prescription will , and thus can no longer be refilled.)      NA  4. PRESCRIBING HCP: \"Who prescribed it?\" Reason: If prescribed by specialist, call should be referred to that group.      na  5. SYMPTOMS: \"Do you have any symptoms?\"      na  6. PREGNANCY: \"Is there any chance that you are pregnant?\" \"When was your last menstrual period?\"      na    Protocols used: Medication Refill and Renewal Call-ADULT-    "

## 2023-09-29 NOTE — TELEPHONE ENCOUNTER
Medication question 09/29/2023 [facility] pharmacy calling for refill order on patient's Xarelto. Patient does not remember strength.   Mayuri at [facility]'s pharmacy calling to request refill of Xarelto, patient does not remember the strength and has not gotten it from them before.    Ear Wedge Repair Text: A wedge excision was completed by carrying down an excision through the full thickness of the ear and cartilage with an inward facing Burow's triangle. The wound was then closed in a layered fashion.

## 2023-12-27 ENCOUNTER — NURSE TRIAGE (OUTPATIENT)
Dept: CALL CENTER | Facility: HOSPITAL | Age: 48
End: 2023-12-27
Payer: COMMERCIAL

## 2023-12-27 NOTE — TELEPHONE ENCOUNTER
"Reason for Disposition  • Requesting regular office appointment    Additional Information  • Negative: [1] Caller is not with the adult (patient) AND [2] reporting urgent symptoms  • Negative: Lab result questions  • Negative: Medication questions  • Negative: Caller can't be reached by phone  • Negative: Caller has already spoken to PCP or another triager  • Negative: RN needs further essential information from caller in order to complete triage    Answer Assessment - Initial Assessment Questions  1. REASON FOR CALL or QUESTION: \"What is your reason for calling today?\" or \"How can I best help you?\" or \"What question do you have that I can help answer?\"      Patient is out of town and needs to cancel appointment with Dr. Stallworth on 12/28    Protocols used: Information Only Call-ADULT-    "

## 2024-01-13 NOTE — DISCHARGE SUMMARY
[facility] Medicine Services  DISCHARGE SUMMARY       Date of Admission: 10/3/2017  Date of Discharge:  10/5/2017  Primary Care Physician: Lev Stallworth MD    Discharge Diagnoses:  Patient Active Problem List   Diagnosis   • Displacement of lumbar intervertebral disc without myelopathy   • BMI 38.0-38.9,adult   • Non-smoker   • Renal infarction   • Hypertension   • Herniated intervertebral disc of lumbar spine   • Disease of thyroid gland   • PFO (patent foramen ovale)         Presenting Problem/History of Present Illness:  Renal infarction [N28.0]     Chief Complaint on Day of Discharge:   The patient's pain significantly reduced and is a 1 out of 10 today.    History of Present Illness on Day of Discharge:   Much less pain assessed today.  The patient is anxious for discharge home.  Has been no evidence of inappropriate bleeding with anticoagulation.    Hospital Course  Patient is a 42 y.o. male presented with left flank and abdominal discomfort.  The patient was evaluated in the emergency department and CT scan of the abdomen and pelvis was performed indicating a left upper pole renal infarct.  The patient was admitted for subsequent workup in the evaluation for determination of the origin of the possible embolus.  Echocardiogram was performed revealing a patent foramen ovale not felt to be high risk for such an embolus.  However, the patient's mother has a history of hypercoagulability and atrial fibrillation and has been anticoagulated for many years.  Hypercoagulability workup was initiated however results will not return for several days.  Venous Doppler study of the bilateral lower extremities showed no evidence of thrombus.  The patient's pain de-escalated to a 1 out of 10.  Cardiology was consulted on admission for evaluation and felt the patient should be anticoagulated for 6 months with reassessment at 3 months.  There are no pressing or current indications for  immediate PFO closure and that will be reconsidered in 3 months.  The patient will be discharged with a prescription for Eliquis and is to follow up with his PCP in 2 weeks.      Consults:   Cardiology    Pertinent Test Results:   Imaging Results (last 7 days)     Procedure Component Value Units Date/Time    CT Abdomen Pelvis With Contrast [[id number]] Collected:  10/03/17 1541     Updated:  10/03/17 1554    Narrative:       EXAMINATION: CT ABDOMEN PELVIS W CONTRAST-      10/3/2017 1:59 PM CST     HISTORY: Left lower quadrant abdominal pain and left flank pain. Pain  radiates from back to front.     In order to have a CT radiation dose as low as reasonably achievable  Automated Exposure Control was utilized for adjustment of the mA and/or  KV according to patient size.     DLP in mGycm= 1150.     Discrete 4 x 2 cm nonenhancing parenchymal focus at the upper pole of  the left kidney compatible with focal renal infarction. There is no  atherosclerotic disease seen involving the upper aorta or renal arteries  and evaluation for embolic disease is recommended.     The only comparison study is a noncontrast CT from 05/24/17. An infarct  likely would not be seen on a noncontrast study. No renal abnormality  could be seen at that time.     Given the patient's left-sided symptoms this finding must be considered  to be acute.     Normal heart size.  Clear lung bases.  A few small hepatic cysts are again seen.  Otherwise normal liver.  Normal gallbladder, pancreas, spleen, adrenal glands, and right.     No bowel dilation.  No free fluid.  No appendicitis or diverticulitis.     No pelvic mass or fluid.     Summary:  1. Focal infarction of the upper pole of the left kidney. There is no  evidence of atherosclerotic vascular disease and evaluation for embolic  disease is recommended.                                   This report was finalized on 10/03/2017 15:51 by Dr. Edgar Gómez MD.    US Venous Doppler Lower Extremity  Bilateral (duplex)     No evidence of thrombus Updated:  10/04/17 1631         Specimen:  Blood Updated:  10/03/17 1447     WBC 9.72 10*3/mm3      RBC 4.71 (L) 10*6/mm3      Hemoglobin 13.9 (L) g/dL      Hematocrit 41.6 %      MCV 88.3 fL      MCH 29.5 pg      MCHC 33.4 g/dL      RDW 14.1 %      RDW-SD 45.1 fl      MPV 9.8 fL      Platelets 253 10*3/mm3      Neutrophil % 75.0 %      Lymphocyte % 17.4 %      Monocyte % 6.8 %      Eosinophil % 0.5 %      Basophil % 0.3 %      Neutrophils, Absolute 7.29 10*3/mm3      Lymphocytes, Absolute 1.69 10*3/mm3      Monocytes, Absolute 0.66 10*3/mm3      Eosinophils, Absolute 0.05 10*3/mm3      Basophils, Absolute 0.03 10*3/mm3     Lactic Acid, Plasma [[id number]]  (Normal) Collected:  10/03/17 1418    Specimen:  Blood Updated:  10/03/17 1451     Lactate 1.1 mmol/L     C-reactive Protein [[id number]]  (Normal) Collected:  10/03/17 1418    Specimen:  Blood Updated:  10/03/17 1454     C-Reactive Protein 0.74 mg/dL     Comprehensive Metabolic Panel [[id number]]  (Abnormal) Collected:  10/03/17 1418    Specimen:  Blood Updated:  10/03/17 1454     Glucose 99 mg/dL      BUN 16 mg/dL      Creatinine 0.90 mg/dL      Sodium 142 mmol/L      Potassium 3.9 mmol/L      Chloride 107 mmol/L      CO2 21.0 (L) mmol/L      Calcium 9.3 mg/dL      Total Protein 7.6 g/dL      Albumin 4.50 g/dL      ALT (SGPT) 51 U/L      AST (SGOT) 31 U/L      Alkaline Phosphatase 70 U/L      Total Bilirubin 0.7 mg/dL      eGFR Non African Amer 92 mL/min/1.73      Globulin 3.1 gm/dL      A/G Ratio 1.5 g/dL      BUN/Creatinine Ratio 17.8     Anion Gap 14.0 (H) mmol/L     Lipase [[id number]]  (Abnormal) Collected:  10/03/17 1418    Specimen:  Blood Updated:  10/03/17 1454     Lipase 18 (L) U/L     Amylase [[id number]]  (Normal) Collected:  10/03/17 1418    Specimen:  Blood Updated:  10/03/17 1454     Amylase 35 U/L     Urinalysis With / Culture If Indicated - Urine, Clean Catch [[id number]]  (Abnormal) Collected:  10/03/17  1516    Specimen:  Urine from Urine, Clean Catch Updated:  10/03/17 1527     Color, UA Yellow     Appearance, UA Clear     pH, UA <=5.0     Specific Gravity, UA 1.009     Glucose, UA Negative     Ketones, UA Trace (A)     Bilirubin, UA Negative     Blood, UA Negative     Protein, UA Negative     Leuk Esterase, UA Negative     Nitrite, UA Negative     Urobilinogen, UA 0.2 E.U./dL    Narrative:       Urine microscopic not indicated.    Protime-INR [[id number]]  (Normal) Collected:  10/03/17 1617    Specimen:  Blood Updated:  10/03/17 1641     Protime 13.8 Seconds      INR 1.03    aPTT [[id number]]  (Normal) Collected:  10/03/17 1617    Specimen:  Blood Updated:  10/03/17 1641     PTT 24.1 seconds     Factor 5 Leiden [[id number]] Collected:  10/03/17 1842    Specimen:  Blood Updated:  10/03/17 1858    Factor II, DNA Analysis [[id number]] Collected:  10/03/17 1842    Specimen:  Blood Updated:  10/03/17 1858    Protein S Functional [[id number]] Collected:  10/03/17 1842    Specimen:  Blood Updated:  10/03/17 1859    Protein C Activity [[id number]] Collected:  10/03/17 1843    Specimen:  Blood Updated:  10/03/17 1859    Protein C Antigen, Total [[id number]] Collected:  10/03/17 1842    Specimen:  Blood Updated:  10/03/17 1859    Lupus Anticoagulant [[id number]] Collected:  10/03/17 1843    Specimen:  Blood Updated:  10/03/17 1859    Factor 5 Activity [[id number]] Collected:  10/03/17 1843    Specimen:  Blood Updated:  10/03/17 1859    Protein S Antigen, Total [[id number]] Collected:  10/03/17 1842    Specimen:  Blood Updated:  10/03/17 1859    Antiphosphotidyl Antibodies Panel II [[id number]] Collected:  10/03/17 1842    Specimen:  Blood Updated:  10/03/17 1859    Anticardiolipin Antibody, IgG / M, Qn [[id number]] Collected:  10/03/17 1842    Specimen:  Blood Updated:  10/03/17 1900    CBC Auto Differential [[id number]]  (Normal) Collected:  10/03/17 1842    Specimen:  Blood Updated:  10/03/17 1918     WBC 10.70 10*3/mm3      RBC 4.87  10*6/mm3      Hemoglobin 14.4 g/dL      Hematocrit 43.4 %      MCV 89.1 fL      MCH 29.6 pg      MCHC 33.2 g/dL      RDW 14.1 %      RDW-SD 45.4 fl      MPV 10.1 fL      Platelets 275 10*3/mm3      Neutrophil % 70.7 %      Lymphocyte % 21.8 %      Monocyte % 6.4 %      Eosinophil % 0.7 %      Basophil % 0.4 %      Neutrophils, Absolute 7.58 10*3/mm3      Lymphocytes, Absolute 2.33 10*3/mm3      Monocytes, Absolute 0.68 10*3/mm3      Eosinophils, Absolute 0.07 10*3/mm3      Basophils, Absolute 0.04 10*3/mm3     Protime-INR [[id number]]  (Normal) Collected:  10/03/17 1842    Specimen:  Blood Updated:  10/03/17 1927     Protime 12.9 Seconds      INR 0.94    aPTT [[id number]]  (Normal) Collected:  10/03/17 1842    Specimen:  Blood Updated:  10/03/17 1927     PTT 24.9 seconds     Antithrombin III [[id number]]  (Normal) Collected:  10/03/17 1842    Specimen:  Blood Updated:  10/03/17 1927     Antithrombin Activity 107 %     Basic Metabolic Panel [[id number]]  (Abnormal) Collected:  10/03/17 1842    Specimen:  Blood Updated:  10/03/17 1930     Glucose 89 mg/dL      BUN 13 mg/dL      Creatinine 0.88 mg/dL      Sodium 143 mmol/L      Potassium 3.8 mmol/L      Chloride 105 mmol/L      CO2 24.0 mmol/L      Calcium 9.3 mg/dL      eGFR Non African Amer 94 mL/min/1.73      BUN/Creatinine Ratio 14.8     Anion Gap 14.0 (H) mmol/L     CBC Auto Differential [[id number]]  (Abnormal) Collected:  10/04/17 0530    Specimen:  Blood Updated:  10/04/17 0610     WBC 9.13 10*3/mm3      RBC 4.35 (L) 10*6/mm3      Hemoglobin 13.0 (L) g/dL      Hematocrit 39.9 (L) %      MCV 91.7 fL      MCH 29.9 pg      MCHC 32.6 (L) g/dL      RDW 14.1 %      RDW-SD 47.4 fl      MPV 10.0 fL      Platelets 230 10*3/mm3      Neutrophil % 58.6 %      Lymphocyte % 30.6 %      Monocyte % 9.0 %      Eosinophil % 1.3 %      Basophil % 0.3 %      Immature Grans % 0.2 %      Neutrophils, Absolute 5.35 10*3/mm3      Lymphocytes, Absolute 2.79 10*3/mm3      Monocytes, Absolute  Low Risk (score 7-11) 0.82 10*3/mm3      Eosinophils, Absolute 0.12 10*3/mm3      Basophils, Absolute 0.03 10*3/mm3      Immature Grans, Absolute 0.02 10*3/mm3     Basic Metabolic Panel [[id number]]  (Abnormal) Collected:  10/04/17 0530    Specimen:  Blood Updated:  10/04/17 0629     Glucose 107 (H) mg/dL      BUN 11 mg/dL      Creatinine 0.98 mg/dL      Sodium 143 mmol/L      Potassium 4.1 mmol/L      Chloride 107 mmol/L      CO2 24.0 mmol/L      Calcium 8.9 mg/dL      eGFR Non African Amer 83 mL/min/1.73      BUN/Creatinine Ratio 11.2     Anion Gap 12.0 mmol/L     Narrative:       GFR Normal >60  Chronic Kidney Disease <60  Kidney Failure <15    Homocysteine [[id number]] Collected:  10/03/17 1842    Specimen:  Blood Updated:  10/05/17 0615     Homocystine, Plasma (Quant) 9.1 umol/L     CBC Auto Differential [[id number]]  (Abnormal) Collected:  10/05/17 0543    Specimen:  Blood Updated:  10/05/17 0633     WBC 11.01 (H) 10*3/mm3      RBC 4.16 (L) 10*6/mm3      Hemoglobin 12.5 (L) g/dL      Hematocrit 38.6 (L) %      MCV 92.8 fL      MCH 30.0 pg      MCHC 32.4 (L) g/dL      RDW 14.2 %      RDW-SD 48.4 fl      MPV 10.0 fL      Platelets 198 10*3/mm3      Neutrophil % 63.1 %      Lymphocyte % 23.2 %      Monocyte % 11.9 %      Eosinophil % 1.1 %      Basophil % 0.4 %      Immature Grans % 0.3 %      Neutrophils, Absolute 6.96 10*3/mm3      Lymphocytes, Absolute 2.55 10*3/mm3      Monocytes, Absolute 1.31 (H) 10*3/mm3      Eosinophils, Absolute 0.12 10*3/mm3      Basophils, Absolute 0.04 10*3/mm3      Immature Grans, Absolute 0.03 10*3/mm3     Basic Metabolic Panel [[id number]]  (Normal) Collected:  10/05/17 0543    Specimen:  Blood Updated:  10/05/17 0639     Glucose 97 mg/dL      BUN 10 mg/dL      Creatinine 1.18 mg/dL      Sodium 141 mmol/L      Potassium 4.1 mmol/L      Chloride 104 mmol/L      CO2 28.0 mmol/L      Calcium 8.8 mg/dL      eGFR Non African Amer 67 mL/min/1.73      BUN/Creatinine Ratio 8.5     Anion Gap 9.0 mmol/L   "    Condition on Discharge:    Stable    Physical Exam on Discharge:  /55  Pulse 71  Temp 98.5 °F (36.9 °C)  Resp 20  Ht 74\" (188 cm)  Wt (!) 300 lb 3.2 oz (136 kg)  SpO2 97%  BMI 38.54 kg/m2  Physical Exam  Constitutional: He is oriented to person, place, and time. He appears well-developed and well-nourished. He is cooperative.   HENT:   Head: Normocephalic and atraumatic.   Right Ear: External ear normal.   Left Ear: External ear normal.   Nose: Nose normal.   Mouth/Throat: Oropharynx is clear and moist.   Eyes: Conjunctivae and EOM are normal. Pupils are equal, round, and reactive to light. No scleral icterus.   Neck: No JVD present. No tracheal deviation present. No thyromegaly present.   Cardiovascular: Normal rate, regular rhythm, normal heart sounds and intact distal pulses.    No murmur heard.  Pulmonary/Chest: Effort normal and breath sounds normal. No respiratory distress. He has no wheezes. He has no rales.   Abdominal: Bowel sounds are normal. He exhibits no distension and no mass. There is no tenderness.   Musculoskeletal: Normal range of motion. He exhibits no edema or tenderness.   Neurological: He is alert and oriented to person, place, and time. He has normal reflexes. No cranial nerve deficit. Coordination normal.   Skin: Skin is warm and dry. No rash noted.     Discharge Disposition:  Home or Self Care    Discharge Medications:   Alin Richard   Home Medication Instructions ROMAN:[id number]    Printed on:10/05/17 1012   Medication Information                      apixaban (ELIQUIS) 5 MG tablet tablet  Take 1 tablet by mouth 2 (Two) Times a Day.             diazePAM (VALIUM) 5 MG tablet  Take 1 tablet by mouth Every 6 (Six) Hours As Needed for Anxiety.             Escitalopram Oxalate (LEXAPRO PO)  Take 20 mg by mouth Daily.             Hydrocodone-Acetaminophen (NORCO PO)  Take  by mouth.             HYDROcodone-acetaminophen (NORCO)  MG per tablet  Take 1 tablet by mouth " Every 6 (Six) Hours As Needed for Moderate Pain (4-6).             Levothyroxine Sodium (SYNTHROID PO)  Take 50 mcg by mouth Daily.             LISINOPRIL PO  Take 10 mg by mouth Daily.             MethylPREDNISolone (MEDROL, ERASTO,) 4 MG tablet  Take as directed on package instructions.             oxyCODONE-acetaminophen (PERCOCET) 7.5-325 MG per tablet  Take 1 tablet by mouth Every 4 (Four) Hours As Needed for Severe Pain  for up to 8 days.             TESTOSTERONE CYPIONATE IM  Inject 100 mg into the shoulder, thigh, or buttocks 1 (One) Time Per Week.             tiZANidine (ZANAFLEX) 4 MG tablet  Take 1 tablet by mouth Every 8 (Eight) Hours As Needed for Muscle Spasms.             Topiramate (TOPAMAX PO)  Take 50 mg by mouth Daily.                 Discharge Diet:   Diet Instructions     Diet: Regular       Discharge Diet:  Regular                 Discharge Care Plan / Instructions:   Discharge home    Activity at Discharge:   Activity Instructions     Activity as Tolerated                     Follow-up Appointments:  Follow-up with primary care physician in 2 weeks  Follow-up with cardiology in 3 months    Test Results Pending at Discharge:   Hypercoagulability workup     Kunal Cross DO  10/05/17  10:12 AM    Time: Discharge Less than 30 min    Please note that portions of this note may have been completed with a voice recognition program. Efforts were made to edit the dictations, but occasionally words are mistranscribed.

## 2024-08-01 ENCOUNTER — TRANSCRIBE ORDERS (OUTPATIENT)
Dept: ADMINISTRATIVE | Facility: HOSPITAL | Age: 49
End: 2024-08-01
Payer: COMMERCIAL

## 2024-08-02 ENCOUNTER — TRANSCRIBE ORDERS (OUTPATIENT)
Dept: ADMINISTRATIVE | Facility: HOSPITAL | Age: 49
End: 2024-08-02
Payer: COMMERCIAL

## 2024-08-02 DIAGNOSIS — R55 SYNCOPE AND COLLAPSE: Primary | ICD-10-CM

## 2024-08-06 ENCOUNTER — TRANSCRIBE ORDERS (OUTPATIENT)
Dept: ADMINISTRATIVE | Facility: HOSPITAL | Age: 49
End: 2024-08-06
Payer: COMMERCIAL

## 2024-08-06 DIAGNOSIS — R55 SYNCOPE AND COLLAPSE: Primary | ICD-10-CM

## 2024-08-29 ENCOUNTER — HOSPITAL ENCOUNTER (OUTPATIENT)
Dept: CARDIOLOGY | Facility: HOSPITAL | Age: 49
Discharge: HOME | End: 2024-08-29
Payer: MEDICAID

## 2024-09-03 ENCOUNTER — HOSPITAL ENCOUNTER (OUTPATIENT)
Dept: CARDIOLOGY | Facility: HOSPITAL | Age: 49
Discharge: HOME | End: 2024-09-03
Payer: MEDICAID

## 2025-04-30 ENCOUNTER — TRANSCRIBE ORDERS (OUTPATIENT)
Dept: ADMINISTRATIVE | Facility: HOSPITAL | Age: 50
End: 2025-04-30
Payer: MEDICAID

## 2025-04-30 DIAGNOSIS — M25.562 LEFT KNEE PAIN, UNSPECIFIED CHRONICITY: Primary | ICD-10-CM

## 2025-04-30 DIAGNOSIS — M25.561 RIGHT KNEE PAIN, UNSPECIFIED CHRONICITY: ICD-10-CM

## (undated) DEVICE — SUT SILK 4/0 SUTUPAK TIES 24IN SA73H

## (undated) DEVICE — GLV SURG BIOGEL LTX PF 7 1/2

## (undated) DEVICE — SPNG DISSCT SECTO KTTNER PK/5

## (undated) DEVICE — DRP C/ARMOR

## (undated) DEVICE — APPL CHLORAPREP HI/LITE 26ML ORNG

## (undated) DEVICE — SPONGE,LAP,12"X12",XR,ST,5/PK,40PK/CS: Brand: MEDLINE

## (undated) DEVICE — TOTAL TRAY, 16FR 10ML SIL FOLEY, URN: Brand: MEDLINE

## (undated) DEVICE — PACK,SET UP,NO DRAPES: Brand: MEDLINE

## (undated) DEVICE — ANTIBACTERIAL VIOLET BRAIDED (POLYGLACTIN 910), SYNTHETIC ABSORBABLE SUTURE: Brand: COATED VICRYL

## (undated) DEVICE — SUT PROLN 5/0 C1 DA 24IN 8725H

## (undated) DEVICE — SPK10277 JACKSON/PRO-AXIS KIT: Brand: SPK10277 JACKSON/PRO-AXIS KIT

## (undated) DEVICE — SUT SILK 3/0 SUTUPAK TIES 24IN SA74H

## (undated) DEVICE — 4-PORT MANIFOLD: Brand: NEPTUNE 2

## (undated) DEVICE — ELECTRD BLD EZ CLN STD 6.5IN

## (undated) DEVICE — CLTH CLENS READYCLEANSE PERI CARE PK/5

## (undated) DEVICE — GLV SURG BIOGEL LTX PF 6 1/2

## (undated) DEVICE — ANTIBACTERIAL UNDYED BRAIDED (POLYGLACTIN 910), SYNTHETIC ABSORBABLE SUTURE: Brand: COATED VICRYL

## (undated) DEVICE — SUT MNCRYL 4/0 PS2 27IN UD MCP426H

## (undated) DEVICE — PROBE 8225101 5PK STD PRASS FL TIP ROHS

## (undated) DEVICE — SUT SILK 0 SUTUPAK TIES 24IN SA76G

## (undated) DEVICE — SUT VIC 0 MO4 CR8 18IN VCP701D

## (undated) DEVICE — DISPOSABLE IRRIGATION CASSETTE: Brand: CORE

## (undated) DEVICE — WIPE THERAWASH SLV SPEC CARE 2PK

## (undated) DEVICE — SUT PDS 0 CTX 36IN VIO PDP370T

## (undated) DEVICE — ELECTRD BLD EDGE/INSUL1P 2.4X5.1MM STRL

## (undated) DEVICE — PK SPINE POST 30

## (undated) DEVICE — NDL SPINE 25G 31/2IN BLU

## (undated) DEVICE — GLV SURG TRIUMPH GREEN W/ALOE PF LTX 8 STRL

## (undated) DEVICE — ELECTRD BLD EXT EDGE/INSUL 1P 4IN

## (undated) DEVICE — PREVENA PEEL & PLACE SYSTEM KIT- 13 CM: Brand: PREVENA™ PEEL & PLACE™

## (undated) DEVICE — DECANTER: Brand: UNBRANDED

## (undated) DEVICE — PK TURNOVER RM ADV

## (undated) DEVICE — GLV SURG GRN DERMASSURE LF PF 7.5

## (undated) DEVICE — SPNG GZ WOVN 4X4IN 12PLY 10/BX STRL

## (undated) DEVICE — SCANLAN® SURG-I-PAW® INSTRUMENT COVERS - RED, 1/10" X 5"/ 3 MMX13 CM (2 - 5" PCS /PKG): Brand: SCANLAN® SURG-I-PAW® INSTRUMENT COVERS

## (undated) DEVICE — GLV SURG DERMASSURE GRN LF PF 8.0

## (undated) DEVICE — DRSNG SURESITE WNDW 4X4.5

## (undated) DEVICE — CONN FLX BREATHE CIRCT

## (undated) DEVICE — GOWN,NON-REINFORCED,SIRUS,SET IN SLV,XL: Brand: MEDLINE

## (undated) DEVICE — CATH IV ANGIO FEP 12G 3IN LTBLU 10PK

## (undated) DEVICE — SHEET, T, LAPAROTOMY, STERILE: Brand: MEDLINE

## (undated) DEVICE — CVR UNIV C/ARM

## (undated) DEVICE — 4.0MM PRECISION ROUND

## (undated) DEVICE — DRP SURG UTIL W/TPE 2/LAYR 15X26IN DISP

## (undated) DEVICE — TRY PREP SCRB VAG PVP

## (undated) DEVICE — TROCAR TIP NITINOL GUIDEWIRE 18": Brand: INVICTUS

## (undated) DEVICE — SUT SILK 2/0 SH 75CM 30IN BLK C016D

## (undated) DEVICE — LP VESL MAXI 2.5X1MM RED 2PK

## (undated) DEVICE — CONTAINER,SPECIMEN,OR STERILE,4OZ: Brand: MEDLINE

## (undated) DEVICE — HEMOST ABS GELFOAM GELATIN SPNG SZ100

## (undated) DEVICE — SUT SILK 2/0 SUTUPAK TIES 24IN SA75H

## (undated) DEVICE — ARCUS STIMULATING TARGETING NEEDLE, BEVEL TIP: Brand: ARCUS

## (undated) DEVICE — GLV SURG SENSICARE W/ALOE PF LF 7.5 STRL

## (undated) DEVICE — ELECTRD BLD EZ CLN MOD XLNG 2.75IN

## (undated) DEVICE — GLV SURG BIOGEL LTX PF 8

## (undated) DEVICE — YANKAUER SUCTION INSTRUMENT WITHOUT CONTROL VENT, OPEN TIP, CLEAR: Brand: YANKAUER